# Patient Record
Sex: FEMALE | Race: WHITE | NOT HISPANIC OR LATINO | Employment: OTHER | URBAN - METROPOLITAN AREA
[De-identification: names, ages, dates, MRNs, and addresses within clinical notes are randomized per-mention and may not be internally consistent; named-entity substitution may affect disease eponyms.]

---

## 2017-01-03 ENCOUNTER — LAB (OUTPATIENT)
Dept: LAB | Facility: HOSPITAL | Age: 82
End: 2017-01-03

## 2017-01-03 ENCOUNTER — OFFICE VISIT (OUTPATIENT)
Dept: ONCOLOGY | Facility: CLINIC | Age: 82
End: 2017-01-03

## 2017-01-03 VITALS
OXYGEN SATURATION: 95 % | WEIGHT: 103.4 LBS | SYSTOLIC BLOOD PRESSURE: 122 MMHG | RESPIRATION RATE: 14 BRPM | TEMPERATURE: 97.8 F | BODY MASS INDEX: 20.3 KG/M2 | DIASTOLIC BLOOD PRESSURE: 80 MMHG | HEART RATE: 62 BPM | HEIGHT: 60 IN

## 2017-01-03 DIAGNOSIS — C18.7 MALIGNANT NEOPLASM OF SIGMOID COLON (HCC): Primary | ICD-10-CM

## 2017-01-03 DIAGNOSIS — G50.0 TRIGEMINAL NEURALGIA: ICD-10-CM

## 2017-01-03 DIAGNOSIS — C18.7 MALIGNANT NEOPLASM OF SIGMOID COLON (HCC): ICD-10-CM

## 2017-01-03 LAB
ALBUMIN SERPL-MCNC: 4.3 G/DL (ref 3.5–5.2)
ALBUMIN/GLOB SERPL: 1.9 G/DL (ref 1.1–2.4)
ALP SERPL-CCNC: 78 U/L (ref 38–116)
ALT SERPL W P-5'-P-CCNC: 21 U/L (ref 0–33)
ANION GAP SERPL CALCULATED.3IONS-SCNC: 10.6 MMOL/L
AST SERPL-CCNC: 26 U/L (ref 0–32)
BASOPHILS # BLD AUTO: 0.01 10*3/MM3 (ref 0–0.1)
BASOPHILS NFR BLD AUTO: 0.2 % (ref 0–1.1)
BILIRUB SERPL-MCNC: 0.2 MG/DL (ref 0.1–1.2)
BUN BLD-MCNC: 24 MG/DL (ref 6–20)
BUN/CREAT SERPL: 36.4 (ref 7.3–30)
CALCIUM SPEC-SCNC: 9.4 MG/DL (ref 8.5–10.2)
CEA SERPL-MCNC: 1.85 NG/ML
CHLORIDE SERPL-SCNC: 101 MMOL/L (ref 98–107)
CO2 SERPL-SCNC: 28.4 MMOL/L (ref 22–29)
CREAT BLD-MCNC: 0.66 MG/DL (ref 0.6–1.1)
DEPRECATED RDW RBC AUTO: 43.5 FL (ref 37–49)
EOSINOPHIL # BLD AUTO: 0.04 10*3/MM3 (ref 0–0.36)
EOSINOPHIL NFR BLD AUTO: 0.8 % (ref 1–5)
ERYTHROCYTE [DISTWIDTH] IN BLOOD BY AUTOMATED COUNT: 12.4 % (ref 11.7–14.5)
GFR SERPL CREATININE-BSD FRML MDRD: 85 ML/MIN/1.73
GLOBULIN UR ELPH-MCNC: 2.3 GM/DL (ref 1.8–3.5)
GLUCOSE BLD-MCNC: 83 MG/DL (ref 74–124)
HCT VFR BLD AUTO: 37.4 % (ref 34–45)
HGB BLD-MCNC: 12 G/DL (ref 11.5–14.9)
IMM GRANULOCYTES # BLD: 0.02 10*3/MM3 (ref 0–0.03)
IMM GRANULOCYTES NFR BLD: 0.4 % (ref 0–0.5)
LYMPHOCYTES # BLD AUTO: 1.04 10*3/MM3 (ref 1–3.5)
LYMPHOCYTES NFR BLD AUTO: 20.7 % (ref 20–49)
MCH RBC QN AUTO: 30.8 PG (ref 27–33)
MCHC RBC AUTO-ENTMCNC: 32.1 G/DL (ref 32–35)
MCV RBC AUTO: 96.1 FL (ref 83–97)
MONOCYTES # BLD AUTO: 0.49 10*3/MM3 (ref 0.25–0.8)
MONOCYTES NFR BLD AUTO: 9.7 % (ref 4–12)
NEUTROPHILS # BLD AUTO: 3.43 10*3/MM3 (ref 1.5–7)
NEUTROPHILS NFR BLD AUTO: 68.2 % (ref 39–75)
NRBC BLD MANUAL-RTO: 0 /100 WBC (ref 0–0)
PLATELET # BLD AUTO: 114 10*3/MM3 (ref 150–375)
PMV BLD AUTO: 8.9 FL (ref 8.9–12.1)
POTASSIUM BLD-SCNC: 4.8 MMOL/L (ref 3.5–4.7)
PROT SERPL-MCNC: 6.6 G/DL (ref 6.3–8)
RBC # BLD AUTO: 3.89 10*6/MM3 (ref 3.9–5)
SODIUM BLD-SCNC: 140 MMOL/L (ref 134–145)
WBC NRBC COR # BLD: 5.03 10*3/MM3 (ref 4–10)

## 2017-01-03 PROCEDURE — 80053 COMPREHEN METABOLIC PANEL: CPT

## 2017-01-03 PROCEDURE — 36415 COLL VENOUS BLD VENIPUNCTURE: CPT

## 2017-01-03 PROCEDURE — 85025 COMPLETE CBC W/AUTO DIFF WBC: CPT

## 2017-01-03 PROCEDURE — 82378 CARCINOEMBRYONIC ANTIGEN: CPT | Performed by: INTERNAL MEDICINE

## 2017-01-03 PROCEDURE — 99213 OFFICE O/P EST LOW 20 MIN: CPT | Performed by: INTERNAL MEDICINE

## 2017-01-03 NOTE — PROGRESS NOTES
Subjective    REASONS FOR FOLLOWUP:    1. Stage III colon cancer, initiating Xeloda 2500 mg daily for 2 weeks; 1 week off beginning on 10/09/2012.    2. Xeloda 2.5 g, 2 weeks on, 1 week off; initiated 10/08/2012.    3. Xeloda 2 g daily, 1 week on/1 week off; initiated 11/19/2012.    4. Iron deficiency, treated with Venofer.    5. 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    6. Keratitis of left eye, etiology unclear.    7. Stable right 5 mm lung nodule left lung in February 2014. CEA normal.          History of Present Illness  patient is an 83-year-old lady with a node-positive colon cancer treated with adjuvant chemotherapy 4.5  years ago she is here for routine evaluation   and is feeling well.  Her weight and appetite are stable her bowel movements are regular and she is up-to-date with mammography and colonoscopies.  She has no GI complaints at this time.    Mammogram is normal  She has no residual effects from her adjuvant treatment at this point.  Her CEA is pending today    Active Ambulatory Problems     Diagnosis Date Noted   • Trigeminal neuralgia 07/19/2016   • Malignant neoplasm of sigmoid colon 07/19/2016     Resolved Ambulatory Problems     Diagnosis Date Noted   • No Resolved Ambulatory Problems     Past Medical History   Diagnosis Date   • Anemia    • Arthritis    • Atrial fibrillation    • Colon cancer 2012   • Drug therapy 2012   • H/O Herpes simplex    • Hypertension    • Intervertebral disk disease    • Polio      Past Surgical History   Procedure Laterality Date   • Tonsillectomy     • Arthroscopy shoulder w/ open rotator cuff repair     • Joint replacement  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    right colon resection 2012 for colon cancer     oncological history  Stage III colon cancer. . Based on extrapolation from NSABP CO-7 validation studies, her risk of recurrence with  5FU/leucovorin based chemotherapy was about 22% which dropped to roughly 17% with oxaliplatin, 22% with 5FU/leucovorin and 17% with FOLFOX type chemotherapy. In addition, her risk of relapse is a little higher because there are less than 12 nodes retrieved with only three nodes in the specimen. We discussed the pros and cons and she i s going to consider Xeloda versus no treatment realizing that her risk of recurrence s probably 30-33% with no treatment.   After chemo education the patient is considering Xeloda and again we discussed the side effect profile and I told her that if she has toxicity we could stop at any time.   Xeloda 2500 mg for 2 weeks on/1 week off initiated as of 10-09-12.   Xeloda 2.5 gm 2 weeks of 3 started on 10/8/12.   The patient was seen 11/19/12 with decision to decrease the Xeloda to 2 grams daily, one week on/o ne week off, and use Feraheme for her iron deficiency anemia and ferritin of 11 because I do not want to complicate her clinical picture with the oral iron when she is already having diarrhea and constipation.   The patient was seen on 4-01-13 and completed six months of Xeloda that was discontinued. Redness and vision problems in the left eye are being evaluated by Ophthalmology.   CT scans 09/16/2013 were reviewed and the radiologist reports a 5 mm nodule in the left lower lobe which is not present on the prior exam in 07/13/2012 and repeat scans in four months was recommended. CEA is stable and normal at 2.1.   CT of the chest dated February 2014 shows a stable 5 mm nodule in the left lung, CEA is stable at 2.1.   CT scans of 09/14/2014 reviewed and showed a stable 5 mm nodule in the left lower lobe and no other changes. CEA is 2.2 Followup continued.   CAT scans dated 07/17/2015 show a stable pulmonary nodule in the left lower lobe. There is no abnormality in the abdomen CEA is 2.4.   CAT scans dated 716 shows stable pulmonary nodule no abnormalities in the abdomen at 4 year  follow-up     Review of Systems   A comprehensive 14 point review of systems was performed and was negative except as mentioned.    Medications:  The current medication list was reviewed in the EMR    ALLERGIES:    Allergies   Allergen Reactions   • Amoxicillin    • Cardizem [Diltiazem Hcl]        Objective      There were no vitals filed for this visit.  Current Status 7/19/2016   ECOG score 0       Physical Exam    GENERAL:  Well-developed, well-nourished in no acute distress.   SKIN:  Warm, dry without rashes, purpura or petechiae.  HEAD:  Normocephalic.  EYES:  Pupils equal, round and reactive to light.  EOMs intact.  Conjunctivae normal.  EARS:  Hearing intact.  NOSE:  Septum midline.  No excoriations or nasal discharge.  MOUTH:  Tongue is well-papillated; no stomatitis or ulcers.  Lips normal.  THROAT:  Oropharynx without lesions or exudates.  NECK:  Supple with good range of motion; no thyromegaly or masses, no JVD.  LYMPHATICS:  No cervical, supraclavicular, axillary or inguinal adenopathy.  CHEST:  Lungs clear to percussion and auscultation. Good airflow.  CARDIAC:  Regular rate and rhythm without murmurs, rubs or gallops. Normal S1,S2.  ABDOMEN:  Soft, nontender with no organomegaly or masses.  EXTREMITIES:  No clubbing, cyanosis or edema.  NEUROLOGICAL:  Cranial Nerves II-XII grossly intact.  No focal neurological deficits.  PSYCHIATRIC:  Normal affect and mood.          RECENT LABS:  Hematology WBC   Date Value Ref Range Status   07/19/2016 4.17 4.00 - 10.00 10*3/mm3 Final     RBC   Date Value Ref Range Status   07/19/2016 3.80 (L) 3.90 - 5.00 10*6/mm3 Final     HEMOGLOBIN   Date Value Ref Range Status   07/19/2016 12.0 11.5 - 14.9 g/dL Final     HEMATOCRIT   Date Value Ref Range Status   07/19/2016 35.7 34.0 - 45.0 % Final     PLATELETS   Date Value Ref Range Status   07/19/2016 123 (L) 150 - 375 10*3/mm3 Final          CEA in        Assessment/Plan   1.stage III colon cancer post adjuvant  Xeloda  2.stable calcified lung nodule      plan  Return in 6 months follow-up CBC and CEA+scans  Colonoscopy per Dr. Jack                1/3/2017      CC:

## 2017-01-18 RX ORDER — CARBAMAZEPINE 100 MG/1
100 TABLET, EXTENDED RELEASE ORAL 2 TIMES DAILY
Qty: 180 TABLET | Refills: 4 | OUTPATIENT
Start: 2017-01-18 | End: 2018-04-12 | Stop reason: SDUPTHER

## 2017-02-17 RX ORDER — FUROSEMIDE 20 MG/1
20 TABLET ORAL DAILY
Qty: 90 TABLET | Refills: 4 | OUTPATIENT
Start: 2017-02-17

## 2017-03-08 RX ORDER — DIGOXIN 125 MCG
0.12 TABLET ORAL DAILY
Qty: 100 TABLET | Refills: 4 | Status: CANCELLED | OUTPATIENT
Start: 2017-03-08

## 2017-03-10 RX ORDER — DOXAZOSIN MESYLATE 4 MG/1
4 TABLET ORAL NIGHTLY
Qty: 90 TABLET | Refills: 4 | OUTPATIENT
Start: 2017-03-10

## 2017-05-01 RX ORDER — ATENOLOL 100 MG/1
100 TABLET ORAL 2 TIMES DAILY
Qty: 180 TABLET | Refills: 3 | OUTPATIENT
Start: 2017-05-01 | End: 2017-10-10 | Stop reason: SDUPTHER

## 2017-06-02 ENCOUNTER — TELEPHONE (OUTPATIENT)
Dept: ONCOLOGY | Facility: CLINIC | Age: 82
End: 2017-06-02

## 2017-06-02 NOTE — TELEPHONE ENCOUNTER
Patient calling because the oral contrast for CT scan causes her to have diarrhea. She wants to know if she has to take that. Per Dr Escobar, patient can skip the oral contrast. I notified patient and placed a note on the appt line.

## 2017-06-06 ENCOUNTER — HOSPITAL ENCOUNTER (OUTPATIENT)
Dept: PET IMAGING | Facility: HOSPITAL | Age: 82
Discharge: HOME | End: 2017-06-06
Attending: INTERNAL MEDICINE | Admitting: INTERNAL MEDICINE

## 2017-06-06 DIAGNOSIS — C18.7 MALIGNANT NEOPLASM OF SIGMOID COLON (HCC): ICD-10-CM

## 2017-06-06 LAB — CREAT BLDA-MCNC: 0.8 MG/DL (ref 0.6–1.3)

## 2017-06-06 PROCEDURE — 71260 CT THORAX DX C+: CPT

## 2017-06-06 PROCEDURE — 74177 CT ABD & PELVIS W/CONTRAST: CPT

## 2017-06-06 PROCEDURE — 0 IOPAMIDOL 61 % SOLUTION: Performed by: INTERNAL MEDICINE

## 2017-06-06 PROCEDURE — 82565 ASSAY OF CREATININE: CPT

## 2017-06-06 RX ADMIN — IOPAMIDOL 85 ML: 612 INJECTION, SOLUTION INTRAVENOUS at 11:49

## 2017-06-09 ENCOUNTER — LAB (OUTPATIENT)
Dept: LAB | Facility: HOSPITAL | Age: 82
End: 2017-06-09

## 2017-06-09 ENCOUNTER — OFFICE VISIT (OUTPATIENT)
Dept: ONCOLOGY | Facility: CLINIC | Age: 82
End: 2017-06-09

## 2017-06-09 VITALS
SYSTOLIC BLOOD PRESSURE: 142 MMHG | RESPIRATION RATE: 16 BRPM | DIASTOLIC BLOOD PRESSURE: 86 MMHG | TEMPERATURE: 97.7 F | HEIGHT: 61 IN | BODY MASS INDEX: 19.18 KG/M2 | WEIGHT: 101.6 LBS | HEART RATE: 60 BPM | OXYGEN SATURATION: 98 %

## 2017-06-09 DIAGNOSIS — C18.7 MALIGNANT NEOPLASM OF SIGMOID COLON (HCC): Primary | ICD-10-CM

## 2017-06-09 DIAGNOSIS — G50.0 TRIGEMINAL NEURALGIA: ICD-10-CM

## 2017-06-09 LAB
ALBUMIN SERPL-MCNC: 4.3 G/DL (ref 3.5–5.2)
ALBUMIN/GLOB SERPL: 1.6 G/DL (ref 1.1–2.4)
ALP SERPL-CCNC: 84 U/L (ref 38–116)
ALT SERPL W P-5'-P-CCNC: 31 U/L (ref 0–33)
ANION GAP SERPL CALCULATED.3IONS-SCNC: 10.9 MMOL/L
AST SERPL-CCNC: 32 U/L (ref 0–32)
BASOPHILS # BLD AUTO: 0.01 10*3/MM3 (ref 0–0.1)
BASOPHILS NFR BLD AUTO: 0.2 % (ref 0–1.1)
BILIRUB SERPL-MCNC: <0.2 MG/DL (ref 0.1–1.2)
BUN BLD-MCNC: 21 MG/DL (ref 6–20)
BUN/CREAT SERPL: 30 (ref 7.3–30)
CALCIUM SPEC-SCNC: 9.2 MG/DL (ref 8.5–10.2)
CHLORIDE SERPL-SCNC: 101 MMOL/L (ref 98–107)
CO2 SERPL-SCNC: 28.1 MMOL/L (ref 22–29)
CREAT BLD-MCNC: 0.7 MG/DL (ref 0.6–1.1)
DEPRECATED RDW RBC AUTO: 43.3 FL (ref 37–49)
EOSINOPHIL # BLD AUTO: 0.05 10*3/MM3 (ref 0–0.36)
EOSINOPHIL NFR BLD AUTO: 1.1 % (ref 1–5)
ERYTHROCYTE [DISTWIDTH] IN BLOOD BY AUTOMATED COUNT: 12.2 % (ref 11.7–14.5)
GFR SERPL CREATININE-BSD FRML MDRD: 80 ML/MIN/1.73
GLOBULIN UR ELPH-MCNC: 2.7 GM/DL (ref 1.8–3.5)
GLUCOSE BLD-MCNC: 87 MG/DL (ref 74–124)
HCT VFR BLD AUTO: 39.1 % (ref 34–45)
HGB BLD-MCNC: 12.8 G/DL (ref 11.5–14.9)
HOLD SPECIMEN: NORMAL
IMM GRANULOCYTES # BLD: 0.01 10*3/MM3 (ref 0–0.03)
IMM GRANULOCYTES NFR BLD: 0.2 % (ref 0–0.5)
LYMPHOCYTES # BLD AUTO: 1.4 10*3/MM3 (ref 1–3.5)
LYMPHOCYTES NFR BLD AUTO: 30.6 % (ref 20–49)
MCH RBC QN AUTO: 31.4 PG (ref 27–33)
MCHC RBC AUTO-ENTMCNC: 32.7 G/DL (ref 32–35)
MCV RBC AUTO: 95.8 FL (ref 83–97)
MONOCYTES # BLD AUTO: 0.51 10*3/MM3 (ref 0.25–0.8)
MONOCYTES NFR BLD AUTO: 11.2 % (ref 4–12)
NEUTROPHILS # BLD AUTO: 2.59 10*3/MM3 (ref 1.5–7)
NEUTROPHILS NFR BLD AUTO: 56.7 % (ref 39–75)
NRBC BLD MANUAL-RTO: 0 /100 WBC (ref 0–0)
PLATELET # BLD AUTO: 138 10*3/MM3 (ref 150–375)
PMV BLD AUTO: 9.5 FL (ref 8.9–12.1)
POTASSIUM BLD-SCNC: 4.8 MMOL/L (ref 3.5–4.7)
PROT SERPL-MCNC: 7 G/DL (ref 6.3–8)
RBC # BLD AUTO: 4.08 10*6/MM3 (ref 3.9–5)
SODIUM BLD-SCNC: 140 MMOL/L (ref 134–145)
WBC NRBC COR # BLD: 4.57 10*3/MM3 (ref 4–10)

## 2017-06-09 PROCEDURE — 36415 COLL VENOUS BLD VENIPUNCTURE: CPT | Performed by: INTERNAL MEDICINE

## 2017-06-09 PROCEDURE — 82378 CARCINOEMBRYONIC ANTIGEN: CPT | Performed by: INTERNAL MEDICINE

## 2017-06-09 PROCEDURE — 85025 COMPLETE CBC W/AUTO DIFF WBC: CPT | Performed by: INTERNAL MEDICINE

## 2017-06-09 PROCEDURE — 99214 OFFICE O/P EST MOD 30 MIN: CPT | Performed by: INTERNAL MEDICINE

## 2017-06-09 PROCEDURE — 80053 COMPREHEN METABOLIC PANEL: CPT | Performed by: INTERNAL MEDICINE

## 2017-06-09 NOTE — PROGRESS NOTES
Subjective    REASONS FOR FOLLOWUP:    1. Stage III colon cancer, initiating Xeloda 2500 mg daily for 2 weeks; 1 week off beginning on 10/09/2012.    2. Xeloda 2.5 g, 2 weeks on, 1 week off; initiated 10/08/2012.    3. Xeloda 2 g daily, 1 week on/1 week off; initiated 11/19/2012.    4. Iron deficiency, treated with Venofer.    5. 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    6. Keratitis of left eye, etiology unclear.    7. Stable right 5 mm lung nodule left lung in February 2014. CEA normal.          History of Present Illness  patient is an 83-year-old lady with a node-positive colon cancer treated with adjuvant chemotherapy 5  years ago- she is here for routine evaluation   and is feeling well.  Her weight and appetite are stable her bowel movements are regular and she is up-to-date with mammography .  She has no GI complaints at this time.  Dr. Jack and discussed 1 last colonoscopy this year and she is not sure she wants to have this and I told her that she had best discuss this with Dr. Jack.  Her last colonoscopy in 2007 showed 2 polyps and then when she had a repeat colonoscopy 2012 she had a malignancy.  In addition her father had a malignancy of the colon.  If she does agree to the colonoscopy this would be her last one    .  Her CEA is pending today and thankfully her CAT scans were stable and negative    Active Ambulatory Problems     Diagnosis Date Noted   • Trigeminal neuralgia 07/19/2016   • Malignant neoplasm of sigmoid colon 07/19/2016     Resolved Ambulatory Problems     Diagnosis Date Noted   • No Resolved Ambulatory Problems     Past Medical History:   Diagnosis Date   • Anemia    • Arthritis    • Atrial fibrillation    • Colon cancer 2012   • Drug therapy 2012   • H/O Herpes simplex    • Hypertension    • Intervertebral disk disease    • Polio    • Trigeminal neuralgia      Past Surgical History:   Procedure Laterality Date   • ARTHROSCOPY SHOULDER W/ OPEN ROTATOR  CUFF REPAIR     • BREAST BIOPSY Left     at least 25 years ago.   • COLON RESECTION  08/2015   • JOINT REPLACEMENT  1947    Elbow transplant and shoulder fusion   • TONSILLECTOMY      right colon resection 2012 for colon cancer     oncological history  Stage III colon cancer. . Based on extrapolation from NSABP CO-7 validation studies, her risk of recurrence with 5FU/leucovorin based chemotherapy was about 22% which dropped to roughly 17% with oxaliplatin, 22% with 5FU/leucovorin and 17% with FOLFOX type chemotherapy. In addition, her risk of relapse is a little higher because there are less than 12 nodes retrieved with only three nodes in the specimen. We discussed the pros and cons and she i s going to consider Xeloda versus no treatment realizing that her risk of recurrence s probably 30-33% with no treatment.   After chemo education the patient is considering Xeloda and again we discussed the side effect profile and I told her that if she has toxicity we could stop at any time.   Xeloda 2500 mg for 2 weeks on/1 week off initiated as of 10-09-12.   Xeloda 2.5 gm 2 weeks of 3 started on 10/8/12.   The patient was seen 11/19/12 with decision to decrease the Xeloda to 2 grams daily, one week on/o ne week off, and use Feraheme for her iron deficiency anemia and ferritin of 11 because I do not want to complicate her clinical picture with the oral iron when she is already having diarrhea and constipation.   The patient was seen on 4-01-13 and completed six months of Xeloda that was discontinued. Redness and vision problems in the left eye are being evaluated by Ophthalmology.   CT scans 09/16/2013 were reviewed and the radiologist reports a 5 mm nodule in the left lower lobe which is not present on the prior exam in 07/13/2012 and repeat scans in four months was recommended. CEA is stable and normal at 2.1.   CT of the chest dated February 2014 shows a stable 5 mm nodule in the left lung, CEA is stable at 2.1.   CT  "scans of 09/14/2014 reviewed and showed a stable 5 mm nodule in the left lower lobe and no other changes. CEA is 2.2 Followup continued.   CAT scans dated 07/17/2015 show a stable pulmonary nodule in the left lower lobe. There is no abnormality in the abdomen CEA is 2.4.   CAT scans dated 7/16 shows stable pulmonary nodule no abnormalities in the abdomen at 4 year follow-up   CAT scans dated 6/17 showed no evidence of recurrent cancer    Review of Systems   A comprehensive 14 point review of systems was performed and was negative except as mentioned.    Medications:  The current medication list was reviewed in the EMR    ALLERGIES:    Allergies   Allergen Reactions   • Amoxicillin    • Cardizem [Diltiazem Hcl]        Objective      Vitals:    06/09/17 1427   BP: 142/86   Pulse: 60   Resp: 16   Temp: 97.7 °F (36.5 °C)   TempSrc: Oral   SpO2: 98%   Weight: 101 lb 9.6 oz (46.1 kg)   Height: 61.42\" (156 cm)  Comment: new ht   PainSc: 0-No pain     Current Status 6/9/2017   ECOG score 0       Physical Exam    GENERAL:  Well-developed, well-nourished in no acute distress.   SKIN:  Warm, dry without rashes, purpura or petechiae.  HEAD:  Normocephalic.  EYES:  Pupils equal, round and reactive to light.  EOMs intact.  Conjunctivae normal.  EARS:  Hearing intact.  NOSE:  Septum midline.  No excoriations or nasal discharge.  MOUTH:  Tongue is well-papillated; no stomatitis or ulcers.  Lips normal.  THROAT:  Oropharynx without lesions or exudates.  NECK:  Supple with good range of motion; no thyromegaly or masses, no JVD.  LYMPHATICS:  No cervical, supraclavicular, axillary or inguinal adenopathy.  CHEST:  Lungs clear to percussion and auscultation. Good airflow.  CARDIAC:  Regular rate and rhythm without murmurs, rubs or gallops. Normal S1,S2.  ABDOMEN:  Soft, nontender with no organomegaly or masses.  EXTREMITIES:  No clubbing, cyanosis or edema.  NEUROLOGICAL:  Cranial Nerves II-XII grossly intact.  No focal neurological " deficits.  PSYCHIATRIC:  Normal affect and mood.          RECENT LABS:  Hematology WBC   Date Value Ref Range Status   06/09/2017 4.57 4.00 - 10.00 10*3/mm3 Final     RBC   Date Value Ref Range Status   06/09/2017 4.08 3.90 - 5.00 10*6/mm3 Final     Hemoglobin   Date Value Ref Range Status   06/09/2017 12.8 11.5 - 14.9 g/dL Final     Hematocrit   Date Value Ref Range Status   06/09/2017 39.1 34.0 - 45.0 % Final     Platelets   Date Value Ref Range Status   06/09/2017 138 (L) 150 - 375 10*3/mm3 Final          CEA Pending    CT CAP  FINDINGS:  CHEST: There is no interval change in the 5 mm left lower lobe pulmonary  nodule (stable since previous CTs from 2013) There are no new pulmonary  opacities and there are no pleural or pericardial effusions.      ABDOMEN/PELVIS: The tiny low-attenuation focus within the right hepatic  lobe seen previously is slightly less conspicuous, but overall  unchanged, image 42. There is no new liver lesion. There is no new  abnormality involving the gallbladder, pancreas, spleen, adrenals, or  kidneys. A few renal cysts appear stable. No acute bowel abnormality is  seen. There is a tiny amount of free fluid within the dependent aspect  of the pelvis on the right which is stable and has been present on  multiple prior examinations. There is no lymphadenopathy. Extensive  abdominal aortic atherosclerotic changes are noted. Uterus and adnexa  appear unremarkable.      IMPRESSION:  Stable examination. The tiny low-attenuation focus in the  right hepatic lobe is less conspicuous, but otherwise unchanged. There  is no new abnormality.      This report was finalized on 6/7/2017       Assessment/Plan   1.stage III colon cancer post adjuvant Xeloda  2.stable calcified lung nodule      plan  Colonoscopy per Dr. Jack  No further follow-up needed in our office                6/9/2017      CC:

## 2017-06-13 LAB — CEA SERPL-MCNC: 2.73 NG/ML

## 2017-06-28 RX ORDER — DIGOXIN 125 MCG
125 TABLET ORAL DAILY
Qty: 100 TABLET | Refills: 4 | Status: CANCELLED | OUTPATIENT
Start: 2017-06-28

## 2017-08-02 RX ORDER — CLOTRIMAZOLE AND BETAMETHASONE DIPROPIONATE 10; .64 MG/G; MG/G
1 CREAM TOPICAL 2 TIMES DAILY
Qty: 15 G | Refills: 0 | OUTPATIENT
Start: 2017-08-02 | End: 2021-09-27

## 2017-09-11 RX ORDER — TRAZODONE HYDROCHLORIDE 50 MG/1
50 TABLET ORAL NIGHTLY
Qty: 90 TABLET | Refills: 4 | Status: CANCELLED | OUTPATIENT
Start: 2017-09-11

## 2017-10-03 ENCOUNTER — TRANSCRIBE ORDERS (OUTPATIENT)
Dept: ADMINISTRATIVE | Facility: HOSPITAL | Age: 82
End: 2017-10-03

## 2017-10-03 DIAGNOSIS — Z12.31 VISIT FOR SCREENING MAMMOGRAM: Primary | ICD-10-CM

## 2017-10-20 ENCOUNTER — HOSPITAL ENCOUNTER (OUTPATIENT)
Dept: MAMMOGRAPHY | Facility: HOSPITAL | Age: 82
Discharge: HOME | End: 2017-10-20
Attending: INTERNAL MEDICINE | Admitting: INTERNAL MEDICINE

## 2017-10-20 DIAGNOSIS — Z12.31 VISIT FOR SCREENING MAMMOGRAM: ICD-10-CM

## 2017-10-20 PROCEDURE — G0202 SCR MAMMO BI INCL CAD: HCPCS

## 2017-10-30 RX ORDER — IRBESARTAN 300 MG/1
300 TABLET ORAL DAILY
Qty: 90 TABLET | Refills: 4 | Status: CANCELLED | OUTPATIENT
Start: 2017-10-30

## 2018-04-12 RX ORDER — CARBAMAZEPINE 100 MG/1
100 TABLET, EXTENDED RELEASE ORAL
Qty: 180 TABLET | Refills: 4 | OUTPATIENT
Start: 2018-04-12 | End: 2018-04-12 | Stop reason: SDUPTHER

## 2018-05-16 ENCOUNTER — TRANSCRIBE ORDERS (OUTPATIENT)
Dept: ADMINISTRATIVE | Facility: HOSPITAL | Age: 83
End: 2018-05-16

## 2018-05-16 DIAGNOSIS — Z78.0 POSTMENOPAUSAL: Primary | ICD-10-CM

## 2018-05-25 ENCOUNTER — HOSPITAL ENCOUNTER (OUTPATIENT)
Dept: BONE DENSITY | Facility: HOSPITAL | Age: 83
Discharge: HOME | End: 2018-05-25
Attending: INTERNAL MEDICINE

## 2018-05-30 ENCOUNTER — HOSPITAL ENCOUNTER (OUTPATIENT)
Dept: BONE DENSITY | Facility: HOSPITAL | Age: 83
End: 2018-05-30
Attending: INTERNAL MEDICINE

## 2018-06-06 ENCOUNTER — HOSPITAL ENCOUNTER (OUTPATIENT)
Dept: BONE DENSITY | Facility: HOSPITAL | Age: 83
Discharge: HOME | End: 2018-06-06
Attending: INTERNAL MEDICINE | Admitting: INTERNAL MEDICINE

## 2018-06-06 DIAGNOSIS — Z78.0 POSTMENOPAUSAL: ICD-10-CM

## 2018-06-06 PROCEDURE — 77080 DXA BONE DENSITY AXIAL: CPT

## 2018-09-04 ENCOUNTER — TRANSCRIBE ORDERS (OUTPATIENT)
Dept: ADMINISTRATIVE | Facility: HOSPITAL | Age: 83
End: 2018-09-04

## 2018-09-04 DIAGNOSIS — Z12.31 VISIT FOR SCREENING MAMMOGRAM: Primary | ICD-10-CM

## 2018-10-29 ENCOUNTER — HOSPITAL ENCOUNTER (OUTPATIENT)
Dept: MAMMOGRAPHY | Facility: HOSPITAL | Age: 83
Discharge: HOME | End: 2018-10-29
Attending: INTERNAL MEDICINE | Admitting: INTERNAL MEDICINE

## 2018-10-29 DIAGNOSIS — Z12.31 VISIT FOR SCREENING MAMMOGRAM: ICD-10-CM

## 2018-10-29 PROCEDURE — 77067 SCR MAMMO BI INCL CAD: CPT

## 2019-06-03 ENCOUNTER — HOSPITAL ENCOUNTER (OUTPATIENT)
Dept: GENERAL RADIOLOGY | Facility: HOSPITAL | Age: 84
Discharge: HOME | End: 2019-06-03
Admitting: INTERNAL MEDICINE

## 2019-06-03 DIAGNOSIS — R09.89 ABNORMAL FINDING OF LUNG: ICD-10-CM

## 2019-06-03 PROCEDURE — 71046 X-RAY EXAM CHEST 2 VIEWS: CPT

## 2021-03-04 ENCOUNTER — IMMUNIZATION (OUTPATIENT)
Dept: VACCINE CLINIC | Facility: HOSPITAL | Age: 86
End: 2021-03-04

## 2021-03-04 PROCEDURE — 91300 HC SARSCOV02 VAC 30MCG/0.3ML IM: CPT | Performed by: INTERNAL MEDICINE

## 2021-03-04 PROCEDURE — 0001A: CPT | Performed by: INTERNAL MEDICINE

## 2021-03-25 ENCOUNTER — IMMUNIZATION (OUTPATIENT)
Dept: VACCINE CLINIC | Facility: HOSPITAL | Age: 86
End: 2021-03-25

## 2021-03-25 PROCEDURE — 91300 HC SARSCOV02 VAC 30MCG/0.3ML IM: CPT | Performed by: INTERNAL MEDICINE

## 2021-03-25 PROCEDURE — 0002A: CPT | Performed by: INTERNAL MEDICINE

## 2021-07-15 ENCOUNTER — TRANSCRIBE ORDERS (OUTPATIENT)
Dept: ADMINISTRATIVE | Facility: HOSPITAL | Age: 86
End: 2021-07-15

## 2021-07-15 ENCOUNTER — HOSPITAL ENCOUNTER (OUTPATIENT)
Dept: GENERAL RADIOLOGY | Facility: HOSPITAL | Age: 86
Discharge: HOME | End: 2021-07-15
Admitting: INTERNAL MEDICINE

## 2021-07-15 DIAGNOSIS — R06.09 DYSPNEA ON EXERTION: ICD-10-CM

## 2021-07-15 DIAGNOSIS — I48.91 ATRIAL FIBRILLATION, UNSPECIFIED TYPE (HCC): Primary | ICD-10-CM

## 2021-07-15 PROCEDURE — 71046 X-RAY EXAM CHEST 2 VIEWS: CPT

## 2021-08-31 ENCOUNTER — HOSPITAL ENCOUNTER (OUTPATIENT)
Dept: CARDIOLOGY | Facility: HOSPITAL | Age: 86
Discharge: HOME | End: 2021-08-31

## 2021-08-31 VITALS
WEIGHT: 101 LBS | HEIGHT: 61 IN | DIASTOLIC BLOOD PRESSURE: 85 MMHG | BODY MASS INDEX: 19.07 KG/M2 | SYSTOLIC BLOOD PRESSURE: 178 MMHG

## 2021-08-31 DIAGNOSIS — I48.91 ATRIAL FIBRILLATION, UNSPECIFIED TYPE (HCC): ICD-10-CM

## 2021-08-31 LAB
AORTIC DIMENSIONLESS INDEX: 0.7 (DI)
BH CV ECHO MEAS - AI DEC SLOPE: 155 CM/SEC^2
BH CV ECHO MEAS - AI MAX PG: 51.8 MMHG
BH CV ECHO MEAS - AI MAX VEL: 360 CM/SEC
BH CV ECHO MEAS - AI P1/2T: 680.3 MSEC
BH CV ECHO MEAS - AO MAX PG (FULL): 2 MMHG
BH CV ECHO MEAS - AO MAX PG: 4.8 MMHG
BH CV ECHO MEAS - AO MEAN PG (FULL): 1 MMHG
BH CV ECHO MEAS - AO MEAN PG: 2 MMHG
BH CV ECHO MEAS - AO ROOT AREA (BSA CORRECTED): 2
BH CV ECHO MEAS - AO ROOT AREA: 6.2 CM^2
BH CV ECHO MEAS - AO ROOT DIAM: 2.8 CM
BH CV ECHO MEAS - AO V2 MAX: 110 CM/SEC
BH CV ECHO MEAS - AO V2 MEAN: 73.2 CM/SEC
BH CV ECHO MEAS - AO V2 VTI: 24.9 CM
BH CV ECHO MEAS - ASC AORTA: 3.1 CM
BH CV ECHO MEAS - AVA(I,A): 2 CM^2
BH CV ECHO MEAS - AVA(I,D): 2 CM^2
BH CV ECHO MEAS - AVA(V,A): 2.2 CM^2
BH CV ECHO MEAS - AVA(V,D): 2.2 CM^2
BH CV ECHO MEAS - BSA(HAYCOCK): 1.4 M^2
BH CV ECHO MEAS - BSA: 1.4 M^2
BH CV ECHO MEAS - BZI_BMI: 19.1 KILOGRAMS/M^2
BH CV ECHO MEAS - BZI_METRIC_HEIGHT: 154.9 CM
BH CV ECHO MEAS - BZI_METRIC_WEIGHT: 45.8 KG
BH CV ECHO MEAS - EDV(CUBED): 59.3 ML
BH CV ECHO MEAS - EDV(MOD-SP2): 45 ML
BH CV ECHO MEAS - EDV(MOD-SP4): 43 ML
BH CV ECHO MEAS - EDV(TEICH): 65.9 ML
BH CV ECHO MEAS - EF(CUBED): 73.7 %
BH CV ECHO MEAS - EF(MOD-BP): 59.8 %
BH CV ECHO MEAS - EF(MOD-SP2): 60 %
BH CV ECHO MEAS - EF(MOD-SP4): 55.8 %
BH CV ECHO MEAS - EF(TEICH): 66.1 %
BH CV ECHO MEAS - ESV(CUBED): 15.6 ML
BH CV ECHO MEAS - ESV(MOD-SP2): 18 ML
BH CV ECHO MEAS - ESV(MOD-SP4): 19 ML
BH CV ECHO MEAS - ESV(TEICH): 22.3 ML
BH CV ECHO MEAS - FS: 35.9 %
BH CV ECHO MEAS - IVS/LVPW: 1
BH CV ECHO MEAS - IVSD: 1 CM
BH CV ECHO MEAS - LAT PEAK E' VEL: 11.7 CM/SEC
BH CV ECHO MEAS - LV DIASTOLIC VOL/BSA (35-75): 30.4 ML/M^2
BH CV ECHO MEAS - LV MASS(C)D: 122.1 GRAMS
BH CV ECHO MEAS - LV MASS(C)DI: 86.4 GRAMS/M^2
BH CV ECHO MEAS - LV MAX PG: 2.8 MMHG
BH CV ECHO MEAS - LV MEAN PG: 1 MMHG
BH CV ECHO MEAS - LV SYSTOLIC VOL/BSA (12-30): 13.4 ML/M^2
BH CV ECHO MEAS - LV V1 MAX: 83.6 CM/SEC
BH CV ECHO MEAS - LV V1 MEAN: 53.8 CM/SEC
BH CV ECHO MEAS - LV V1 VTI: 17.9 CM
BH CV ECHO MEAS - LVIDD: 3.9 CM
BH CV ECHO MEAS - LVIDS: 2.5 CM
BH CV ECHO MEAS - LVLD AP2: 5.2 CM
BH CV ECHO MEAS - LVLD AP4: 5.9 CM
BH CV ECHO MEAS - LVLS AP2: 4.7 CM
BH CV ECHO MEAS - LVLS AP4: 4.7 CM
BH CV ECHO MEAS - LVOT AREA (M): 2.8 CM^2
BH CV ECHO MEAS - LVOT AREA: 2.8 CM^2
BH CV ECHO MEAS - LVOT DIAM: 1.9 CM
BH CV ECHO MEAS - LVPWD: 1 CM
BH CV ECHO MEAS - MED PEAK E' VEL: 7.9 CM/SEC
BH CV ECHO MEAS - MR MAX PG: 123.7 MMHG
BH CV ECHO MEAS - MR MAX VEL: 556 CM/SEC
BH CV ECHO MEAS - MV DEC SLOPE: 513.5 CM/SEC^2
BH CV ECHO MEAS - MV DEC TIME: 165 SEC
BH CV ECHO MEAS - MV E MAX VEL: 99.8 CM/SEC
BH CV ECHO MEAS - MV MAX PG: 3.8 MMHG
BH CV ECHO MEAS - MV MEAN PG: 1 MMHG
BH CV ECHO MEAS - MV P1/2T MAX VEL: 105 CM/SEC
BH CV ECHO MEAS - MV P1/2T: 59.9 MSEC
BH CV ECHO MEAS - MV V2 MAX: 98 CM/SEC
BH CV ECHO MEAS - MV V2 MEAN: 49.4 CM/SEC
BH CV ECHO MEAS - MV V2 VTI: 24.6 CM
BH CV ECHO MEAS - MVA P1/2T LCG: 2.1 CM^2
BH CV ECHO MEAS - MVA(P1/2T): 3.7 CM^2
BH CV ECHO MEAS - MVA(VTI): 2.1 CM^2
BH CV ECHO MEAS - PA ACC TIME: 0.08 SEC
BH CV ECHO MEAS - PA MAX PG (FULL): 1.1 MMHG
BH CV ECHO MEAS - PA MAX PG: 1.8 MMHG
BH CV ECHO MEAS - PA PR(ACCEL): 43.5 MMHG
BH CV ECHO MEAS - PA V2 MAX: 66.7 CM/SEC
BH CV ECHO MEAS - RAP SYSTOLE: 8 MMHG
BH CV ECHO MEAS - RV MAX PG: 0.63 MMHG
BH CV ECHO MEAS - RV MEAN PG: 0 MMHG
BH CV ECHO MEAS - RV V1 MAX: 39.8 CM/SEC
BH CV ECHO MEAS - RV V1 MEAN: 29.8 CM/SEC
BH CV ECHO MEAS - RV V1 VTI: 9.6 CM
BH CV ECHO MEAS - RVSP: 51.6 MMHG
BH CV ECHO MEAS - SI(AO): 108.5 ML/M^2
BH CV ECHO MEAS - SI(CUBED): 30.9 ML/M^2
BH CV ECHO MEAS - SI(LVOT): 35.9 ML/M^2
BH CV ECHO MEAS - SI(MOD-SP2): 19.1 ML/M^2
BH CV ECHO MEAS - SI(MOD-SP4): 17 ML/M^2
BH CV ECHO MEAS - SI(TEICH): 30.8 ML/M^2
BH CV ECHO MEAS - SV(AO): 153.3 ML
BH CV ECHO MEAS - SV(CUBED): 43.7 ML
BH CV ECHO MEAS - SV(LVOT): 50.8 ML
BH CV ECHO MEAS - SV(MOD-SP2): 27 ML
BH CV ECHO MEAS - SV(MOD-SP4): 24 ML
BH CV ECHO MEAS - SV(TEICH): 43.6 ML
BH CV ECHO MEAS - TAPSE (>1.6): 1.6 CM
BH CV ECHO MEAS - TR MAX VEL: 330 CM/SEC
BH CV ECHO MEASUREMENTS AVERAGE E/E' RATIO: 10.18
BH CV XLRA - RV BASE: 3.6 CM
BH CV XLRA - RV LENGTH: 6.4 CM
BH CV XLRA - RV MID: 2.6 CM
BH CV XLRA - TDI S': 9.3 CM/SEC
LEFT ATRIUM VOLUME INDEX: 39.9 ML/M2
LV EF 2D ECHO EST: 60 %

## 2021-08-31 PROCEDURE — 93226 XTRNL ECG REC<48 HR SCAN A/R: CPT

## 2021-08-31 PROCEDURE — 93306 TTE W/DOPPLER COMPLETE: CPT

## 2021-08-31 PROCEDURE — 93225 XTRNL ECG REC<48 HRS REC: CPT

## 2021-08-31 PROCEDURE — 93306 TTE W/DOPPLER COMPLETE: CPT | Performed by: INTERNAL MEDICINE

## 2021-09-02 LAB
MAXIMAL PREDICTED HEART RATE: 131 BPM
STRESS TARGET HR: 111 BPM

## 2021-09-02 PROCEDURE — 93227 XTRNL ECG REC<48 HR R&I: CPT | Performed by: INTERNAL MEDICINE

## 2021-09-07 ENCOUNTER — TRANSCRIBE ORDERS (OUTPATIENT)
Dept: ADMINISTRATIVE | Facility: HOSPITAL | Age: 86
End: 2021-09-07

## 2021-09-07 DIAGNOSIS — R06.09 DYSPNEA ON EXERTION: Primary | ICD-10-CM

## 2021-09-27 ENCOUNTER — HOSPITAL ENCOUNTER (OUTPATIENT)
Dept: NUCLEAR MEDICINE | Facility: HOSPITAL | Age: 86
Discharge: HOME | End: 2021-09-27

## 2021-09-27 DIAGNOSIS — R06.09 DYSPNEA ON EXERTION: ICD-10-CM

## 2021-09-27 LAB
BH CV REST NUCLEAR ISOTOPE DOSE: 9.3 MCI
BH CV STRESS COMMENTS STAGE 1: NORMAL
BH CV STRESS DOSE REGADENOSON STAGE 1: 0.4
BH CV STRESS DURATION MIN STAGE 1: 0
BH CV STRESS DURATION SEC STAGE 1: 10
BH CV STRESS NUCLEAR ISOTOPE DOSE: 30 MCI
BH CV STRESS PROTOCOL 1: NORMAL
BH CV STRESS RECOVERY BP: NORMAL MMHG
BH CV STRESS RECOVERY HR: 82 BPM
BH CV STRESS STAGE 1: 1
LV EF NUC BP: 83 %
MAXIMAL PREDICTED HEART RATE: 131 BPM
PERCENT MAX PREDICTED HR: 83.21 %
STRESS BASELINE BP: NORMAL MMHG
STRESS BASELINE HR: 65 BPM
STRESS PERCENT HR: 98 %
STRESS POST PEAK BP: NORMAL MMHG
STRESS POST PEAK HR: 109 BPM
STRESS TARGET HR: 111 BPM

## 2021-09-27 PROCEDURE — 0 TECHNETIUM SESTAMIBI: Performed by: INTERNAL MEDICINE

## 2021-09-27 PROCEDURE — 93018 CV STRESS TEST I&R ONLY: CPT | Performed by: INTERNAL MEDICINE

## 2021-09-27 PROCEDURE — A9500 TC99M SESTAMIBI: HCPCS | Performed by: INTERNAL MEDICINE

## 2021-09-27 PROCEDURE — 78452 HT MUSCLE IMAGE SPECT MULT: CPT | Performed by: INTERNAL MEDICINE

## 2021-09-27 PROCEDURE — 78452 HT MUSCLE IMAGE SPECT MULT: CPT

## 2021-09-27 PROCEDURE — 93016 CV STRESS TEST SUPVJ ONLY: CPT | Performed by: INTERNAL MEDICINE

## 2021-09-27 PROCEDURE — 93017 CV STRESS TEST TRACING ONLY: CPT

## 2021-09-27 PROCEDURE — 25010000002 REGADENOSON 0.4 MG/5ML SOLUTION: Performed by: INTERNAL MEDICINE

## 2021-09-27 RX ORDER — FLUTICASONE PROPIONATE 50 MCG
2 SPRAY, SUSPENSION (ML) NASAL DAILY
COMMUNITY
End: 2021-10-25 | Stop reason: ALTCHOICE

## 2021-09-27 RX ORDER — LOSARTAN POTASSIUM 50 MG/1
100 TABLET ORAL DAILY
COMMUNITY
End: 2021-10-25 | Stop reason: ALTCHOICE

## 2021-09-27 RX ADMIN — TECHNETIUM TC 99M SESTAMIBI 1 DOSE: 1 INJECTION INTRAVENOUS at 08:15

## 2021-09-27 RX ADMIN — REGADENOSON 0.4 MG: 0.08 INJECTION, SOLUTION INTRAVENOUS at 12:32

## 2021-09-27 RX ADMIN — TECHNETIUM TC 99M SESTAMIBI 1 DOSE: 1 INJECTION INTRAVENOUS at 12:32

## 2021-10-25 ENCOUNTER — OFFICE VISIT (OUTPATIENT)
Dept: CARDIOLOGY | Facility: CLINIC | Age: 86
End: 2021-10-25

## 2021-10-25 VITALS
DIASTOLIC BLOOD PRESSURE: 86 MMHG | BODY MASS INDEX: 18.5 KG/M2 | SYSTOLIC BLOOD PRESSURE: 130 MMHG | WEIGHT: 98 LBS | HEART RATE: 62 BPM | HEIGHT: 61 IN

## 2021-10-25 DIAGNOSIS — I48.20 CHRONIC ATRIAL FIBRILLATION (HCC): Primary | ICD-10-CM

## 2021-10-25 DIAGNOSIS — R06.09 DOE (DYSPNEA ON EXERTION): ICD-10-CM

## 2021-10-25 PROCEDURE — 99204 OFFICE O/P NEW MOD 45 MIN: CPT | Performed by: INTERNAL MEDICINE

## 2021-10-25 PROCEDURE — 93000 ELECTROCARDIOGRAM COMPLETE: CPT | Performed by: INTERNAL MEDICINE

## 2021-10-25 RX ORDER — LOSARTAN POTASSIUM 100 MG/1
100 TABLET ORAL DAILY
COMMUNITY
Start: 2021-08-02

## 2021-10-25 NOTE — PROGRESS NOTES
Inge Borjas  8/6/1932  Date of Office Visit: 10/25/21  Encounter Provider: Mukul Bloom MD  Place of Service: [facility] CARDIOLOGY      CHIEF COMPLAINT:  COLVIN  Chronic atrial fibrillation      HISTORY OF PRESENT ILLNESS:  Inge Dominguez in consultation today.  She is a 89-year-old female with a medical history of now chronic atrial fibrillation, colon cancer, frailty, essential hypertension, who presents to me secondary to dyspnea on exertion and abnormal stress test.  She underwent evaluation for atrial fibrillation and had a transthoracic echocardiogram with no significant valvular heart disease and a preserved ejection fraction.  She wore a monitor which I have reviewed.  Ventricular rate is very well controlled and occasionally bradycardic.  Max heart rate was only 107 bpm.  She had a stress test with no perfusion defects with transient ischemic dilatation documented Along with abnormal electrocardiogram.  Prior CT scans showed no evidence of coronary artery calcification at at least in her mid 80s.  Blood pressure and heart rate are well controlled.  She does complain of dyspnea that is moderate in intensity and seems to be present when she walks upstairs.  Denies any chest pain.    Review of Systems   Constitutional: Negative for fever and malaise/fatigue.   HENT: Negative for nosebleeds and sore throat.    Eyes: Negative for blurred vision and double vision.   Cardiovascular: Negative for chest pain, claudication, palpitations and syncope.   Respiratory: Negative for cough, shortness of breath and snoring.    Endocrine: Negative for cold intolerance, heat intolerance and polydipsia.   Skin: Negative for itching, poor wound healing and rash.   Musculoskeletal: Negative for joint pain, joint swelling, muscle weakness and myalgias.   Gastrointestinal: Negative for abdominal pain, melena, nausea and vomiting.   Neurological: Negative for light-headedness, loss of  balance, seizures, vertigo and weakness.   Psychiatric/Behavioral: Negative for altered mental status and depression.       Past Medical History:   Diagnosis Date   • Anemia    • Arthritis    • Atrial fibrillation (HCC)     Intermittently   • Colon cancer (HCC) 2012    Stage III   • Drug therapy 2012    pill for 6 months   • H/O Herpes simplex     virus of the lip   • Hypertension    • Intervertebral disk disease    • Polio    • Trigeminal neuralgia        The following portions of the patient's history were reviewed and updated as appropriate: Social history , Family history and Surgical history     Current Outpatient Medications on File Prior to Visit   Medication Sig Dispense Refill   • acetaminophen (TYLENOL) 650 MG 8 hr tablet Take 650 mg by mouth every 8 (eight) hours as needed for mild pain (1-3).     • amLODIPine (NORVASC) 5 MG tablet Take 1 tablet by mouth Daily. 90 tablet 4   • aspirin 81 MG EC tablet Take 81 mg by mouth daily.     • atenolol (TENORMIN) 100 MG tablet Take 1 tablet (100 mg total) by mouth 2 (Two) Times a Day 240 tablet 1   • betamethasone dipropionate (DIPROLENE) 0.05 % ointment apply sparingly to the legs 2 times a day 15 g 2   • calcium carbonate-vitamin d 600-400 MG-UNIT per tablet Take 1 tablet by mouth daily.     • carBAMazepine XR (TEGretol  XR) 100 MG 12 hr tablet Take 1 tablet by mouth 2 (Two) Times a Day. 180 tablet 4   • digoxin (LANOXIN) 125 MCG tablet Take 1 tablet by mouth Daily. 100 tablet 3   • doxazosin (CARDURA) 4 MG tablet Take 1 tablet by mouth every night at bedtime. 90 tablet 4   • fluticasone (FLONASE) 50 MCG/ACT nasal spray 2 sprays into the nostril(s) as directed by provider Daily.     • furosemide (LASIX) 20 MG tablet Take 1 tablet by mouth daily. 90 tablet 4   • losartan (COZAAR) 50 MG tablet Take 100 mg by mouth Daily.     • Multiple Vitamins-Minerals (CENTRUM ADULTS PO) Take  by mouth.     • Scar Treatment Products (CELACYN) gel Apply sparingly to scar area twice  "daily. (Right cheek) (Patient taking differently: Apply  topically 1 (One) Time Per Week.) 28 g 60   • traZODone (DESYREL) 50 MG tablet Take 1 tablet by mouth every night at bedtime. 90 tablet 4     No current facility-administered medications on file prior to visit.       Allergies   Allergen Reactions   • Cardizem [Diltiazem Hcl] Unknown - Low Severity   • Amoxicillin Rash       Vitals:    10/25/21 1220   BP: 130/86   Pulse: 62   Weight: 44.5 kg (98 lb)   Height: 154.9 cm (61\")     Body mass index is 18.52 kg/m².   Constitutional:       Appearance: Well-developed.   Eyes:      General: No scleral icterus.     Conjunctiva/sclera: Conjunctivae normal.   HENT:      Head: Normocephalic and atraumatic.   Neck:      Thyroid: No thyromegaly.      Vascular: Normal carotid pulses. No carotid bruit, hepatojugular reflux or JVD.      Trachea: No tracheal deviation.   Pulmonary:      Effort: No respiratory distress.      Breath sounds: Normal breath sounds. No decreased breath sounds. No wheezing. No rhonchi. No rales.   Chest:      Chest wall: Not tender to palpatation.   Cardiovascular:      Normal rate. Regularly irregular rhythm.      No gallop.   Pulses:     Carotid: 2+ bilaterally.     Radial: 2+ bilaterally.     Femoral: 2+ bilaterally.     Dorsalis pedis: 2+ bilaterally.     Posterior tibial: 2+ bilaterally.  Edema:     Peripheral edema absent.   Abdominal:      General: Bowel sounds are normal. There is no distension.      Palpations: Abdomen is soft.      Tenderness: There is no abdominal tenderness.   Musculoskeletal:         General: No deformity.      Cervical back: Normal range of motion and neck supple. Skin:     Findings: No erythema or rash.   Neurological:      Mental Status: Alert and oriented to person, place, and time.      Sensory: No sensory deficit.   Psychiatric:         Behavior: Behavior normal.            Lab Results   Component Value Date    WBC 4.57 06/09/2017    HGB 12.8 06/09/2017    HCT 39.1 " 06/09/2017    MCV 95.8 06/09/2017     (L) 06/09/2017       Lab Results   Component Value Date    GLUCOSE 87 06/09/2017    BUN 21 (H) 06/09/2017    CREATININE 0.70 06/09/2017    EGFRIFNONA 80 06/09/2017    BCR 30.0 06/09/2017    K 4.8 (H) 06/09/2017    CO2 28.1 06/09/2017    CALCIUM 9.2 06/09/2017    ALBUMIN 4.30 06/09/2017    AST 32 06/09/2017    ALT 31 06/09/2017       Lab Results   Component Value Date    GLUCOSE 87 06/09/2017    CALCIUM 9.2 06/09/2017     06/09/2017    K 4.8 (H) 06/09/2017    CO2 28.1 06/09/2017     06/09/2017    BUN 21 (H) 06/09/2017    CREATININE 0.70 06/09/2017    EGFRIFNONA 80 06/09/2017    BCR 30.0 06/09/2017    ANIONGAP 10.9 06/09/2017       No results found for: CHOL, CHLPL, TRIG, HDL, LDL, LDLDIRECT      ECG 12 Lead    Date/Time: 10/25/2021 12:45 PM  Performed by: Mukul Bloom MD  Authorized by: Mukul Bloom MD   Comparison: not compared with previous ECG   Previous ECG: no previous ECG available  Rhythm: atrial fibrillation  Rate: normal  Other findings: non-specific ST-T wave changes    Clinical impression: non-specific ECG                8/31/2021  · Left atrial volume is moderately increased.  · The right atrial cavity is moderately dilated.  · Mild to moderate mitral valve regurgitation is present.  · Estimated left ventricular EF = 60% Left ventricular systolic function is normal.  · Left ventricular diastolic function was indeterminate.         9/27/2021  · ECG evidence of myocardial ischemia. Diffuse 1.5 mm downsloping ST depression is noted at peak stress with 1 mm ST elevation in aVR.  · Myocardial perfusion imaging with no perfusion defect however transient ischemic dilatation score is elevated at 1.42. Considering significant EKG changes cannot exclude ischemia on this stress study.    8/31/21  · An abnormal monitor study.  · Atrial fibrillation is present throughout  · Average heart rate 62 bpm, range  bpm  · Maximal R-R interval  2.9 seconds at 8:13 AM          Results for orders placed during the hospital encounter of 08/31/21    Adult Transthoracic Echo Complete W/ Cont if Necessary Per Protocol    Interpretation Summary  · Left atrial volume is moderately increased.  · The right atrial cavity is moderately dilated.  · Mild to moderate mitral valve regurgitation is present.  · Estimated left ventricular EF = 60% Left ventricular systolic function is normal.  · Left ventricular diastolic function was indeterminate.      DISCUSSION/SUMMARY  Very pleasant 89-year-old female with a medical history of chronic atrial fibrillation, essential hypertension, frailty, colon cancer, mild to moderate mitral valve regurgitation who presents to me secondary to her atrial fibrillation and COLVIN.  She had a stress test which showed some ST depression and transient ischemic dilatation but had normal perfusion.  She has previously had chest imaging with no coronary calcification and has no angina.    1.  COLVIN  -I do not think she needs a coronary angiography.  She is not previously had any significant CAD and her perfusion is normal.  She did have 3 times daily documented on her stress test but in the absence of prior coronary calcification or angina and the presence of frailty I would not recommend invasive evaluation.  -Chest x-ray has been reviewed.  No evidence of mass or pneumonia.    2.  Chronic atrial fibrillation  -Finally been her off of anticoagulant therapy secondary to her frailty and advanced age.  -Continue atenolol at current dose.  I recommend that she decrease her digoxin every other day and plan on moving off of that in the future if her rate maintains good control.

## 2021-10-29 ENCOUNTER — IMMUNIZATION (OUTPATIENT)
Dept: VACCINE CLINIC | Facility: HOSPITAL | Age: 86
End: 2021-10-29

## 2021-10-29 PROCEDURE — 91300 HC SARSCOV02 VAC 30MCG/0.3ML IM: CPT | Performed by: INTERNAL MEDICINE

## 2021-10-29 PROCEDURE — 0004A ADM SARSCOV2 30MCG/0.3ML BOOSTER: CPT | Performed by: INTERNAL MEDICINE

## 2022-06-17 ENCOUNTER — IMMUNIZATION (OUTPATIENT)
Dept: VACCINE CLINIC | Facility: HOSPITAL | Age: 87
End: 2022-06-17

## 2022-06-17 DIAGNOSIS — Z23 NEED FOR VACCINATION: Primary | ICD-10-CM

## 2022-06-17 PROCEDURE — 0054A HC ADM SARSCV2 30MCG TRS-SUCR BOOSTER: CPT | Performed by: INTERNAL MEDICINE

## 2022-06-17 PROCEDURE — 91305 HC SARSCOV2 VAC 30 MCG TRS-SUCR PFIZER: CPT | Performed by: INTERNAL MEDICINE

## 2022-07-30 ENCOUNTER — APPOINTMENT (OUTPATIENT)
Dept: GENERAL RADIOLOGY | Facility: HOSPITAL | Age: 87
End: 2022-07-30

## 2022-07-30 PROCEDURE — 73070 X-RAY EXAM OF ELBOW: CPT | Performed by: FAMILY MEDICINE

## 2022-07-30 PROCEDURE — 73090 X-RAY EXAM OF FOREARM: CPT | Performed by: FAMILY MEDICINE

## 2022-07-31 ENCOUNTER — TELEPHONE (OUTPATIENT)
Dept: FAMILY MEDICINE CLINIC | Facility: CLINIC | Age: 87
End: 2022-07-31

## 2022-07-31 NOTE — TELEPHONE ENCOUNTER
----- Message from Yessi Kolb MD sent at 7/30/2022  2:29 PM EDT -----  XR did not show fracture

## 2022-08-01 ENCOUNTER — HOSPITAL ENCOUNTER (OUTPATIENT)
Dept: CARDIOLOGY | Facility: HOSPITAL | Age: 87
Discharge: HOME | End: 2022-08-01
Admitting: INTERNAL MEDICINE

## 2022-08-01 ENCOUNTER — TRANSCRIBE ORDERS (OUTPATIENT)
Dept: ADMINISTRATIVE | Facility: HOSPITAL | Age: 87
End: 2022-08-01

## 2022-08-01 DIAGNOSIS — M79.601 PAIN AND SWELLING OF RIGHT UPPER EXTREMITY: ICD-10-CM

## 2022-08-01 DIAGNOSIS — M79.89 PAIN AND SWELLING OF RIGHT UPPER EXTREMITY: ICD-10-CM

## 2022-08-01 DIAGNOSIS — M79.89 PAIN AND SWELLING OF RIGHT UPPER EXTREMITY: Primary | ICD-10-CM

## 2022-08-01 DIAGNOSIS — M79.601 PAIN AND SWELLING OF RIGHT UPPER EXTREMITY: Primary | ICD-10-CM

## 2022-08-01 LAB
BH CV UPPER VENOUS LEFT INTERNAL JUGULAR AUGMENT: NORMAL
BH CV UPPER VENOUS LEFT INTERNAL JUGULAR COMPRESS: NORMAL
BH CV UPPER VENOUS LEFT INTERNAL JUGULAR PHASIC: NORMAL
BH CV UPPER VENOUS LEFT INTERNAL JUGULAR SPONT: NORMAL
BH CV UPPER VENOUS LEFT SUBCLAVIAN AUGMENT: NORMAL
BH CV UPPER VENOUS LEFT SUBCLAVIAN COMPRESS: NORMAL
BH CV UPPER VENOUS LEFT SUBCLAVIAN PHASIC: NORMAL
BH CV UPPER VENOUS LEFT SUBCLAVIAN SPONT: NORMAL
BH CV UPPER VENOUS RIGHT AXILLARY AUGMENT: NORMAL
BH CV UPPER VENOUS RIGHT AXILLARY COMPRESS: NORMAL
BH CV UPPER VENOUS RIGHT AXILLARY PHASIC: NORMAL
BH CV UPPER VENOUS RIGHT AXILLARY SPONT: NORMAL
BH CV UPPER VENOUS RIGHT BASILIC FOREARM COMPRESS: NORMAL
BH CV UPPER VENOUS RIGHT BASILIC UPPER COMPRESS: NORMAL
BH CV UPPER VENOUS RIGHT BRACHIAL COMPRESS: NORMAL
BH CV UPPER VENOUS RIGHT CEPHALIC FOREARM COMPRESS: NORMAL
BH CV UPPER VENOUS RIGHT CEPHALIC UPPER COMPRESS: NORMAL
BH CV UPPER VENOUS RIGHT INTERNAL JUGULAR AUGMENT: NORMAL
BH CV UPPER VENOUS RIGHT INTERNAL JUGULAR COMPRESS: NORMAL
BH CV UPPER VENOUS RIGHT INTERNAL JUGULAR PHASIC: NORMAL
BH CV UPPER VENOUS RIGHT INTERNAL JUGULAR SPONT: NORMAL
BH CV UPPER VENOUS RIGHT RADIAL COMPRESS: NORMAL
BH CV UPPER VENOUS RIGHT SUBCLAVIAN AUGMENT: NORMAL
BH CV UPPER VENOUS RIGHT SUBCLAVIAN COMPRESS: NORMAL
BH CV UPPER VENOUS RIGHT SUBCLAVIAN PHASIC: NORMAL
BH CV UPPER VENOUS RIGHT SUBCLAVIAN SPONT: NORMAL
BH CV UPPER VENOUS RIGHT ULNAR COMPRESS: NORMAL
MAXIMAL PREDICTED HEART RATE: 131 BPM
STRESS TARGET HR: 111 BPM

## 2022-08-01 PROCEDURE — 93971 EXTREMITY STUDY: CPT

## 2023-06-06 ENCOUNTER — HOSPITAL ENCOUNTER (OUTPATIENT)
Dept: GENERAL RADIOLOGY | Facility: HOSPITAL | Age: 88
Discharge: HOME | End: 2023-06-06
Payer: MEDICARE

## 2023-06-06 DIAGNOSIS — M25.511 RIGHT SHOULDER PAIN, UNSPECIFIED CHRONICITY: ICD-10-CM

## 2023-06-06 PROCEDURE — 73030 X-RAY EXAM OF SHOULDER: CPT

## 2023-06-06 PROCEDURE — 73070 X-RAY EXAM OF ELBOW: CPT

## 2023-06-06 PROCEDURE — 73060 X-RAY EXAM OF HUMERUS: CPT

## 2023-08-14 ENCOUNTER — OFFICE VISIT (OUTPATIENT)
Dept: ORTHOPEDIC SURGERY | Facility: CLINIC | Age: 88
End: 2023-08-14
Payer: MEDICARE

## 2023-08-14 VITALS — TEMPERATURE: 98 F | WEIGHT: 100 LBS | BODY MASS INDEX: 19.63 KG/M2 | HEIGHT: 60 IN

## 2023-08-14 DIAGNOSIS — M79.601 RIGHT ARM PAIN: ICD-10-CM

## 2023-08-14 DIAGNOSIS — M25.511 RIGHT SHOULDER PAIN, UNSPECIFIED CHRONICITY: Primary | ICD-10-CM

## 2023-08-14 NOTE — PROGRESS NOTES
CC:  Follow up right proximal humerus fracture    Ms. Borjas follows up for her right shoulder.  She says it is much better.  She says that she is back to baseline from a functional standpoint at this point.  Denies any significant pain.    No tenderness around the right shoulder.  Motion is limited but that is her baseline.  I do not appreciate any gross motion at the fracture site.    AP and orthogonal views right shoulder are ordered and reviewed today to evaluate her complaint.  These are compared to previous x-rays.  I can see an area of lucency in the proximal humerus at the fracture site.  I think this probably represents some resorption right along the fracture.  I do not see any concerning findings.    Assessment: Healing right proximal humerus fracture in patient with history of previous shoulder fusion    Plan: She seems to be doing well.  She reports feeling like she is back to baseline.  I am going to release her to advance activity as tolerated and follow-up with me as needed.    Bryan Gutierrez MD

## 2023-09-25 ENCOUNTER — TRANSCRIBE ORDERS (OUTPATIENT)
Dept: LAB | Facility: HOSPITAL | Age: 88
End: 2023-09-25

## 2023-09-25 ENCOUNTER — LAB (OUTPATIENT)
Dept: LAB | Facility: HOSPITAL | Age: 88
End: 2023-09-25
Payer: MEDICARE

## 2023-09-25 DIAGNOSIS — R53.83 TIREDNESS: ICD-10-CM

## 2023-09-25 DIAGNOSIS — R79.89 ELEVATED TSH: ICD-10-CM

## 2023-09-25 DIAGNOSIS — R79.89 ELEVATED TSH: Primary | ICD-10-CM

## 2023-09-25 LAB
T4 FREE SERPL-MCNC: 0.91 NG/DL (ref 0.93–1.7)
TSH SERPL DL<=0.05 MIU/L-ACNC: 7.12 UIU/ML (ref 0.27–4.2)

## 2023-09-25 PROCEDURE — 84439 ASSAY OF FREE THYROXINE: CPT

## 2023-09-25 PROCEDURE — 36415 COLL VENOUS BLD VENIPUNCTURE: CPT

## 2023-09-25 PROCEDURE — 84443 ASSAY THYROID STIM HORMONE: CPT

## 2024-01-02 ENCOUNTER — HOSPITAL ENCOUNTER (OUTPATIENT)
Dept: GENERAL RADIOLOGY | Facility: HOSPITAL | Age: 89
Discharge: HOME | End: 2024-01-02
Payer: MEDICARE

## 2024-01-02 ENCOUNTER — TRANSCRIBE ORDERS (OUTPATIENT)
Dept: LAB | Facility: HOSPITAL | Age: 89
End: 2024-01-02
Payer: MEDICARE

## 2024-01-02 ENCOUNTER — LAB (OUTPATIENT)
Dept: LAB | Facility: HOSPITAL | Age: 89
End: 2024-01-02
Payer: MEDICARE

## 2024-01-02 DIAGNOSIS — E03.9 HYPOTHYROIDISM, UNSPECIFIED TYPE: ICD-10-CM

## 2024-01-02 DIAGNOSIS — E06.0 ACUTE THYROIDITIS: ICD-10-CM

## 2024-01-02 DIAGNOSIS — R06.9 ABNORMAL RESPIRATORY RATE: Primary | ICD-10-CM

## 2024-01-02 DIAGNOSIS — R06.9 ABNORMAL RESPIRATORY RATE: ICD-10-CM

## 2024-01-02 DIAGNOSIS — R06.09 DYSPNEA ON EXERTION: ICD-10-CM

## 2024-01-02 LAB
ALBUMIN SERPL-MCNC: 4.3 G/DL (ref 3.5–5.2)
ALBUMIN/GLOB SERPL: 1.6 G/DL
ALP SERPL-CCNC: 104 U/L (ref 39–117)
ALT SERPL W P-5'-P-CCNC: 28 U/L (ref 1–33)
ANION GAP SERPL CALCULATED.3IONS-SCNC: 11.6 MMOL/L (ref 5–15)
AST SERPL-CCNC: 26 U/L (ref 1–32)
BASOPHILS # BLD AUTO: 0.01 10*3/MM3 (ref 0–0.2)
BASOPHILS NFR BLD AUTO: 0.2 % (ref 0–1.5)
BILIRUB SERPL-MCNC: 0.5 MG/DL (ref 0–1.2)
BUN SERPL-MCNC: 26 MG/DL (ref 8–23)
BUN/CREAT SERPL: 30.6 (ref 7–25)
CALCIUM SPEC-SCNC: 9.9 MG/DL (ref 8.2–9.6)
CHLORIDE SERPL-SCNC: 101 MMOL/L (ref 98–107)
CO2 SERPL-SCNC: 25.4 MMOL/L (ref 22–29)
CREAT SERPL-MCNC: 0.85 MG/DL (ref 0.57–1)
DEPRECATED RDW RBC AUTO: 46.1 FL (ref 37–54)
EGFRCR SERPLBLD CKD-EPI 2021: 64.8 ML/MIN/1.73
EOSINOPHIL # BLD AUTO: 0.07 10*3/MM3 (ref 0–0.4)
EOSINOPHIL NFR BLD AUTO: 1.3 % (ref 0.3–6.2)
ERYTHROCYTE [DISTWIDTH] IN BLOOD BY AUTOMATED COUNT: 13.2 % (ref 12.3–15.4)
GLOBULIN UR ELPH-MCNC: 2.7 GM/DL
GLUCOSE SERPL-MCNC: 99 MG/DL (ref 65–99)
HCT VFR BLD AUTO: 35.7 % (ref 34–46.6)
HGB BLD-MCNC: 12.2 G/DL (ref 12–15.9)
LYMPHOCYTES # BLD AUTO: 1 10*3/MM3 (ref 0.7–3.1)
LYMPHOCYTES NFR BLD AUTO: 18.8 % (ref 19.6–45.3)
MCH RBC QN AUTO: 32.7 PG (ref 26.6–33)
MCHC RBC AUTO-ENTMCNC: 34.2 G/DL (ref 31.5–35.7)
MCV RBC AUTO: 95.7 FL (ref 79–97)
MONOCYTES # BLD AUTO: 0.51 10*3/MM3 (ref 0.1–0.9)
MONOCYTES NFR BLD AUTO: 9.6 % (ref 5–12)
NEUTROPHILS NFR BLD AUTO: 3.7 10*3/MM3 (ref 1.7–7)
NEUTROPHILS NFR BLD AUTO: 69.7 % (ref 42.7–76)
NT-PROBNP SERPL-MCNC: 2057 PG/ML (ref 0–1800)
PLATELET # BLD AUTO: 160 10*3/MM3 (ref 140–450)
PMV BLD AUTO: 9.3 FL (ref 6–12)
POTASSIUM SERPL-SCNC: 4.1 MMOL/L (ref 3.5–5.2)
PROT SERPL-MCNC: 7 G/DL (ref 6–8.5)
RBC # BLD AUTO: 3.73 10*6/MM3 (ref 3.77–5.28)
SODIUM SERPL-SCNC: 138 MMOL/L (ref 136–145)
T4 FREE SERPL-MCNC: 1.04 NG/DL (ref 0.93–1.7)
TSH SERPL DL<=0.05 MIU/L-ACNC: 8.71 UIU/ML (ref 0.27–4.2)
WBC NRBC COR # BLD AUTO: 5.31 10*3/MM3 (ref 3.4–10.8)

## 2024-01-02 PROCEDURE — 85025 COMPLETE CBC W/AUTO DIFF WBC: CPT

## 2024-01-02 PROCEDURE — 36415 COLL VENOUS BLD VENIPUNCTURE: CPT

## 2024-01-02 PROCEDURE — 84439 ASSAY OF FREE THYROXINE: CPT

## 2024-01-02 PROCEDURE — 71046 X-RAY EXAM CHEST 2 VIEWS: CPT

## 2024-01-02 PROCEDURE — 83880 ASSAY OF NATRIURETIC PEPTIDE: CPT

## 2024-01-02 PROCEDURE — 84443 ASSAY THYROID STIM HORMONE: CPT

## 2024-01-02 PROCEDURE — 80053 COMPREHEN METABOLIC PANEL: CPT

## 2024-07-15 ENCOUNTER — TRANSCRIBE ORDERS (OUTPATIENT)
Dept: ADMINISTRATIVE | Facility: HOSPITAL | Age: 89
End: 2024-07-15
Payer: MEDICARE

## 2024-07-15 ENCOUNTER — LAB (OUTPATIENT)
Dept: LAB | Facility: HOSPITAL | Age: 89
End: 2024-07-15
Payer: MEDICARE

## 2024-07-15 DIAGNOSIS — I10 ESSENTIAL HYPERTENSION, MALIGNANT: Primary | ICD-10-CM

## 2024-07-15 DIAGNOSIS — I10 ESSENTIAL HYPERTENSION, MALIGNANT: ICD-10-CM

## 2024-07-15 LAB
ANION GAP SERPL CALCULATED.3IONS-SCNC: 11 MMOL/L (ref 5–15)
BUN SERPL-MCNC: 35 MG/DL (ref 8–23)
BUN/CREAT SERPL: 28 (ref 7–25)
CALCIUM SPEC-SCNC: 9.8 MG/DL (ref 8.2–9.6)
CHLORIDE SERPL-SCNC: 97 MMOL/L (ref 98–107)
CO2 SERPL-SCNC: 28 MMOL/L (ref 22–29)
CREAT SERPL-MCNC: 1.25 MG/DL (ref 0.57–1)
EGFRCR SERPLBLD CKD-EPI 2021: 40.8 ML/MIN/1.73
GLUCOSE SERPL-MCNC: 103 MG/DL (ref 65–99)
POTASSIUM SERPL-SCNC: 4.8 MMOL/L (ref 3.5–5.2)
SODIUM SERPL-SCNC: 136 MMOL/L (ref 136–145)

## 2024-07-15 PROCEDURE — 80048 BASIC METABOLIC PNL TOTAL CA: CPT

## 2024-07-15 PROCEDURE — 36415 COLL VENOUS BLD VENIPUNCTURE: CPT

## 2024-09-24 ENCOUNTER — TRANSCRIBE ORDERS (OUTPATIENT)
Dept: ADMINISTRATIVE | Facility: HOSPITAL | Age: 89
End: 2024-09-24
Payer: MEDICARE

## 2024-09-24 ENCOUNTER — LAB (OUTPATIENT)
Dept: LAB | Facility: HOSPITAL | Age: 89
End: 2024-09-24
Payer: MEDICARE

## 2024-09-24 DIAGNOSIS — Z85.038 HISTORY OF COLON CANCER, STAGE III: ICD-10-CM

## 2024-09-24 DIAGNOSIS — I10 ESSENTIAL HYPERTENSION, MALIGNANT: ICD-10-CM

## 2024-09-24 DIAGNOSIS — N18.30 STAGE 3 CHRONIC KIDNEY DISEASE, UNSPECIFIED WHETHER STAGE 3A OR 3B CKD: ICD-10-CM

## 2024-09-24 DIAGNOSIS — R63.6 UNDERWEIGHT: Primary | ICD-10-CM

## 2024-09-24 DIAGNOSIS — R63.6 UNDERWEIGHT: ICD-10-CM

## 2024-09-24 DIAGNOSIS — I48.91 ATRIAL FIBRILLATION, UNSPECIFIED TYPE: ICD-10-CM

## 2024-09-24 LAB
25(OH)D3 SERPL-MCNC: 57.2 NG/ML (ref 30–100)
ALBUMIN SERPL-MCNC: 4.6 G/DL (ref 3.5–5.2)
ALBUMIN/GLOB SERPL: 1.9 G/DL
ALP SERPL-CCNC: 98 U/L (ref 39–117)
ALT SERPL W P-5'-P-CCNC: 22 U/L (ref 1–33)
ANION GAP SERPL CALCULATED.3IONS-SCNC: 10 MMOL/L (ref 5–15)
AST SERPL-CCNC: 22 U/L (ref 1–32)
BACTERIA UR QL AUTO: ABNORMAL /HPF
BASOPHILS # BLD AUTO: 0.01 10*3/MM3 (ref 0–0.2)
BASOPHILS NFR BLD AUTO: 0.2 % (ref 0–1.5)
BILIRUB SERPL-MCNC: 0.5 MG/DL (ref 0–1.2)
BILIRUB UR QL STRIP: NEGATIVE
BUN SERPL-MCNC: 28 MG/DL (ref 8–23)
BUN/CREAT SERPL: 28.9 (ref 7–25)
CALCIUM SPEC-SCNC: 9.9 MG/DL (ref 8.2–9.6)
CHLORIDE SERPL-SCNC: 100 MMOL/L (ref 98–107)
CLARITY UR: CLEAR
CO2 SERPL-SCNC: 26 MMOL/L (ref 22–29)
COLOR UR: YELLOW
CREAT SERPL-MCNC: 0.97 MG/DL (ref 0.57–1)
DEPRECATED RDW RBC AUTO: 42.6 FL (ref 37–54)
EGFRCR SERPLBLD CKD-EPI 2021: 54.9 ML/MIN/1.73
EOSINOPHIL # BLD AUTO: 0.04 10*3/MM3 (ref 0–0.4)
EOSINOPHIL NFR BLD AUTO: 0.8 % (ref 0.3–6.2)
ERYTHROCYTE [DISTWIDTH] IN BLOOD BY AUTOMATED COUNT: 12 % (ref 12.3–15.4)
FOLATE SERPL-MCNC: >20 NG/ML (ref 4.78–24.2)
GLOBULIN UR ELPH-MCNC: 2.4 GM/DL
GLUCOSE SERPL-MCNC: 89 MG/DL (ref 65–99)
GLUCOSE UR STRIP-MCNC: NEGATIVE MG/DL
HCT VFR BLD AUTO: 39.2 % (ref 34–46.6)
HGB BLD-MCNC: 13.3 G/DL (ref 12–15.9)
HGB UR QL STRIP.AUTO: NEGATIVE
HYALINE CASTS UR QL AUTO: ABNORMAL /LPF
IMM GRANULOCYTES # BLD AUTO: 0.02 10*3/MM3 (ref 0–0.05)
IMM GRANULOCYTES NFR BLD AUTO: 0.4 % (ref 0–0.5)
KETONES UR QL STRIP: NEGATIVE
LEUKOCYTE ESTERASE UR QL STRIP.AUTO: ABNORMAL
LYMPHOCYTES # BLD AUTO: 0.96 10*3/MM3 (ref 0.7–3.1)
LYMPHOCYTES NFR BLD AUTO: 20.2 % (ref 19.6–45.3)
MCH RBC QN AUTO: 33 PG (ref 26.6–33)
MCHC RBC AUTO-ENTMCNC: 33.9 G/DL (ref 31.5–35.7)
MCV RBC AUTO: 97.3 FL (ref 79–97)
MONOCYTES # BLD AUTO: 0.36 10*3/MM3 (ref 0.1–0.9)
MONOCYTES NFR BLD AUTO: 7.6 % (ref 5–12)
NEUTROPHILS NFR BLD AUTO: 3.36 10*3/MM3 (ref 1.7–7)
NEUTROPHILS NFR BLD AUTO: 70.8 % (ref 42.7–76)
NITRITE UR QL STRIP: NEGATIVE
PH UR STRIP.AUTO: 7.5 [PH] (ref 5–8)
PLATELET # BLD AUTO: 135 10*3/MM3 (ref 140–450)
PMV BLD AUTO: 9.5 FL (ref 6–12)
POTASSIUM SERPL-SCNC: 4.8 MMOL/L (ref 3.5–5.2)
PROT SERPL-MCNC: 7 G/DL (ref 6–8.5)
PROT UR QL STRIP: ABNORMAL
RBC # BLD AUTO: 4.03 10*6/MM3 (ref 3.77–5.28)
RBC # UR STRIP: ABNORMAL /HPF
REF LAB TEST METHOD: ABNORMAL
SODIUM SERPL-SCNC: 136 MMOL/L (ref 136–145)
SP GR UR STRIP: 1.02 (ref 1–1.03)
SQUAMOUS #/AREA URNS HPF: ABNORMAL /HPF
T4 FREE SERPL-MCNC: 0.92 NG/DL (ref 0.92–1.68)
TSH SERPL DL<=0.05 MIU/L-ACNC: 15.5 UIU/ML (ref 0.27–4.2)
UROBILINOGEN UR QL STRIP: ABNORMAL
VIT B12 BLD-MCNC: 1173 PG/ML (ref 211–946)
WBC # UR STRIP: ABNORMAL /HPF
WBC NRBC COR # BLD AUTO: 4.75 10*3/MM3 (ref 3.4–10.8)

## 2024-09-24 PROCEDURE — 87088 URINE BACTERIA CULTURE: CPT

## 2024-09-24 PROCEDURE — 81001 URINALYSIS AUTO W/SCOPE: CPT

## 2024-09-24 PROCEDURE — 36415 COLL VENOUS BLD VENIPUNCTURE: CPT

## 2024-09-24 PROCEDURE — 84439 ASSAY OF FREE THYROXINE: CPT

## 2024-09-24 PROCEDURE — 84443 ASSAY THYROID STIM HORMONE: CPT

## 2024-09-24 PROCEDURE — 87186 SC STD MICRODIL/AGAR DIL: CPT

## 2024-09-24 PROCEDURE — 87086 URINE CULTURE/COLONY COUNT: CPT

## 2024-09-24 PROCEDURE — 82607 VITAMIN B-12: CPT

## 2024-09-24 PROCEDURE — 80053 COMPREHEN METABOLIC PANEL: CPT

## 2024-09-24 PROCEDURE — 82746 ASSAY OF FOLIC ACID SERUM: CPT

## 2024-09-24 PROCEDURE — 85025 COMPLETE CBC W/AUTO DIFF WBC: CPT

## 2024-09-24 PROCEDURE — 82306 VITAMIN D 25 HYDROXY: CPT

## 2024-09-25 LAB — BACTERIA SPEC AEROBE CULT: ABNORMAL

## 2024-10-01 ENCOUNTER — HOSPITAL ENCOUNTER (OUTPATIENT)
Dept: GENERAL RADIOLOGY | Facility: HOSPITAL | Age: 89
Discharge: HOME | End: 2024-10-01
Payer: MEDICARE

## 2024-10-01 ENCOUNTER — TRANSCRIBE ORDERS (OUTPATIENT)
Dept: ADMINISTRATIVE | Facility: HOSPITAL | Age: 89
End: 2024-10-01
Payer: MEDICARE

## 2024-10-01 ENCOUNTER — LAB (OUTPATIENT)
Dept: LAB | Facility: HOSPITAL | Age: 89
End: 2024-10-01
Payer: MEDICARE

## 2024-10-01 DIAGNOSIS — R79.89 HYPOURICEMIA: ICD-10-CM

## 2024-10-01 DIAGNOSIS — E83.52 HYPERCALCEMIA: ICD-10-CM

## 2024-10-01 DIAGNOSIS — R79.89 HYPOURICEMIA: Primary | ICD-10-CM

## 2024-10-01 DIAGNOSIS — R06.09 DYSPNEA ON EXERTION: ICD-10-CM

## 2024-10-01 LAB
CALCIUM SPEC-SCNC: 10.2 MG/DL (ref 8.2–9.6)
PTH-INTACT SERPL-MCNC: 69.7 PG/ML (ref 15–65)
T4 FREE SERPL-MCNC: 0.88 NG/DL (ref 0.92–1.68)
TSH SERPL DL<=0.05 MIU/L-ACNC: 13.6 UIU/ML (ref 0.27–4.2)

## 2024-10-01 PROCEDURE — 84439 ASSAY OF FREE THYROXINE: CPT

## 2024-10-01 PROCEDURE — 84443 ASSAY THYROID STIM HORMONE: CPT

## 2024-10-01 PROCEDURE — 83970 ASSAY OF PARATHORMONE: CPT

## 2024-10-01 PROCEDURE — 82310 ASSAY OF CALCIUM: CPT

## 2024-10-01 PROCEDURE — 36415 COLL VENOUS BLD VENIPUNCTURE: CPT

## 2024-10-01 PROCEDURE — 71046 X-RAY EXAM CHEST 2 VIEWS: CPT

## 2024-11-14 ENCOUNTER — HOSPITAL ENCOUNTER (OUTPATIENT)
Dept: GENERAL RADIOLOGY | Facility: HOSPITAL | Age: 89
Discharge: HOME | End: 2024-11-14
Payer: MEDICARE

## 2024-11-14 ENCOUNTER — TRANSCRIBE ORDERS (OUTPATIENT)
Dept: LAB | Facility: HOSPITAL | Age: 89
End: 2024-11-14
Payer: MEDICARE

## 2024-11-14 ENCOUNTER — LAB (OUTPATIENT)
Dept: LAB | Facility: HOSPITAL | Age: 89
End: 2024-11-14
Payer: MEDICARE

## 2024-11-14 DIAGNOSIS — R06.09 DYSPNEA ON EXERTION: ICD-10-CM

## 2024-11-14 DIAGNOSIS — R06.09 DYSPNEA ON EXERTION: Primary | ICD-10-CM

## 2024-11-14 DIAGNOSIS — E03.9 MYXEDEMA HEART DISEASE: ICD-10-CM

## 2024-11-14 DIAGNOSIS — I51.9 MYXEDEMA HEART DISEASE: ICD-10-CM

## 2024-11-14 LAB
ALBUMIN SERPL-MCNC: 4.3 G/DL (ref 3.5–5.2)
ALBUMIN/GLOB SERPL: 1.9 G/DL
ALP SERPL-CCNC: 102 U/L (ref 39–117)
ALT SERPL W P-5'-P-CCNC: 27 U/L (ref 1–33)
ANION GAP SERPL CALCULATED.3IONS-SCNC: 13 MMOL/L (ref 5–15)
AST SERPL-CCNC: 26 U/L (ref 1–32)
BASOPHILS # BLD AUTO: 0.01 10*3/MM3 (ref 0–0.2)
BASOPHILS NFR BLD AUTO: 0.2 % (ref 0–1.5)
BILIRUB SERPL-MCNC: 0.5 MG/DL (ref 0–1.2)
BUN SERPL-MCNC: 33 MG/DL (ref 8–23)
BUN/CREAT SERPL: 26.6 (ref 7–25)
CALCIUM SPEC-SCNC: 9.5 MG/DL (ref 8.2–9.6)
CHLORIDE SERPL-SCNC: 101 MMOL/L (ref 98–107)
CO2 SERPL-SCNC: 24 MMOL/L (ref 22–29)
CREAT SERPL-MCNC: 1.24 MG/DL (ref 0.57–1)
DEPRECATED RDW RBC AUTO: 43.8 FL (ref 37–54)
EGFRCR SERPLBLD CKD-EPI 2021: 40.9 ML/MIN/1.73
EOSINOPHIL # BLD AUTO: 0.07 10*3/MM3 (ref 0–0.4)
EOSINOPHIL NFR BLD AUTO: 1.5 % (ref 0.3–6.2)
ERYTHROCYTE [DISTWIDTH] IN BLOOD BY AUTOMATED COUNT: 12.8 % (ref 12.3–15.4)
GLOBULIN UR ELPH-MCNC: 2.3 GM/DL
GLUCOSE SERPL-MCNC: 100 MG/DL (ref 65–99)
HCT VFR BLD AUTO: 34.1 % (ref 34–46.6)
HGB BLD-MCNC: 11.7 G/DL (ref 12–15.9)
IMM GRANULOCYTES # BLD AUTO: 0.03 10*3/MM3 (ref 0–0.05)
IMM GRANULOCYTES NFR BLD AUTO: 0.6 % (ref 0–0.5)
LYMPHOCYTES # BLD AUTO: 0.82 10*3/MM3 (ref 0.7–3.1)
LYMPHOCYTES NFR BLD AUTO: 17.7 % (ref 19.6–45.3)
MCH RBC QN AUTO: 32.9 PG (ref 26.6–33)
MCHC RBC AUTO-ENTMCNC: 34.3 G/DL (ref 31.5–35.7)
MCV RBC AUTO: 95.8 FL (ref 79–97)
MONOCYTES # BLD AUTO: 0.44 10*3/MM3 (ref 0.1–0.9)
MONOCYTES NFR BLD AUTO: 9.5 % (ref 5–12)
NEUTROPHILS NFR BLD AUTO: 3.26 10*3/MM3 (ref 1.7–7)
NEUTROPHILS NFR BLD AUTO: 70.5 % (ref 42.7–76)
NRBC BLD AUTO-RTO: 0 /100 WBC (ref 0–0.2)
NT-PROBNP SERPL-MCNC: 2884 PG/ML (ref 0–1800)
PLATELET # BLD AUTO: 143 10*3/MM3 (ref 140–450)
PMV BLD AUTO: 9.8 FL (ref 6–12)
POTASSIUM SERPL-SCNC: 5 MMOL/L (ref 3.5–5.2)
PROT SERPL-MCNC: 6.6 G/DL (ref 6–8.5)
RBC # BLD AUTO: 3.56 10*6/MM3 (ref 3.77–5.28)
SODIUM SERPL-SCNC: 138 MMOL/L (ref 136–145)
T4 FREE SERPL-MCNC: 1.14 NG/DL (ref 0.92–1.68)
TSH SERPL DL<=0.05 MIU/L-ACNC: 12.2 UIU/ML (ref 0.27–4.2)
WBC NRBC COR # BLD AUTO: 4.63 10*3/MM3 (ref 3.4–10.8)

## 2024-11-14 PROCEDURE — 84439 ASSAY OF FREE THYROXINE: CPT

## 2024-11-14 PROCEDURE — 84443 ASSAY THYROID STIM HORMONE: CPT

## 2024-11-14 PROCEDURE — 71046 X-RAY EXAM CHEST 2 VIEWS: CPT

## 2024-11-14 PROCEDURE — 80053 COMPREHEN METABOLIC PANEL: CPT

## 2024-11-14 PROCEDURE — 83880 ASSAY OF NATRIURETIC PEPTIDE: CPT

## 2024-11-14 PROCEDURE — 36415 COLL VENOUS BLD VENIPUNCTURE: CPT

## 2024-11-14 PROCEDURE — 85025 COMPLETE CBC W/AUTO DIFF WBC: CPT

## 2024-11-29 ENCOUNTER — HOSPITAL ENCOUNTER (OUTPATIENT)
Facility: HOSPITAL | Age: 89
Discharge: HOME | End: 2024-11-29
Attending: STUDENT IN AN ORGANIZED HEALTH CARE EDUCATION/TRAINING PROGRAM | Admitting: STUDENT IN AN ORGANIZED HEALTH CARE EDUCATION/TRAINING PROGRAM
Payer: MEDICARE

## 2024-11-29 VITALS
HEIGHT: 60 IN | WEIGHT: 96 LBS | HEART RATE: 51 BPM | RESPIRATION RATE: 18 BRPM | BODY MASS INDEX: 18.85 KG/M2 | TEMPERATURE: 98 F | SYSTOLIC BLOOD PRESSURE: 180 MMHG | OXYGEN SATURATION: 96 % | DIASTOLIC BLOOD PRESSURE: 84 MMHG

## 2024-11-29 DIAGNOSIS — S51.011A SKIN TEAR OF RIGHT ELBOW WITHOUT COMPLICATION, INITIAL ENCOUNTER: Primary | ICD-10-CM

## 2024-11-29 PROCEDURE — G0463 HOSPITAL OUTPT CLINIC VISIT: HCPCS | Performed by: STUDENT IN AN ORGANIZED HEALTH CARE EDUCATION/TRAINING PROGRAM

## 2024-11-29 PROCEDURE — 99213 OFFICE O/P EST LOW 20 MIN: CPT | Performed by: STUDENT IN AN ORGANIZED HEALTH CARE EDUCATION/TRAINING PROGRAM

## 2024-11-29 NOTE — FSED PROVIDER NOTE
Subjective   History of Present Illness  Very pleasant 89-year-old female presents to the emergency department with a skin tear to the right elbow area.  This happened 3 days ago when she had a shower door.  Patient has been using bandages at home to try and mitigate the bleeding.  She states that she takes a baby aspirin daily.  No other anticoagulant medication.  No swelling of the joint.  The patient denies any decrease in range of motion or pain but simply is here because it continues to ooze blood when they change the bandaging.      Review of Systems   Constitutional:  Negative for activity change, appetite change, diaphoresis and fatigue.   All other systems reviewed and are negative.      Past Medical History:   Diagnosis Date    Anemia     Arthritis     Atrial fibrillation     Intermittently    Colon cancer 2012    Stage III    Drug therapy 2012    pill for 6 months    H/O Herpes simplex     virus of the lip    Hypertension     Intervertebral disk disease     Polio     Trigeminal neuralgia        Allergies   Allergen Reactions    Cardizem [Diltiazem Hcl] Unknown - Low Severity    Amoxicillin Rash       Past Surgical History:   Procedure Laterality Date    ARTHROSCOPY SHOULDER W/ OPEN ROTATOR CUFF REPAIR      BREAST BIOPSY Left     at least 25 years ago.    COLON RESECTION  08/2015    JOINT REPLACEMENT  1947    Elbow transplant and shoulder fusion    TONSILLECTOMY         Family History   Problem Relation Age of Onset    Colon cancer Father         Late in life    Heart disease Father     No Known Problems Mother        Social History     Socioeconomic History    Marital status:      Spouse name: Burt   Tobacco Use    Smoking status: Never    Smokeless tobacco: Never   Vaping Use    Vaping status: Never Used   Substance and Sexual Activity    Alcohol use: No    Drug use: No    Sexual activity: Not Currently     Partners: Male           Objective   Physical Exam  Vitals and nursing note reviewed.    Constitutional:       General: She is not in acute distress.     Appearance: Normal appearance. She is not ill-appearing, toxic-appearing or diaphoretic.   HENT:      Head: Normocephalic and atraumatic.      Right Ear: Tympanic membrane and external ear normal.      Left Ear: Tympanic membrane and external ear normal.      Nose: Nose normal. No congestion or rhinorrhea.      Mouth/Throat:      Mouth: Mucous membranes are moist.      Pharynx: No oropharyngeal exudate or posterior oropharyngeal erythema.   Eyes:      Conjunctiva/sclera: Conjunctivae normal.      Pupils: Pupils are equal, round, and reactive to light.   Cardiovascular:      Rate and Rhythm: Normal rate and regular rhythm.      Pulses: Normal pulses.   Pulmonary:      Effort: Pulmonary effort is normal. No respiratory distress.      Breath sounds: Normal breath sounds. No stridor. No wheezing, rhonchi or rales.   Chest:      Chest wall: No tenderness.   Abdominal:      General: There is no distension.      Palpations: Abdomen is soft. There is no mass.      Tenderness: There is no guarding or rebound.   Musculoskeletal:         General: No swelling or deformity. Normal range of motion.      Cervical back: Normal range of motion and neck supple. No rigidity or tenderness.   Skin:     General: Skin is warm.      Capillary Refill: Capillary refill takes less than 2 seconds.      Coloration: Skin is not pale.      Comments: There is a 2 x 2 cm patch of skin tear over the dorsal lateral aspect of the right elbow.  Very minimal petechial type bleeding noted.  No effusion, no ecchymosis, no crepitus, no decrease in range of motion of the joint   Neurological:      General: No focal deficit present.      Mental Status: She is alert and oriented to person, place, and time. Mental status is at baseline.   Psychiatric:         Mood and Affect: Mood normal.         Thought Content: Thought content normal.         Procedures           ED Course                                            Medical Decision Making  Laceration Procedure Note    Time: 17:12  Confirmed correct: Patient, procedure, side, site  Consent: Patient, verbal     Description of repair: Length 2x2 cm  Location: Right elbow  Shape: Circular/patch  Depth: Superficial  Details: CleanSuperficial  Neurovascular / tendon exam: Intact  Anesthesia: None  Irrigation: Copious saline  Debridement: moderate to extensive to thoroughly cleanse and debride the area  Skin closure: # Skin adhesive  Complexity: simle     Post procedure exam: Circulation, motor, sensory intact  Complications: None  Patient tolerated: Well  Performed by: Kannan Sebastian DO  Total time: 10 minutes  =======================      MDM:    Escalation of care including admission/observation considered    - Discussions of management with other providers:  None    - Discussed/reviewed with Radiology regarding test interpretation    - Independent interpretation: None    - Additional patient history obtained from: Partner    - Review of external non-ED record (if available):  Prior Inpt record, Office record, Outpt record, Prior Outpt labs, PCP record, Outside ED record, Other    - Chronic conditions affecting care: See HPI and medical Hx.    - Social Determinants of health significantly affecting care:  None        Medical Decision Making Discussion:    Well-appearing 89-year-old presents to the emergency department with a small patch of skin tear to the right elbow.  This was 3 days old.  The wound is clean and well-appearing and she has been trying to keep a bandage.  I placed Dermabond over the skin in order to try to help it cauterize and provide a barrier.  Patient tolerated this well and the wound actually improved significantly.  She is instructed to keep it clean, nonadhesive gauzes given for home use.  Otherwise stable for discharge with follow-up to PCP.    The patient has been given very strict return precautions to return to the emergency  department should there be any acute change or worsening of their condition.  I have explained my findings and the patient has expressed understanding to me.  I explained that the work-up performed in the ED has been based on the specific complaint and concern, as the nature of emergency medicine is complaint driven and they understand that new symptoms may arise.  I have told them that, should there be any new symptoms, worsening or changing symptoms, a new work-up may be indicated that they are encouraged to return to the emergency department or promptly contact their primary care physician. We have employed a shared decision-making process as the discussion of their disposition.  The patient has been educated as to the nature of the visit, the tests and work-up performed and the findings from today's visit. At this time, there does not appear to be any acute emergent process that necessitates admission to the hospital, however, the patient understands that this can change unexpectedly. At this time, the patient is stable for discharge home and agrees to follow-up with her primary care physician in the next 24 to 48 hours or earlier should they be able to obtain an appointment.    The patient was counseled regarding diagnostic results and treatment plan and patient has indicated understanding of these instructions.          Final diagnoses:   Skin tear of right elbow without complication, initial encounter       ED Disposition  ED Disposition       ED Disposition   Discharge    Condition   Good    Comment   --               Angelica Anthony MD  [facility] [place] [address]  [phone]               Medication List      No changes were made to your prescriptions during this visit.

## 2025-02-03 ENCOUNTER — LAB (OUTPATIENT)
Dept: LAB | Facility: HOSPITAL | Age: OVER 89
End: 2025-02-03
Payer: MEDICARE

## 2025-02-03 ENCOUNTER — TRANSCRIBE ORDERS (OUTPATIENT)
Dept: LAB | Facility: HOSPITAL | Age: OVER 89
End: 2025-02-03
Payer: MEDICARE

## 2025-02-03 DIAGNOSIS — I51.9 MYXEDEMA HEART DISEASE: Primary | ICD-10-CM

## 2025-02-03 DIAGNOSIS — R06.09 DYSPNEA ON EXERTION: ICD-10-CM

## 2025-02-03 DIAGNOSIS — E03.9 MYXEDEMA HEART DISEASE: Primary | ICD-10-CM

## 2025-02-03 DIAGNOSIS — I51.9 MYXEDEMA HEART DISEASE: ICD-10-CM

## 2025-02-03 DIAGNOSIS — E03.9 MYXEDEMA HEART DISEASE: ICD-10-CM

## 2025-02-03 LAB
ALBUMIN SERPL-MCNC: 4.5 G/DL (ref 3.5–5.2)
ALBUMIN/GLOB SERPL: 1.6 G/DL
ALP SERPL-CCNC: 116 U/L (ref 39–117)
ALT SERPL W P-5'-P-CCNC: 22 U/L (ref 1–33)
ANION GAP SERPL CALCULATED.3IONS-SCNC: 10.1 MMOL/L (ref 5–15)
AST SERPL-CCNC: 25 U/L (ref 1–32)
BASOPHILS # BLD AUTO: 0.02 10*3/MM3 (ref 0–0.2)
BASOPHILS NFR BLD AUTO: 0.3 % (ref 0–1.5)
BILIRUB SERPL-MCNC: 0.3 MG/DL (ref 0–1.2)
BUN SERPL-MCNC: 39 MG/DL (ref 8–23)
BUN/CREAT SERPL: 29.3 (ref 7–25)
CALCIUM SPEC-SCNC: 9.6 MG/DL (ref 8.2–9.6)
CHLORIDE SERPL-SCNC: 99 MMOL/L (ref 98–107)
CO2 SERPL-SCNC: 27.9 MMOL/L (ref 22–29)
CREAT SERPL-MCNC: 1.33 MG/DL (ref 0.57–1)
DEPRECATED RDW RBC AUTO: 42.9 FL (ref 37–54)
EGFRCR SERPLBLD CKD-EPI 2021: 37.6 ML/MIN/1.73
EOSINOPHIL # BLD AUTO: 0.1 10*3/MM3 (ref 0–0.4)
EOSINOPHIL NFR BLD AUTO: 1.7 % (ref 0.3–6.2)
ERYTHROCYTE [DISTWIDTH] IN BLOOD BY AUTOMATED COUNT: 11.9 % (ref 12.3–15.4)
GLOBULIN UR ELPH-MCNC: 2.9 GM/DL
GLUCOSE SERPL-MCNC: 91 MG/DL (ref 65–99)
HCT VFR BLD AUTO: 39.1 % (ref 34–46.6)
HGB BLD-MCNC: 12.4 G/DL (ref 12–15.9)
IMM GRANULOCYTES # BLD AUTO: 0.01 10*3/MM3 (ref 0–0.05)
IMM GRANULOCYTES NFR BLD AUTO: 0.2 % (ref 0–0.5)
LYMPHOCYTES # BLD AUTO: 1.48 10*3/MM3 (ref 0.7–3.1)
LYMPHOCYTES NFR BLD AUTO: 25.5 % (ref 19.6–45.3)
MCH RBC QN AUTO: 31 PG (ref 26.6–33)
MCHC RBC AUTO-ENTMCNC: 31.7 G/DL (ref 31.5–35.7)
MCV RBC AUTO: 97.8 FL (ref 79–97)
MONOCYTES # BLD AUTO: 0.61 10*3/MM3 (ref 0.1–0.9)
MONOCYTES NFR BLD AUTO: 10.5 % (ref 5–12)
NEUTROPHILS NFR BLD AUTO: 3.58 10*3/MM3 (ref 1.7–7)
NEUTROPHILS NFR BLD AUTO: 61.8 % (ref 42.7–76)
NRBC BLD AUTO-RTO: 0 /100 WBC (ref 0–0.2)
NT-PROBNP SERPL-MCNC: 2973 PG/ML (ref 0–1800)
PLATELET # BLD AUTO: 143 10*3/MM3 (ref 140–450)
PMV BLD AUTO: 9.5 FL (ref 6–12)
POTASSIUM SERPL-SCNC: 4.8 MMOL/L (ref 3.5–5.2)
PROT SERPL-MCNC: 7.4 G/DL (ref 6–8.5)
RBC # BLD AUTO: 4 10*6/MM3 (ref 3.77–5.28)
SODIUM SERPL-SCNC: 137 MMOL/L (ref 136–145)
T4 FREE SERPL-MCNC: 0.82 NG/DL (ref 0.92–1.68)
TSH SERPL DL<=0.05 MIU/L-ACNC: 16.7 UIU/ML (ref 0.27–4.2)
WBC NRBC COR # BLD AUTO: 5.8 10*3/MM3 (ref 3.4–10.8)

## 2025-02-03 PROCEDURE — 84443 ASSAY THYROID STIM HORMONE: CPT

## 2025-02-03 PROCEDURE — 83880 ASSAY OF NATRIURETIC PEPTIDE: CPT

## 2025-02-03 PROCEDURE — 84439 ASSAY OF FREE THYROXINE: CPT

## 2025-02-03 PROCEDURE — 36415 COLL VENOUS BLD VENIPUNCTURE: CPT

## 2025-02-03 PROCEDURE — 85025 COMPLETE CBC W/AUTO DIFF WBC: CPT

## 2025-02-03 PROCEDURE — 80053 COMPREHEN METABOLIC PANEL: CPT

## 2025-02-13 ENCOUNTER — TELEPHONE (OUTPATIENT)
Dept: CARDIOLOGY | Facility: CLINIC | Age: OVER 89
End: 2025-02-13
Payer: MEDICARE

## 2025-02-13 NOTE — TELEPHONE ENCOUNTER
I called and put Inge on to see Dr. Bloom next Wed 2/19/25 at 12:20p.    Dr. Bloom had received a call from Dr. Anthony to get her on for COLVIN and a possible heart cath.    Halima

## 2025-03-03 ENCOUNTER — OFFICE VISIT (OUTPATIENT)
Age: OVER 89
End: 2025-03-03
Payer: MEDICARE

## 2025-03-03 VITALS
SYSTOLIC BLOOD PRESSURE: 144 MMHG | DIASTOLIC BLOOD PRESSURE: 80 MMHG | HEIGHT: 60 IN | HEART RATE: 69 BPM | WEIGHT: 97 LBS | BODY MASS INDEX: 19.04 KG/M2

## 2025-03-03 DIAGNOSIS — I20.0 UNSTABLE ANGINA: ICD-10-CM

## 2025-03-03 DIAGNOSIS — R94.39 ABNORMAL NUCLEAR STRESS TEST: ICD-10-CM

## 2025-03-03 DIAGNOSIS — R06.09 DOE (DYSPNEA ON EXERTION): Primary | ICD-10-CM

## 2025-03-03 PROCEDURE — 99204 OFFICE O/P NEW MOD 45 MIN: CPT | Performed by: INTERNAL MEDICINE

## 2025-03-03 PROCEDURE — 93000 ELECTROCARDIOGRAM COMPLETE: CPT | Performed by: INTERNAL MEDICINE

## 2025-03-03 RX ORDER — LEVOTHYROXINE SODIUM 25 MCG
25 TABLET ORAL
COMMUNITY
Start: 2025-01-10 | End: 2025-03-03

## 2025-03-03 RX ORDER — LEVOTHYROXINE SODIUM 50 MCG
50 TABLET ORAL
COMMUNITY
Start: 2025-02-07

## 2025-03-03 RX ORDER — BRIMONIDINE TARTRATE 2 MG/ML
1 SOLUTION/ DROPS OPHTHALMIC 2 TIMES DAILY
COMMUNITY
Start: 2025-02-26

## 2025-03-03 NOTE — H&P (VIEW-ONLY)
Inge Borjas  8/6/1932  Date of Office Visit: 03/03/25  Encounter Provider: Mukul Bloom MD  Place of Service: [facility] CARDIOLOGY      CHIEF COMPLAINT:  COLVIN  Chronic atrial fibrillation      HISTORY OF PRESENT ILLNESS:  Inge Dominguez in consultation today.  She is a 89-year-old female with a medical history of now chronic atrial fibrillation, colon cancer, frailty, essential hypertension, who presents to me secondary to dyspnea on exertion.  She underwent evaluation for atrial fibrillation and had a transthoracic echocardiogram with no significant valvular heart disease and a preserved ejection fraction.  She wore a monitor which I reviewed.  Ventricular rate is very well controlled and occasionally bradycardic.  She had a stress test which was performed in 2021 with no perfusion defects but there was report of transient ischemic dilatation.  Since our last visit she has continued to complain of shortness of air that is progressed to just minimal levels of activity.  Patient reports that she has severe dyspnea that requires her to stop after walking around 10 to 15 feet which is worse than it has been.  She denies any orthopnea or PND.  She has no lower extremity edema.  Chest does feel tight  when she is short of air    Review of Systems   Constitutional: Negative for fever and malaise/fatigue.   HENT:  Negative for nosebleeds and sore throat.    Eyes:  Negative for blurred vision and double vision.   Cardiovascular:  Negative for chest pain, claudication, palpitations and syncope.   Respiratory:  Negative for cough, shortness of breath and snoring.    Endocrine: Negative for cold intolerance, heat intolerance and polydipsia.   Skin:  Negative for itching, poor wound healing and rash.   Musculoskeletal:  Negative for joint pain, joint swelling, muscle weakness and myalgias.   Gastrointestinal:  Negative for abdominal pain, melena, nausea and vomiting.    Neurological:  Negative for light-headedness, loss of balance, seizures, vertigo and weakness.   Psychiatric/Behavioral:  Negative for altered mental status and depression.        Past Medical History:   Diagnosis Date    Anemia     Arthritis     Atrial fibrillation     Intermittently    Colon cancer 2012    Stage III    Drug therapy 2012    pill for 6 months    H/O Herpes simplex     virus of the lip    Hypertension     Intervertebral disk disease     Polio     Trigeminal neuralgia        The following portions of the patient's history were reviewed and updated as appropriate: Social history , Family history and Surgical history     Current Outpatient Medications on File Prior to Visit   Medication Sig Dispense Refill    acetaminophen (TYLENOL) 650 MG 8 hr tablet Take 1 tablet by mouth Every 8 (Eight) Hours As Needed for Mild Pain.      aspirin 81 MG EC tablet Take 1 tablet by mouth Daily.      atenolol (TENORMIN) 100 MG tablet Take 1 tablet (100 mg total) by mouth 2 (Two) Times a Day 240 tablet 1    brimonidine (ALPHAGAN) 0.2 % ophthalmic solution Administer 1 drop to both eyes 2 (Two) Times a Day.      calcium carbonate-vitamin d 600-400 MG-UNIT per tablet Take 1 tablet by mouth Daily.      carBAMazepine XR (TEGretol  XR) 100 MG 12 hr tablet Take 1 tablet by mouth 2 (Two) Times a Day. 180 tablet 4    doxazosin (CARDURA) 4 MG tablet Take 1 tablet by mouth every night at bedtime. 90 tablet 4    Synthroid 50 MCG tablet Take 1 tablet by mouth Every Morning.      [DISCONTINUED] Synthroid 25 MCG tablet Take 1 tablet by mouth Every Morning.      fluticasone (FLONASE) 50 MCG/ACT nasal spray 2 sprays into the nostril(s) as directed by provider Daily.      furosemide (LASIX) 20 MG tablet Take 1 tablet by mouth daily. 90 tablet 4    Multiple Vitamins-Minerals (CENTRUM ADULTS PO) Take  by mouth.      promethazine-dextromethorphan (PROMETHAZINE-DM) 6.25-15 MG/5ML syrup Take 5 mL by mouth 4 (Four) Times a Day As Needed for  "Cough. 180 mL 0    spironolactone (ALDACTONE) 25 MG tablet Take 1 tablet by mouth Daily.      traMADol (ULTRAM) 50 MG tablet Take 1 tablet by mouth Every 4 (Four) Hours As Needed for Moderate Pain. 60 tablet 0    traZODone (DESYREL) 50 MG tablet Take 1 tablet by mouth every night at bedtime. 90 tablet 4    [DISCONTINUED] amLODIPine (NORVASC) 5 MG tablet Take 1 tablet by mouth Daily. 90 tablet 4    [DISCONTINUED] losartan (COZAAR) 100 MG tablet Take 1 tablet by mouth Daily.       No current facility-administered medications on file prior to visit.       Allergies   Allergen Reactions    Cardizem [Diltiazem Hcl] Unknown - Low Severity    Amoxicillin Rash       Vitals:    03/03/25 1317   Height: 152.4 cm (60\")     Body mass index is 18.75 kg/m².   Constitutional:       Appearance: Well-developed.   Eyes:      General: No scleral icterus.     Conjunctiva/sclera: Conjunctivae normal.   HENT:      Head: Normocephalic and atraumatic.   Neck:      Thyroid: No thyromegaly.      Vascular: Normal carotid pulses. No carotid bruit, hepatojugular reflux or JVD.      Trachea: No tracheal deviation.   Pulmonary:      Effort: No respiratory distress.      Breath sounds: Normal breath sounds. No decreased breath sounds. No wheezing. No rhonchi. No rales.   Chest:      Chest wall: Not tender to palpatation.   Cardiovascular:      Normal rate. Regularly irregular rhythm.      No gallop.    Pulses:     Carotid: 2+ bilaterally.     Radial: 2+ bilaterally.     Femoral: 2+ bilaterally.     Dorsalis pedis: 2+ bilaterally.     Posterior tibial: 2+ bilaterally.  Edema:     Peripheral edema absent.   Abdominal:      General: Bowel sounds are normal. There is no distension.      Palpations: Abdomen is soft.      Tenderness: There is no abdominal tenderness.   Musculoskeletal:         General: No deformity.      Cervical back: Normal range of motion and neck supple. Skin:     Findings: No erythema or rash.   Neurological:      Mental Status: " "Alert and oriented to person, place, and time.      Sensory: No sensory deficit.   Psychiatric:         Behavior: Behavior normal.            Lab Results   Component Value Date    WBC 5.80 02/03/2025    HGB 12.4 02/03/2025    HCT 39.1 02/03/2025    MCV 97.8 (H) 02/03/2025     02/03/2025       Lab Results   Component Value Date    GLUCOSE 91 02/03/2025    BUN 39 (H) 02/03/2025    CREATININE 1.33 (H) 02/03/2025    EGFRIFNONA 80 06/09/2017    BCR 29.3 (H) 02/03/2025    K 4.8 02/03/2025    CO2 27.9 02/03/2025    CALCIUM 9.6 02/03/2025    ALBUMIN 4.5 02/03/2025    AST 25 02/03/2025    ALT 22 02/03/2025       Lab Results   Component Value Date    GLUCOSE 91 02/03/2025    CALCIUM 9.6 02/03/2025     02/03/2025    K 4.8 02/03/2025    CO2 27.9 02/03/2025    CL 99 02/03/2025    BUN 39 (H) 02/03/2025    CREATININE 1.33 (H) 02/03/2025    EGFRIFNONA 80 06/09/2017    BCR 29.3 (H) 02/03/2025    ANIONGAP 10.1 02/03/2025       No results found for: \"CHOL\", \"CHLPL\", \"TRIG\", \"HDL\", \"LDL\", \"LDLDIRECT\"      ECG 12 Lead    Date/Time: 3/3/2025 2:02 PM  Performed by: Mukul Bloom MD    Authorized by: Mukul Bloom MD  Comparison: compared with previous ECG from 10/1/2024  Similar to previous ECG  Rhythm: atrial fibrillation  Rate: normal  QRS axis: normal              8/31/2021  Left atrial volume is moderately increased.  The right atrial cavity is moderately dilated.  Mild to moderate mitral valve regurgitation is present.  Estimated left ventricular EF = 60% Left ventricular systolic function is normal.  Left ventricular diastolic function was indeterminate.         9/27/2021  ECG evidence of myocardial ischemia. Diffuse 1.5 mm downsloping ST depression is noted at peak stress with 1 mm ST elevation in aVR.  Myocardial perfusion imaging with no perfusion defect however transient ischemic dilatation score is elevated at 1.42. Considering significant EKG changes cannot exclude ischemia on this stress " study.    8/31/21  An abnormal monitor study.  Atrial fibrillation is present throughout  Average heart rate 62 bpm, range  bpm  Maximal R-R interval 2.9 seconds at 8:13 AM          Results for orders placed during the hospital encounter of 08/31/21    Adult Transthoracic Echo Complete W/ Cont if Necessary Per Protocol    Interpretation Summary  · Left atrial volume is moderately increased.  · The right atrial cavity is moderately dilated.  · Mild to moderate mitral valve regurgitation is present.  · Estimated left ventricular EF = 60% Left ventricular systolic function is normal.  · Left ventricular diastolic function was indeterminate.      DISCUSSION/SUMMARY  89-year-old female with a medical history of chronic atrial fibrillation, essential hypertension, frailty, colon cancer, mild to moderate mitral valve regurgitation who presents to me secondary to her atrial fibrillation and COLVIN.  She had a stress test which showed some ST depression and transient ischemic dilatation.  Patient has been more short of breath as of late and has very limited activity secondary to her dyspnea.  She does not appear to be volume overloaded and I think it is unlikely that valvular heart disease is playing a significant role.  No obvious pulmonary etiology.    1.  COLVIN  - Patient has severe dyspnea with minimal exertion that appears to be progressive.  She is also previously underwent a stress test with evidence of transient ischemic dilatation on the stress test.  -She does not appear to be volume overloaded.  Prior chest x-rays have been reviewed with no significant etiology.  -I suggest that we started with a transthoracic echocardiogram to evaluate her mild to moderate mitral valve regurgitation, however her murmur does not sound significant.  -Left and right heart catheterization also recommended in the setting of her progressive symptoms.    2.  Chronic atrial fibrillation  -Finally been her off of anticoagulant therapy  secondary to her frailty and advanced age.  -Continue atenolol at current dose.  I recommend that she decrease her digoxin every other day and plan on moving off of that in the future if her rate maintains good control.

## 2025-03-12 ENCOUNTER — HOSPITAL ENCOUNTER (OUTPATIENT)
Dept: CARDIOLOGY | Facility: HOSPITAL | Age: OVER 89
Discharge: HOME | End: 2025-03-12
Admitting: INTERNAL MEDICINE
Payer: MEDICARE

## 2025-03-12 VITALS
WEIGHT: 97 LBS | SYSTOLIC BLOOD PRESSURE: 138 MMHG | BODY MASS INDEX: 19.04 KG/M2 | HEIGHT: 60 IN | DIASTOLIC BLOOD PRESSURE: 76 MMHG

## 2025-03-12 DIAGNOSIS — R06.09 DOE (DYSPNEA ON EXERTION): ICD-10-CM

## 2025-03-12 LAB
AORTIC ARCH: 2.9 CM
AORTIC DIMENSIONLESS INDEX: 0.64 (DI)
ASCENDING AORTA: 3.6 CM
AV MEAN PRESS GRAD SYS DOP V1V2: 2 MMHG
AV VMAX SYS DOP: 88.2 CM/SEC
BH CV ECHO MEAS - ACS: 1.69 CM
BH CV ECHO MEAS - AO MAX PG: 3.1 MMHG
BH CV ECHO MEAS - AO ROOT DIAM: 2.9 CM
BH CV ECHO MEAS - AO V2 VTI: 21 CM
BH CV ECHO MEAS - AVA(I,D): 1.62 CM2
BH CV ECHO MEAS - EDV(CUBED): 44.9 ML
BH CV ECHO MEAS - EDV(MOD-SP2): 63 ML
BH CV ECHO MEAS - EDV(MOD-SP4): 60 ML
BH CV ECHO MEAS - EF(MOD-SP2): 63.5 %
BH CV ECHO MEAS - EF(MOD-SP4): 60 %
BH CV ECHO MEAS - ESV(CUBED): 12.8 ML
BH CV ECHO MEAS - ESV(MOD-SP2): 23 ML
BH CV ECHO MEAS - ESV(MOD-SP4): 24 ML
BH CV ECHO MEAS - FS: 34.2 %
BH CV ECHO MEAS - IVS/LVPW: 1.15 CM
BH CV ECHO MEAS - IVSD: 1.03 CM
BH CV ECHO MEAS - LAT PEAK E' VEL: 10.3 CM/SEC
BH CV ECHO MEAS - LV DIASTOLIC VOL/BSA (35-75): 43.7 CM2
BH CV ECHO MEAS - LV MASS(C)D: 100.9 GRAMS
BH CV ECHO MEAS - LV MAX PG: 1.4 MMHG
BH CV ECHO MEAS - LV MEAN PG: 1 MMHG
BH CV ECHO MEAS - LV SYSTOLIC VOL/BSA (12-30): 17.5 CM2
BH CV ECHO MEAS - LV V1 MAX: 59.1 CM/SEC
BH CV ECHO MEAS - LV V1 VTI: 13.4 CM
BH CV ECHO MEAS - LVIDD: 3.6 CM
BH CV ECHO MEAS - LVIDS: 2.34 CM
BH CV ECHO MEAS - LVOT AREA: 2.5 CM2
BH CV ECHO MEAS - LVOT DIAM: 1.8 CM
BH CV ECHO MEAS - LVPWD: 0.9 CM
BH CV ECHO MEAS - MED PEAK E' VEL: 8.5 CM/SEC
BH CV ECHO MEAS - MR MAX PG: 144.8 MMHG
BH CV ECHO MEAS - MR MAX VEL: 601.6 CM/SEC
BH CV ECHO MEAS - MV DEC SLOPE: 500.5 CM/SEC2
BH CV ECHO MEAS - MV DEC TIME: 0.16 SEC
BH CV ECHO MEAS - MV E MAX VEL: 124 CM/SEC
BH CV ECHO MEAS - MV MAX PG: 3.7 MMHG
BH CV ECHO MEAS - MV MEAN PG: 1.93 MMHG
BH CV ECHO MEAS - MV P1/2T: 58.8 MSEC
BH CV ECHO MEAS - MV V2 VTI: 17 CM
BH CV ECHO MEAS - MVA(P1/2T): 3.7 CM2
BH CV ECHO MEAS - MVA(VTI): 2 CM2
BH CV ECHO MEAS - PA ACC TIME: 0.15 SEC
BH CV ECHO MEAS - PA V2 MAX: 47.5 CM/SEC
BH CV ECHO MEAS - RAP SYSTOLE: 8 MMHG
BH CV ECHO MEAS - RV MAX PG: 0.68 MMHG
BH CV ECHO MEAS - RV V1 MAX: 41.1 CM/SEC
BH CV ECHO MEAS - RV V1 VTI: 8.7 CM
BH CV ECHO MEAS - RVSP: 56.3 MMHG
BH CV ECHO MEAS - SV(LVOT): 34.1 ML
BH CV ECHO MEAS - SV(MOD-SP2): 40 ML
BH CV ECHO MEAS - SV(MOD-SP4): 36 ML
BH CV ECHO MEAS - SVI(LVOT): 24.8 ML/M2
BH CV ECHO MEAS - SVI(MOD-SP2): 29.1 ML/M2
BH CV ECHO MEAS - SVI(MOD-SP4): 26.2 ML/M2
BH CV ECHO MEAS - TAPSE (>1.6): 1.43 CM
BH CV ECHO MEAS - TR MAX PG: 48.3 MMHG
BH CV ECHO MEAS - TR MAX VEL: 347.5 CM/SEC
BH CV ECHO MEASUREMENTS AVERAGE E/E' RATIO: 13.19
BH CV XLRA - RV BASE: 3.3 CM
BH CV XLRA - RV LENGTH: 6.8 CM
BH CV XLRA - RV MID: 3.1 CM
BH CV XLRA - TDI S': 5.3 CM/SEC
LEFT ATRIUM VOLUME INDEX: 43.6 ML/M2
LV EF BIPLANE MOD: 60.6 %
SINUS: 2.7 CM
STJ: 2.27 CM

## 2025-03-12 PROCEDURE — 93306 TTE W/DOPPLER COMPLETE: CPT

## 2025-03-18 ENCOUNTER — TELEPHONE (OUTPATIENT)
Dept: CARDIOLOGY | Facility: CLINIC | Age: OVER 89
End: 2025-03-18
Payer: MEDICARE

## 2025-03-18 NOTE — TELEPHONE ENCOUNTER
Melia from scheduling asked me to call pt, as pt has not received echo results, and was unwilling to schedule left and right heart cath.    I spoke with pt.  She states that her echo was done several weeks ago, and that she has not heard about the results.  Echo was done on 3/12, and I reinforced this with pt.  She would like the results.    I reviewed office note from 3/3 with pt, and discussed with pt that you recommended right and left heart cath at that time.  Pt stated adamantly that she did not see you on 3/3, and that she had only been to our office for echo.  After looking at her calendar, pt states that she did in fact see you on 3/3.  I discussed again the right and left heart cath, and did some education about what it entails at her request.  Pt states that she is willing to go ahead and schedule this now, and I requested that Melia call her back to schedule.    Could you either contact pt directly, or let me know what I can share with pt about the echo?    Thanks so much,  Negrita Ibarra, RN  Triage RN  03/18/25 12:12 EDT

## 2025-03-19 DIAGNOSIS — R06.09 DOE (DYSPNEA ON EXERTION): Primary | ICD-10-CM

## 2025-03-19 NOTE — TELEPHONE ENCOUNTER
Conversation with her regarding her severe mitral and tricuspid valve regurgitation.  I do think a left and right heart catheterization is still reasonable.  I do not think she is going to be somebody who is good to be excited about considering valve interventions but it may give us information regarding her pulmonary pressures and fluid status to see if we can improve that.

## 2025-03-25 ENCOUNTER — HOSPITAL ENCOUNTER (OUTPATIENT)
Facility: HOSPITAL | Age: OVER 89
Discharge: HOME | End: 2025-03-26
Attending: INTERNAL MEDICINE | Admitting: INTERNAL MEDICINE
Payer: MEDICARE

## 2025-03-25 ENCOUNTER — LAB (OUTPATIENT)
Dept: LAB | Facility: HOSPITAL | Age: OVER 89
End: 2025-03-25
Payer: MEDICARE

## 2025-03-25 DIAGNOSIS — R06.09 DOE (DYSPNEA ON EXERTION): Primary | ICD-10-CM

## 2025-03-25 DIAGNOSIS — I20.0 UNSTABLE ANGINA: ICD-10-CM

## 2025-03-25 DIAGNOSIS — R06.09 DOE (DYSPNEA ON EXERTION): ICD-10-CM

## 2025-03-25 LAB
ANION GAP SERPL CALCULATED.3IONS-SCNC: 11 MMOL/L (ref 5–15)
BUN SERPL-MCNC: 39 MG/DL (ref 8–23)
BUN/CREAT SERPL: 26.9 (ref 7–25)
CALCIUM SPEC-SCNC: 9.3 MG/DL (ref 8.2–9.6)
CHLORIDE SERPL-SCNC: 102 MMOL/L (ref 98–107)
CO2 SERPL-SCNC: 26 MMOL/L (ref 22–29)
CREAT SERPL-MCNC: 1.45 MG/DL (ref 0.57–1)
DEPRECATED RDW RBC AUTO: 42.7 FL (ref 37–54)
EGFRCR SERPLBLD CKD-EPI 2021: 33.9 ML/MIN/1.73
ERYTHROCYTE [DISTWIDTH] IN BLOOD BY AUTOMATED COUNT: 12.5 % (ref 12.3–15.4)
GLUCOSE SERPL-MCNC: 95 MG/DL (ref 65–99)
HCT VFR BLD AUTO: 34.7 % (ref 34–46.6)
HCT VFR BLDA CALC: 34 % (ref 38–51)
HCT VFR BLDA CALC: 36 % (ref 38–51)
HGB BLD-MCNC: 11.9 G/DL (ref 12–15.9)
HGB BLDA-MCNC: 11.6 G/DL (ref 12–17)
HGB BLDA-MCNC: 12.2 G/DL (ref 12–17)
MCH RBC QN AUTO: 32 PG (ref 26.6–33)
MCHC RBC AUTO-ENTMCNC: 34.3 G/DL (ref 31.5–35.7)
MCV RBC AUTO: 93.3 FL (ref 79–97)
PLATELET # BLD AUTO: 129 10*3/MM3 (ref 140–450)
PMV BLD AUTO: 9.7 FL (ref 6–12)
POTASSIUM SERPL-SCNC: 4.7 MMOL/L (ref 3.5–5.2)
RBC # BLD AUTO: 3.72 10*6/MM3 (ref 3.77–5.28)
SAO2 % BLDA: 56 % (ref 95–98)
SAO2 % BLDA: 95 % (ref 95–98)
SODIUM SERPL-SCNC: 139 MMOL/L (ref 136–145)
WBC NRBC COR # BLD AUTO: 4.13 10*3/MM3 (ref 3.4–10.8)

## 2025-03-25 PROCEDURE — C1894 INTRO/SHEATH, NON-LASER: HCPCS | Performed by: INTERNAL MEDICINE

## 2025-03-25 PROCEDURE — 25010000002 FENTANYL CITRATE (PF) 50 MCG/ML SOLUTION: Performed by: INTERNAL MEDICINE

## 2025-03-25 PROCEDURE — C1769 GUIDE WIRE: HCPCS | Performed by: INTERNAL MEDICINE

## 2025-03-25 PROCEDURE — 82810 BLOOD GASES O2 SAT ONLY: CPT

## 2025-03-25 PROCEDURE — 25010000002 MIDAZOLAM PER 1 MG: Performed by: INTERNAL MEDICINE

## 2025-03-25 PROCEDURE — 80048 BASIC METABOLIC PNL TOTAL CA: CPT

## 2025-03-25 PROCEDURE — 85018 HEMOGLOBIN: CPT

## 2025-03-25 PROCEDURE — 25510000001 IOPAMIDOL PER 1 ML: Performed by: INTERNAL MEDICINE

## 2025-03-25 PROCEDURE — 25810000003 SODIUM CHLORIDE 0.9 % SOLUTION: Performed by: INTERNAL MEDICINE

## 2025-03-25 PROCEDURE — 93456 R HRT CORONARY ARTERY ANGIO: CPT | Performed by: INTERNAL MEDICINE

## 2025-03-25 PROCEDURE — 25010000002 LIDOCAINE 2% SOLUTION: Performed by: INTERNAL MEDICINE

## 2025-03-25 PROCEDURE — 36415 COLL VENOUS BLD VENIPUNCTURE: CPT

## 2025-03-25 PROCEDURE — 85014 HEMATOCRIT: CPT

## 2025-03-25 PROCEDURE — G0378 HOSPITAL OBSERVATION PER HR: HCPCS

## 2025-03-25 PROCEDURE — 85027 COMPLETE CBC AUTOMATED: CPT

## 2025-03-25 PROCEDURE — 25010000002 HEPARIN (PORCINE) PER 1000 UNITS: Performed by: INTERNAL MEDICINE

## 2025-03-25 RX ORDER — MIDAZOLAM HYDROCHLORIDE 1 MG/ML
INJECTION, SOLUTION INTRAMUSCULAR; INTRAVENOUS
Status: DISCONTINUED | OUTPATIENT
Start: 2025-03-25 | End: 2025-03-25 | Stop reason: HOSPADM

## 2025-03-25 RX ORDER — SODIUM CHLORIDE 9 MG/ML
75 INJECTION, SOLUTION INTRAVENOUS CONTINUOUS
Status: ACTIVE | OUTPATIENT
Start: 2025-03-25 | End: 2025-03-25

## 2025-03-25 RX ORDER — HYDROCODONE BITARTRATE AND ACETAMINOPHEN 5; 325 MG/1; MG/1
1 TABLET ORAL EVERY 4 HOURS PRN
Refills: 0 | Status: DISCONTINUED | OUTPATIENT
Start: 2025-03-25 | End: 2025-03-26 | Stop reason: HOSPADM

## 2025-03-25 RX ORDER — ATENOLOL 25 MG/1
100 TABLET ORAL EVERY 12 HOURS SCHEDULED
Status: DISCONTINUED | OUTPATIENT
Start: 2025-03-25 | End: 2025-03-26 | Stop reason: HOSPADM

## 2025-03-25 RX ORDER — BRIMONIDINE TARTRATE 2 MG/ML
1 SOLUTION/ DROPS OPHTHALMIC 2 TIMES DAILY
Status: DISCONTINUED | OUTPATIENT
Start: 2025-03-25 | End: 2025-03-26 | Stop reason: HOSPADM

## 2025-03-25 RX ORDER — FLUTICASONE PROPIONATE 50 MCG
2 SPRAY, SUSPENSION (ML) NASAL DAILY
Status: DISCONTINUED | OUTPATIENT
Start: 2025-03-26 | End: 2025-03-26 | Stop reason: HOSPADM

## 2025-03-25 RX ORDER — LIDOCAINE HYDROCHLORIDE 20 MG/ML
INJECTION, SOLUTION INFILTRATION; PERINEURAL
Status: DISCONTINUED | OUTPATIENT
Start: 2025-03-25 | End: 2025-03-25 | Stop reason: HOSPADM

## 2025-03-25 RX ORDER — IOPAMIDOL 755 MG/ML
INJECTION, SOLUTION INTRAVASCULAR
Status: DISCONTINUED | OUTPATIENT
Start: 2025-03-25 | End: 2025-03-25 | Stop reason: HOSPADM

## 2025-03-25 RX ORDER — FUROSEMIDE 40 MG/1
20 TABLET ORAL DAILY
Status: DISCONTINUED | OUTPATIENT
Start: 2025-03-26 | End: 2025-03-26 | Stop reason: HOSPADM

## 2025-03-25 RX ORDER — VERAPAMIL HYDROCHLORIDE 2.5 MG/ML
INJECTION, SOLUTION INTRAVENOUS
Status: DISCONTINUED | OUTPATIENT
Start: 2025-03-25 | End: 2025-03-25 | Stop reason: HOSPADM

## 2025-03-25 RX ORDER — NITROGLYCERIN 0.4 MG/1
0.4 TABLET SUBLINGUAL
Status: DISCONTINUED | OUTPATIENT
Start: 2025-03-25 | End: 2025-03-26 | Stop reason: HOSPADM

## 2025-03-25 RX ORDER — SODIUM CHLORIDE 0.9 % (FLUSH) 0.9 %
10 SYRINGE (ML) INJECTION AS NEEDED
Status: DISCONTINUED | OUTPATIENT
Start: 2025-03-25 | End: 2025-03-25 | Stop reason: HOSPADM

## 2025-03-25 RX ORDER — ASPIRIN 81 MG/1
81 TABLET ORAL DAILY
Status: DISCONTINUED | OUTPATIENT
Start: 2025-03-26 | End: 2025-03-26 | Stop reason: HOSPADM

## 2025-03-25 RX ORDER — SODIUM CHLORIDE 0.9 % (FLUSH) 0.9 %
10 SYRINGE (ML) INJECTION EVERY 12 HOURS SCHEDULED
Status: DISCONTINUED | OUTPATIENT
Start: 2025-03-25 | End: 2025-03-25 | Stop reason: HOSPADM

## 2025-03-25 RX ORDER — CARBAMAZEPINE 100 MG/1
100 TABLET, EXTENDED RELEASE ORAL 2 TIMES DAILY
Status: DISCONTINUED | OUTPATIENT
Start: 2025-03-25 | End: 2025-03-26 | Stop reason: HOSPADM

## 2025-03-25 RX ORDER — TERAZOSIN 5 MG/1
5 CAPSULE ORAL NIGHTLY
Status: DISCONTINUED | OUTPATIENT
Start: 2025-03-25 | End: 2025-03-26 | Stop reason: HOSPADM

## 2025-03-25 RX ORDER — MULTIPLE VITAMINS W/ MINERALS TAB 9MG-400MCG
1 TAB ORAL DAILY
Status: DISCONTINUED | OUTPATIENT
Start: 2025-03-26 | End: 2025-03-26 | Stop reason: HOSPADM

## 2025-03-25 RX ORDER — HEPARIN SODIUM 1000 [USP'U]/ML
INJECTION, SOLUTION INTRAVENOUS; SUBCUTANEOUS
Status: DISCONTINUED | OUTPATIENT
Start: 2025-03-25 | End: 2025-03-25 | Stop reason: HOSPADM

## 2025-03-25 RX ORDER — ONDANSETRON 4 MG/1
4 TABLET, ORALLY DISINTEGRATING ORAL EVERY 6 HOURS PRN
Status: DISCONTINUED | OUTPATIENT
Start: 2025-03-25 | End: 2025-03-26 | Stop reason: HOSPADM

## 2025-03-25 RX ORDER — LEVOTHYROXINE SODIUM 50 UG/1
50 TABLET ORAL
Status: DISCONTINUED | OUTPATIENT
Start: 2025-03-26 | End: 2025-03-26 | Stop reason: HOSPADM

## 2025-03-25 RX ORDER — FENTANYL CITRATE 50 UG/ML
INJECTION, SOLUTION INTRAMUSCULAR; INTRAVENOUS
Status: DISCONTINUED | OUTPATIENT
Start: 2025-03-25 | End: 2025-03-25 | Stop reason: HOSPADM

## 2025-03-25 RX ORDER — SPIRONOLACTONE 25 MG/1
25 TABLET ORAL DAILY
Status: DISCONTINUED | OUTPATIENT
Start: 2025-03-26 | End: 2025-03-26 | Stop reason: HOSPADM

## 2025-03-25 RX ORDER — ACETAMINOPHEN 325 MG/1
650 TABLET ORAL EVERY 4 HOURS PRN
Status: DISCONTINUED | OUTPATIENT
Start: 2025-03-25 | End: 2025-03-26 | Stop reason: HOSPADM

## 2025-03-25 RX ORDER — SODIUM CHLORIDE 9 MG/ML
50 INJECTION, SOLUTION INTRAVENOUS CONTINUOUS
Status: ACTIVE | OUTPATIENT
Start: 2025-03-25 | End: 2025-03-25

## 2025-03-25 RX ORDER — ONDANSETRON 2 MG/ML
4 INJECTION INTRAMUSCULAR; INTRAVENOUS EVERY 6 HOURS PRN
Status: DISCONTINUED | OUTPATIENT
Start: 2025-03-25 | End: 2025-03-26 | Stop reason: HOSPADM

## 2025-03-25 RX ADMIN — TERAZOSIN 5 MG: 5 CAPSULE ORAL at 22:03

## 2025-03-25 RX ADMIN — BRIMONIDINE TARTRATE 1 DROP: 2 SOLUTION/ DROPS OPHTHALMIC at 22:04

## 2025-03-25 RX ADMIN — ATENOLOL 100 MG: 25 TABLET ORAL at 22:03

## 2025-03-25 RX ADMIN — SODIUM CHLORIDE 75 ML/HR: 9 INJECTION, SOLUTION INTRAVENOUS at 10:22

## 2025-03-25 RX ADMIN — ACETAMINOPHEN 650 MG: 325 TABLET, FILM COATED ORAL at 22:03

## 2025-03-25 RX ADMIN — CARBAMAZEPINE 100 MG: 100 TABLET, EXTENDED RELEASE ORAL at 22:03

## 2025-03-25 NOTE — Clinical Note
Hemostasis started on the right radial artery. R-Band was used in achieving hemostasis. Radial compression device applied to vessel. Hemostasis achieved successfully. Closure device additional comment: YENNY redding 14cc

## 2025-03-25 NOTE — Clinical Note
The DP pulses are +1 bilaterally. The PT pulses are +1 bilaterally. The left radial pulse is +1. The left ulnar pulse is +1. The femoral pulses are +1 bilaterally.

## 2025-03-25 NOTE — PLAN OF CARE
Goal Outcome Evaluation:  Plan of Care Reviewed With: patient        Progress: improving  Outcome Evaluation: Pt is AOx4. Afib on tele, HR 70-80s. /92. On room air. Pt has bruising on L radial cath site, drsg is CDI, site is soft. L braachial site is CDI and soft. Pt has no complaints of pain. Pt has no further needs at this time.

## 2025-03-25 NOTE — Clinical Note
Hemostasis started on the left brachial vein. Manual pressure applied to vessel. Manual pressure was held by AR, RTR. Manual pressure was held for 5 min. Hemostasis achieved successfully. Closure device additional comment: 4x4, tegaderm

## 2025-03-25 NOTE — DISCHARGE INSTRUCTIONS
[facility]  [address] [place] [address]    Coronary Angiogram (Radial/Ulnar Approach) After Care    Refer to this sheet in the next few weeks. These instructions provide you with information on caring for yourself after your procedure. Your caregiver may also give you more specific instructions. Your treatment has been planned according to current medical practices, but problems sometimes occur. Call your caregiver if you have any problems or questions after your procedure.    Home Care Instructions:  You may shower the day after the procedure. Remove the bandage (dressing) and gently wash the site with plain soap and water. Gently pat the site dry. You may apply a band aid daily for 2 days if desired.    Do not apply powder or lotion to the site.  Do not submerge the affected site in water for 3 to 5 days or until the site is completely healed.   Do not lift, push or pull anything over 5 pounds for 5 days after your procedure or as directed by your physician.  As a reference, a gallon of milk weighs 8 pounds.   Inspect the site at least twice daily. You may notice some bruising at the site and it may be tender for 1 to 2 weeks.     Increase your fluid intake for the next 2 days.    Keep arm elevated for 24 hours. For the remainder of the day, keep your arm in “Pledge of Allegiance” position when up and about.     You may drive 24 hours after the procedure unless otherwise instructed by your caregiver.  Do not operate machinery or power tools for 24 hours.  A responsible adult should be with you for the first 24 hours after you arrive home. Do not make any important legal decisions or sign legal papers for 24 hours.  Do not drink alcohol for 24 hours.    Metformin or any medications containing Metformin should not be taken for 48 hours after your procedure.      Call Your Doctor if:   You have unusual pain at the radial/ulnar (wrist) site.  You have redness, warmth, swelling, or pain at the  radial/ulnar (wrist) site.  You have drainage (other than a small amount of blood on the dressing).  `You have chills or a fever > 101.  Your arm becomes pale or dark, cool, tingly, or numb.  You develop chest pain, shortness of breath, feel faint or pass out.    You have heavy bleeding from the site, hold pressure on the site for 20 minutes.  If the bleeding stops, apply a fresh bandage and call your cardiologist.  However, if you        continue to have bleeding, call 911 and continue to apply pressure to the site.   You have any symptoms of a stroke.  Remember BE FAST  B-balance. Sudden trouble walking or loss of balance.  E-eyes.  Sudden changes in how you see or a sudden onset of a very bad headache.   F-face. Sudden weakness or loss of feeling of the face or facial droop on one side.   A-arms Sudden weakness or numbness in one arm.  One arm drifts down if they are both held out in front of you. This happens suddenly and usually on one side of the body.   S-speech.  Sudden trouble speaking, slurred speech or trouble understanding what are saying.   T-time  Time to call emergency services.  Write down the symptoms and the time they started.     [facility]  [address] [place] [address]    Right Heart Cath After Care    Refer to this sheet in the next few weeks. These instructions provide you with information on caring for yourself after your procedure. Your caregiver may also give you more specific instructions. Your treatment has been planned according to current medical practices, but problems sometimes occur. Call your caregiver if you have any problems or questions after your procedure.    Home Care Instructions:  You may shower the day after the procedure. Remove the bandage (dressing) and gently wash the site with plain soap and water. Gently pat the site dry. You may apply a band aid daily for 2 days if desired.    Do not apply powder or lotion to the site until the site is  completely healed.  Do not submerge the affected site in water until the site is completely healed.   No heavy lifting today.   Inspect the site at least twice daily. You may notice some bruising at the site..     If you received sedation a responsible adult should be with you for the first 24 hours after you arrive home. Do not make any important legal decisions or sign legal papers for 24 hours.  Do not drink alcohol for 24 hours.  Do not operate machinery or power tools for 24 hours. You may drive 24 hours after the procedure unless otherwise instructed by your caregiver.        Call Your Doctor if:   You have unusual pain at the puncture site.  You have redness, warmth, swelling, or pain at the puncture site.  You have drainage (other than a small amount of blood on the dressing).  `You have chills or a fever > 101.  Your arm becomes pale or dark, cool, tingly, or numb.  You develop chest pain, shortness of breath, feel faint or pass out.    You have heavy bleeding from the site, hold pressure on the site for 20 minutes.  If the bleeding stops, apply a fresh bandage and call your cardiologist.  However, if you        continue to have bleeding, call 911 and continue to apply pressure to the site.   You have any symptoms of a stroke.  Remember BE FAST  B-balance. Sudden trouble walking or loss of balance.  E-eyes.  Sudden changes in how you see or a sudden onset of a very bad headache.   F-face. Sudden weakness or loss of feeling of the face or facial droop on one side.   A-arms Sudden weakness or numbness in one arm.  One arm drifts down if they are both held out in front of you. This happens suddenly and usually on one side of the body.   S-speech.  Sudden trouble speaking, slurred speech or trouble understanding what are saying.   T-time  Time to call emergency services.  Write down the symptoms and the time they started.

## 2025-03-25 NOTE — Clinical Note
Prepped: groin, left brachial and Left Wrist. Prepped with: ChloraPrep. The site was clipped. The patient was draped in a sterile fashion.

## 2025-03-26 ENCOUNTER — TRANSCRIBE ORDERS (OUTPATIENT)
Dept: HOME HEALTH SERVICES | Facility: HOME HEALTHCARE | Age: OVER 89
End: 2025-03-26
Payer: MEDICARE

## 2025-03-26 VITALS
SYSTOLIC BLOOD PRESSURE: 161 MMHG | HEART RATE: 83 BPM | WEIGHT: 91.5 LBS | DIASTOLIC BLOOD PRESSURE: 90 MMHG | RESPIRATION RATE: 18 BRPM | TEMPERATURE: 98.2 F | OXYGEN SATURATION: 94 % | HEIGHT: 60 IN | BODY MASS INDEX: 17.96 KG/M2

## 2025-03-26 DIAGNOSIS — I34.0 MITRAL VALVE INSUFFICIENCY, UNSPECIFIED ETIOLOGY: Primary | ICD-10-CM

## 2025-03-26 PROBLEM — R06.09 DYSPNEA ON EXERTION: Status: ACTIVE | Noted: 2025-03-26

## 2025-03-26 LAB
ANION GAP SERPL CALCULATED.3IONS-SCNC: 8.3 MMOL/L (ref 5–15)
BUN SERPL-MCNC: 33 MG/DL (ref 8–23)
BUN/CREAT SERPL: 23.9 (ref 7–25)
CALCIUM SPEC-SCNC: 9 MG/DL (ref 8.2–9.6)
CHLORIDE SERPL-SCNC: 102 MMOL/L (ref 98–107)
CO2 SERPL-SCNC: 23.7 MMOL/L (ref 22–29)
CREAT SERPL-MCNC: 1.38 MG/DL (ref 0.57–1)
DEPRECATED RDW RBC AUTO: 44.2 FL (ref 37–54)
EGFRCR SERPLBLD CKD-EPI 2021: 36 ML/MIN/1.73
ERYTHROCYTE [DISTWIDTH] IN BLOOD BY AUTOMATED COUNT: 12.8 % (ref 12.3–15.4)
GLUCOSE SERPL-MCNC: 82 MG/DL (ref 65–99)
HCT VFR BLD AUTO: 34.3 % (ref 34–46.6)
HGB BLD-MCNC: 11 G/DL (ref 12–15.9)
MAGNESIUM SERPL-MCNC: 2.3 MG/DL (ref 1.7–2.3)
MCH RBC QN AUTO: 30.4 PG (ref 26.6–33)
MCHC RBC AUTO-ENTMCNC: 32.1 G/DL (ref 31.5–35.7)
MCV RBC AUTO: 94.8 FL (ref 79–97)
PLATELET # BLD AUTO: 102 10*3/MM3 (ref 140–450)
PMV BLD AUTO: 9.8 FL (ref 6–12)
POTASSIUM SERPL-SCNC: 4.4 MMOL/L (ref 3.5–5.2)
RBC # BLD AUTO: 3.62 10*6/MM3 (ref 3.77–5.28)
SODIUM SERPL-SCNC: 134 MMOL/L (ref 136–145)
WBC NRBC COR # BLD AUTO: 5.08 10*3/MM3 (ref 3.4–10.8)

## 2025-03-26 PROCEDURE — 85027 COMPLETE CBC AUTOMATED: CPT | Performed by: NURSE PRACTITIONER

## 2025-03-26 PROCEDURE — G0378 HOSPITAL OBSERVATION PER HR: HCPCS

## 2025-03-26 PROCEDURE — 80048 BASIC METABOLIC PNL TOTAL CA: CPT | Performed by: NURSE PRACTITIONER

## 2025-03-26 PROCEDURE — 99239 HOSP IP/OBS DSCHRG MGMT >30: CPT | Performed by: INTERNAL MEDICINE

## 2025-03-26 PROCEDURE — 83735 ASSAY OF MAGNESIUM: CPT | Performed by: NURSE PRACTITIONER

## 2025-03-26 RX ADMIN — CARBAMAZEPINE 100 MG: 100 TABLET, EXTENDED RELEASE ORAL at 08:42

## 2025-03-26 RX ADMIN — ATENOLOL 100 MG: 25 TABLET ORAL at 08:42

## 2025-03-26 RX ADMIN — LEVOTHYROXINE SODIUM 50 MCG: 50 TABLET ORAL at 05:13

## 2025-03-26 RX ADMIN — FLUTICASONE PROPIONATE 2 SPRAY: 50 SPRAY, METERED NASAL at 08:50

## 2025-03-26 RX ADMIN — BRIMONIDINE TARTRATE 1 DROP: 2 SOLUTION/ DROPS OPHTHALMIC at 08:44

## 2025-03-26 RX ADMIN — MAGNESIUM OXIDE TAB 400 MG (240 MG ELEMENTAL MG) 400 MG: 400 (240 MG) TAB at 03:46

## 2025-03-26 RX ADMIN — FUROSEMIDE 20 MG: 40 TABLET ORAL at 08:41

## 2025-03-26 RX ADMIN — SPIRONOLACTONE 25 MG: 25 TABLET ORAL at 08:43

## 2025-03-26 RX ADMIN — Medication 1 TABLET: at 08:43

## 2025-03-26 RX ADMIN — ACETAMINOPHEN 650 MG: 325 TABLET, FILM COATED ORAL at 03:46

## 2025-03-26 NOTE — DISCHARGE SUMMARY
Date of Discharge:  3/26/2025  Date of Admit: 3/25/2025    Discharge Diagnosis:  1.  Dyspnea on exertion  2.  Severe mitral and tricuspid valve regurgitation  3.  Chronic atrial fibrillation  4.  Moderate pulmonary hypertension  5.  Chronic atrial fibrillation      Hospital Course:   89-year-old female with a medical history of now chronic atrial fibrillation, colon cancer, frailty, essential hypertension, who presents to me secondary to dyspnea on exertion.     She underwent recent transthoracic echocardiogram with severe mitral and tricuspid valve regurgitation.  She had an elevated RVSP on the echo greater than 55 mmHg.  She underwent coronary angiography along with right heart catheterization.  The PA pressure was 53/23/37.  The wedge was mildly elevated, creatinine is slightly up and we did not increase her diuretic therapy.  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  She has done well overnight.  We did discuss MitraClip, however, she does not down the path of additional cardiac procedures    Procedures Performed  Procedure(s):  Right and Left Heart Cath  Coronary angiography     Conclusions:   1. Left main: Normal  2. LAD: Diffuse 20 to 30% proximal stenosis.  Discrete 30 to 40% mid vessel stenosis.  Mid septal  branch with discrete 99% stenosis  3. LCX: Diffuse 20 to 30% mid vessel stenosis  4. RCA: Luminal irregularities proximal segment.  5.  Mildly elevated right atrial and wedge pressure.  Large V wave on wedge pressure.  6.  Moderate pulmonary hypertension    Consults       No orders found for last 30 day(s).              Discharge Physical Exam:  Temp:  [97.2 °F (36.2 °C)-98.2 °F (36.8 °C)] 98.2 °F (36.8 °C)  Heart Rate:  [65-87] 83  Resp:  [12-23] 18  BP: (130-187)/() 161/90    Intake/Output Summary (Last 24 hours) at 3/26/2025 1121  Last data filed at 3/26/2025  "0936  Gross per 24 hour   Intake 600 ml   Output 1000 ml   Net -400 ml     Flowsheet Rows      Flowsheet Row First Filed Value   Admission Height 152.4 cm (60\") Documented at 03/25/2025 1013   Admission Weight 41.5 kg (91 lb 8 oz) Documented at 03/25/2025 1013            General Appearance:    Alert, cooperative, in no acute distress   Head:    Normocephalic, without obvious abnormality, atraumatic       Neck/Lymph   No adenopathy, supple, no thyromegaly, no carotid bruit, no    JVD   Lungs:     Clear to auscultation bilaterally, no wheezes, rales, or     rhonchi    Cardiac:    Normal rate, regular rhythm, no murmur, no rub, no gallop   Chest Wall:    No abnormalities observed   GI:     Normal bowel sounds, soft, nontender, nondistended,            no rebound tenderness   Extremities:   No cyanosis, clubbing, or edema   Circulatory/Peripheral Vascular :   Pulses palpable and equal bilaterally   Integumentary:   No bleeding or rash. Normal temperature.  Ecchymosis risk.  No hematoma.                  Discharge Medications     Discharge Medications        Continue These Medications        Instructions Start Date   acetaminophen 650 MG 8 hr tablet  Commonly known as: TYLENOL   650 mg, Every 8 Hours PRN      aspirin 81 MG EC tablet   81 mg, Daily      atenolol 100 MG tablet  Commonly known as: TENORMIN   Take 1 tablet (100 mg total) by mouth 2 (Two) Times a Day      brimonidine 0.2 % ophthalmic solution  Commonly known as: ALPHAGAN   1 drop, 2 Times Daily      calcium carbonate-vitamin d 600-400 MG-UNIT per tablet   1 tablet, Daily      carBAMazepine  MG 12 hr tablet  Commonly known as: TEGretol  XR   100 mg, Oral, 2 Times Daily      doxazosin 4 MG tablet  Commonly known as: CARDURA   Take 1 tablet by mouth every night at bedtime.      fluticasone 50 MCG/ACT nasal spray  Commonly known as: FLONASE   2 sprays, Daily      furosemide 20 MG tablet  Commonly known as: LASIX   Take 1 tablet by mouth daily.    "   multivitamin with minerals tablet tablet   Take  by mouth.      spironolactone 25 MG tablet  Commonly known as: ALDACTONE   1 tablet, Daily      Synthroid 50 MCG tablet  Generic drug: levothyroxine   50 mcg, Every Early Morning               Discharge Diet: Cardiac, carbohydrate controlled.    Activity at Discharge: No lifting greater than 10 pounds right arm 1 week    Discharge disposition: home    Condition on Discharge: stable    Follow-up Appointments  No future appointments.      Test Results Pending at Discharge       Mukul Bloom MD  03/26/25  11:20 EDT    Greater than 30 min spent in reviewing records, discussion and examination of the patient and discussion with other members of the patient's medical team.     Dictated utilizing Dragon dictation

## 2025-03-26 NOTE — TREATMENT PLAN
Cardiology was paged to reconcile home medications.  Home meds resumed.  Blood pressure and heart rate are stable right now.

## 2025-03-26 NOTE — CASE MANAGEMENT/SOCIAL WORK
Case Management Discharge Note      Final Note: Pt discharged home, 3/26/25.  [facility] will do start of care on 3/29/25.…....Salma S/RN CM         Selected Continued Care - Discharged on 3/26/2025 Admission date: 3/25/2025 - Discharge disposition: Home or Self Care      Destination    No services have been selected for the patient.                Durable Medical Equipment    No services have been selected for the patient.                Dialysis/Infusion    No services have been selected for the patient.                Home Medical Care Coordination complete.      Service Provider Services Address Phone Fax Patient Preferred    [facility] [address] [address] [phone] --              Therapy    No services have been selected for the patient.                Community Resources    No services have been selected for the patient.                Community & DME    No services have been selected for the patient.                    Transportation Services  Private: Car    Final Discharge Disposition Code: 01 - home or self-care

## 2025-03-26 NOTE — PROGRESS NOTES
Start PACC Note    Home Health Referral    Evaluated patient on Home Care and services available. Patient offered choice of available HHC and agreeable to SN/PT services with [facility].    Care Types:   Isolation Precautions:     Social Determinants of Health:  Tobacco Use: Low Risk  (3/25/2025)    Patient History     Smoking Tobacco Use: Never     Smokeless Tobacco Use: Never     Passive Exposure: Not on file     Social History     Substance and Sexual Activity   Alcohol Use No     Social History     Substance and Sexual Activity   Drug Use No       Does the patient have any financial resource strain?   Does the patient have any food insecurities?   Does the patient have any housing instabilities?     If any of the above is noted as yes - consider a MSW evaluation once the patient returns home.    START PATIENT REGISTRATION INFORMATION  Order Information  Order Signing Physician: No att. providers found  Service Ordered RN?: Yes  Service Ordered PT?: Yes  Service Ordered OT?: No  Service Ordered ST?: No  Service Ordered MSW?: No  Service Ordered HHA?: No  Following Physician: Dr. Mukul Bloom  Following Physician Phone: [phone]  Overseeing Physician: Dr. Mukul Bloom  (Required for Residents Only)  Agreeable to Follow? Yes  Date/Time of Call 03/26/25 15:33 EDT, Spoke with: Dr. Bloom    Care Coordination  Same Day SOC?: No  Primary Care Physician: Angelica Anthony MD  Primary Care Physician Phone: [phone]  Primary Care Physician Address: [address] [place], [place] [address]  Visit Instructions: START OF CARE 3/29/25 PER PATIENT REQUEST  Service Discharge Location Type: Home  Service Facility Name:   Service Floor Facility:   Service Room No:     Demographics  Patient Last Name: Indio  Patient First Name: Inge  Language/Communication Barrier: NO  Service Address: [address] [place] [address]   Service City: [place]  Service State: [place]  Service Zip: [address]  Service Home Phone:  [phone]  Other Phone Numbers:   No relevant phone numbers on file.     Emergency Contact:   Extended Emergency Contact Information  Primary Emergency Contact: Burt Borjas  Address: [address] [facility]            [place], [address]  Home Phone: [phone]  Work Phone: [phone]  Mobile Phone: [phone]  Relation: Spouse  Hearing or visual needs: None  Other needs: None  Preferred language: English   needed? No  Secondary Emergency Contact: Precious Paula  Mobile Phone: [phone]  Relation: Friend  Preferred language: English   needed? No    Admission Information  Admit Date: 03/25/2025  Patient Status at Discharge:   Admitting Diagnosis: MITRAL AND TRICUSPID VALVE REGURGITATION  Caregiver Information  Caregiver First Name:   Caregiver Last Name:   Caregiver Relationship to Patient:   Caregiver Phone Number:   Caregiver Notes:     HITE[employer]  [employer]  No      END PATIENT REGISTRATION INFORMATION    Start PACC Summary    Additional Comments: PATIENT REQUESTS SOC 3/29/2025    END PACC Summary    Discharge Date: 03/26/2025    Referral Source:  Sánchez    Signed By: Rehana Penaloza RN, 3/26/2025, 15:33 EDT     Date/Time: 03/26/25 15:33 EDT    End PACC Note

## 2025-03-26 NOTE — CASE MANAGEMENT/SOCIAL WORK
Discharge Planning Assessment  [facility]     Patient Name: Inge Borjas  MRN: [id number]  Today's Date: 3/26/2025    Admit Date: 3/25/2025    Plan: Home w/ [facility] (Start of care on Saturday 3/29)   Discharge Needs Assessment       Row Name 03/26/25 1309       Living Environment    People in Home spouse    Name(s) of People in Home Burt Borjas/spouse    Unique Family Situation Spouse is currently a patient here in hospital on 6N    Current Living Arrangements home    Potentially Unsafe Housing Conditions none    In the past 12 months has the electric, gas, oil, or water company threatened to shut off services in your home? No    Primary Care Provided by self    Provides Primary Care For no one    Family Caregiver if Needed friend(s);spouse    Family Caregiver Names Neighbors/Bright and Nay; Spouse/Burt Borjas    Quality of Family Relationships helpful;involved;supportive    Able to Return to Prior Arrangements yes       Resource/Environmental Concerns    Resource/Environmental Concerns none    Transportation Concerns none       Transportation Needs    In the past 12 months, has lack of transportation kept you from medical appointments or from getting medications? no    In the past 12 months, has lack of transportation kept you from meetings, work, or from getting things needed for daily living? No       Food Insecurity    Within the past 12 months, you worried that your food would run out before you got the money to buy more. Never true       Transition Planning    Patient/Family Anticipates Transition to home    Patient/Family Anticipated Services at Transition home health care    Transportation Anticipated family or friend will provide       Discharge Needs Assessment    Readmission Within the Last 30 Days no previous admission in last 30 days    Equipment Currently Used at Home bp cuff;scales;grab bar;shower chair;power chair,(recliner lift)    Concerns to be Addressed discharge planning    Do you  "want help finding or keeping work or a job? I do not need or want help    Do you want help with school or training? For example, starting or completing job training or getting a high school diploma, GED or equivalent No    Anticipated Changes Related to Illness none    Equipment Needed After Discharge none    Discharge Facility/Level of Care Needs home with home health    Offered/Gave Vendor List yes    Patient's Choice of Community Agency(s) Chose [facility]                   Discharge Plan       Row Name 03/26/25 1326       Plan    Plan Home w/ [facility] (Start of care on Saturday 3/29)    Plan Comments CCP spoke with patient at bedside. Introduced self and role.  Discharge planning discussed.  Information on face sheet verified.  Patient stated she is IADL's, and she nor  drive anymore.  Patient lives with her spouse/Burt Borjas in a single-story home with a basement (handrail present on one side) and has four entrance stair steps.  Pt reports she does not use any assistive ambulatory devices.  Pt has BP cuff, scale, grab bars and shower bench at home.  Pt does her own medications.  Pt laundry is in the basement and she and spouse share the laundry responsibility.  Pt reports they are both in their 90's and rely on friends and neighbors for transportation and shopping.  Pt reports she has a \"good Yarsani family\" and neighbors across the street Bright and Nay are very helpful if ever needed.  Patients PCP confirmed as, Angelica Anthony and home pharmacy is [facility].  Pt friend Precious Paula is her emergency contact if spouse unavailable (he is currently hospitalized and on 6N).  Patient plans to discharge home and is agreeable to having [facility] follow her.  Rehana/[facility] has accepted, and they will follow.  OF Note:  Patient does not want them to do her start of care until Saturday, 3/29.  CCP relayed this to STEVEN Aiken RN.  Patient friend will be transporting her home at discharge……STEFANI " will continue to follow….…Salma RAYMOND /STEFANI.      Row Name 03/26/25 1135       Plan    Plan Home w/ [facility]                   Continued Care and Services - Discharged on 3/26/2025 Admission date: 3/25/2025 - Discharge disposition: Home or Self Care      Home Medical Care Coordination complete.      Service Provider Request Status Services Address Phone Fax Patient Preferred    [facility] [address] [address] [phone] --                  Expected Discharge Date and Time       Expected Discharge Date Expected Discharge Time    Mar 26, 2025            Demographic Summary       Row Name 03/26/25 1308       General Information    Admission Type observation    Arrived From PACU/recovery room  cath lab    Referral Source admission list    Reason for Consult discharge planning    Preferred Language English       Contact Information    Permission Granted to Share Info With                    Functional Status       Row Name 03/26/25 1308       Functional Status    Usual Activity Tolerance moderate    Current Activity Tolerance moderate       Physical Activity    On average, how many days per week do you engage in moderate to strenuous exercise (like a brisk walk)? 0 days    On average, how many minutes do you engage in exercise at this level? 0 min    Number of minutes of exercise per week 0       Assessment of Health Literacy    How often do you have someone help you read hospital materials? Occasionally    How often do you have problems learning about your medical condition because of difficulty understanding written information? Occasionally    How often do you have a problem understanding what is told to you about your medical condition? Occasionally    How confident are you filling out medical forms by yourself? Quite a bit    Health Literacy Good       Functional Status, IADL    Medications independent    Meal  Preparation independent    Housekeeping assistive person    Laundry assistive person    Shopping completely dependent    IADL Comments She nor spouse drive.  Friends provide transportation to and from appointments, shopping and Buddhism.       Mental Status    General Appearance WDL WDL       Mental Status Summary    Recent Changes in Mental Status/Cognitive Functioning no changes       Employment/    Employment Status retired                   Psychosocial    No documentation.                  Abuse/Neglect    No documentation.                  Legal    No documentation.                  Substance Abuse    No documentation.                  Patient Forms    No documentation.                     Salma Shin RN

## 2025-03-26 NOTE — NURSING NOTE
Pt is AO x 4. All vs stable. Pt s/p cath' radial radial site s/d/I with bruising. Pt complaints pf left leg cramps. Ambulated in room, SCD's in place and tylenol given.  Pt Slept for a few hours but woke up with cramps again. Huseyin Shen Cardiology APRN notified. See orders. WCTM

## 2025-03-26 NOTE — DISCHARGE PLACEMENT REQUEST
"Inge Borjas (89 y.o. Female)       Date of Birth   08/06/1932    Social Security Number       Address   [address] [place] [address]    Home Phone   [phone]    MRN   [id number]       [facility]    Marital Status                               Admission Date   3/25/2025    Admission Type   Elective    Admitting Provider   Mukul Bloom MD    Attending Provider   Mukul Bloom MD    Department, Room/Bed   [facility] CARDIOVASC UNIT, 2204/1       Discharge Date       Discharge Disposition   Home or Self Care    Discharge Destination                                 Attending Provider: Mukul Bloom MD    Allergies: Cardizem [Diltiazem Hcl], Amoxicillin    Isolation: None   Infection: None   Code Status: Not on file    Ht: 152.4 cm (60\")   Wt: 41.5 kg (91 lb 8 oz)    Admission Cmt: None   Principal Problem: None                  Active Insurance as of 3/25/2025       Primary Coverage       Payor Plan Insurance Group Employer/Plan Group    MEDICARE MEDICARE A & B        Payor Plan Address Payor Plan Phone Number Payor Plan Fax Number Effective Dates    [address] [phone]  10/1/1999 - None Entered    [facility] [id number]         Subscriber Name Subscriber Birth Date Member ID       INGE BORJAS 8/6/1932 [id number]               Secondary Coverage       Payor Plan Insurance Group Employer/Plan Group    PH[employer]ICIANS [place] [employer] F       Payor Plan Address Payor Plan Phone Number Payor Plan Fax Number Effective Dates    ATTN CLAIMS DEPT [phone]  6/1/2002 - None Entered    [address] [place] [id number]         Subscriber Name Subscriber Birth Date Member ID       INGE BORJAS 8/6/1932 [id number]                     Emergency Contacts        (Rel.) Home Phone Work Phone Mobile Phone    Burt Borjas (Spouse) [phone]    Precious Paula (Friend) -- -- [phone]      "

## 2025-04-14 ENCOUNTER — TRANSCRIBE ORDERS (OUTPATIENT)
Dept: ADMINISTRATIVE | Facility: HOSPITAL | Age: OVER 89
End: 2025-04-14
Payer: MEDICARE

## 2025-04-14 ENCOUNTER — LAB (OUTPATIENT)
Dept: LAB | Facility: HOSPITAL | Age: OVER 89
End: 2025-04-14
Payer: MEDICARE

## 2025-04-14 DIAGNOSIS — I51.9 MYXEDEMA HEART DISEASE: ICD-10-CM

## 2025-04-14 DIAGNOSIS — E03.9 MYXEDEMA HEART DISEASE: ICD-10-CM

## 2025-04-14 DIAGNOSIS — R06.09 DYSPNEA ON EXERTION: ICD-10-CM

## 2025-04-14 DIAGNOSIS — E03.9 MYXEDEMA HEART DISEASE: Primary | ICD-10-CM

## 2025-04-14 DIAGNOSIS — I51.9 MYXEDEMA HEART DISEASE: Primary | ICD-10-CM

## 2025-04-14 LAB
ANION GAP SERPL CALCULATED.3IONS-SCNC: 12 MMOL/L (ref 5–15)
BUN SERPL-MCNC: 40 MG/DL (ref 8–23)
BUN/CREAT SERPL: 27.6 (ref 7–25)
CALCIUM SPEC-SCNC: 9.3 MG/DL (ref 8.2–9.6)
CHLORIDE SERPL-SCNC: 100 MMOL/L (ref 98–107)
CO2 SERPL-SCNC: 25 MMOL/L (ref 22–29)
CREAT SERPL-MCNC: 1.45 MG/DL (ref 0.57–1)
EGFRCR SERPLBLD CKD-EPI 2021: 33.9 ML/MIN/1.73
GLUCOSE SERPL-MCNC: 111 MG/DL (ref 65–99)
POTASSIUM SERPL-SCNC: 5 MMOL/L (ref 3.5–5.2)
SODIUM SERPL-SCNC: 137 MMOL/L (ref 136–145)
T4 FREE SERPL-MCNC: 1.13 NG/DL (ref 0.92–1.68)
TSH SERPL DL<=0.05 MIU/L-ACNC: 8.44 UIU/ML (ref 0.27–4.2)

## 2025-04-14 PROCEDURE — 84439 ASSAY OF FREE THYROXINE: CPT

## 2025-04-14 PROCEDURE — 80048 BASIC METABOLIC PNL TOTAL CA: CPT

## 2025-04-14 PROCEDURE — 36415 COLL VENOUS BLD VENIPUNCTURE: CPT

## 2025-04-14 PROCEDURE — 84443 ASSAY THYROID STIM HORMONE: CPT

## 2025-04-30 ENCOUNTER — TELEPHONE (OUTPATIENT)
Age: OVER 89
End: 2025-04-30
Payer: MEDICARE

## 2025-04-30 NOTE — TELEPHONE ENCOUNTER
Florence called from [facility]. Patient is not doing any better from hospital discharge 03/25/25. She is always SOB and is currently in AFIB. Her O2 stats-95.     She is scheduled to see Nataliia 05/12/25. Should we try and move her apt up?      Florence from [facility] can be reached at [phone].

## 2025-05-06 ENCOUNTER — TELEPHONE (OUTPATIENT)
Dept: CARDIOLOGY | Facility: CLINIC | Age: OVER 89
End: 2025-05-06
Payer: MEDICARE

## 2025-05-06 NOTE — TELEPHONE ENCOUNTER
Florence [phone] w/ [facility] needs verbal order for Home Health Skilled Nursing Visits with the patient for 1 visit a week for the next 3 weeks. She is experiencing SOA.

## 2025-05-12 ENCOUNTER — OFFICE VISIT (OUTPATIENT)
Dept: CARDIOLOGY | Age: OVER 89
End: 2025-05-12
Payer: MEDICARE

## 2025-05-12 VITALS
BODY MASS INDEX: 17.87 KG/M2 | SYSTOLIC BLOOD PRESSURE: 124 MMHG | DIASTOLIC BLOOD PRESSURE: 78 MMHG | HEART RATE: 84 BPM | HEIGHT: 60 IN | WEIGHT: 91 LBS

## 2025-05-12 DIAGNOSIS — I36.1 NONRHEUMATIC TRICUSPID VALVE REGURGITATION: ICD-10-CM

## 2025-05-12 DIAGNOSIS — R06.09 DYSPNEA ON EXERTION: Primary | ICD-10-CM

## 2025-05-12 DIAGNOSIS — I34.0 NONRHEUMATIC MITRAL VALVE REGURGITATION: ICD-10-CM

## 2025-05-12 PROCEDURE — 93000 ELECTROCARDIOGRAM COMPLETE: CPT | Performed by: NURSE PRACTITIONER

## 2025-05-12 PROCEDURE — 99214 OFFICE O/P EST MOD 30 MIN: CPT | Performed by: NURSE PRACTITIONER

## 2025-05-12 PROCEDURE — 1159F MED LIST DOCD IN RCRD: CPT | Performed by: NURSE PRACTITIONER

## 2025-05-12 PROCEDURE — 1160F RVW MEDS BY RX/DR IN RCRD: CPT | Performed by: NURSE PRACTITIONER

## 2025-05-12 NOTE — PROGRESS NOTES
Subjective:     Encounter Date:05/12/2025      Patient ID: Inge Borjas is a 89 y.o. female.    Chief Complaint:follow up chronic afib  History of Present Illness  This is a 82-year-old female who follows with Dr. Bloom and is new to me today.  She has a past medical history of chronic atrial fibrillation, colon cancer, hypertension and valvular heart disease.  Saw Dr. Bloom for the first time in 2021 at which time she was reporting dyspnea on exertion.  She had had an abnormal stress test that showed no perfusion defects with transient ischemic dilatation documented along with abnormal electrocardiogram.  She had had previous CT scans that showed no evidence of coronary artery calcification.  Echocardiogram at the time that showed a preserved EF and no significant valvular disease.    She was last seen again until March 2025.  She reported continued severe dyspnea with minimal exertion.  An echocardiogram as well as a left and right heart cath were recommended.  Echocardiogram showed an elevated RVSP greater than 55 mmHg.  On her right heart cath the PA pressure was 53/23/37.  The wedge was mildly elevated as well.  She was noted to have severe mitral and tricuspid valve regurgitation.  Coronary angiograms showed mild nonobstructive coronary disease.  They discussed MitraClip but the patient was not interested in any invasive procedures.    Is here today for a follow-up visit.  She continues to have pretty significant dyspnea on exertion.  She tells me that walking short distances makes her short of breath and routine activities around the house also makes her short of breath.  She has palpitations off and on.  No complaints of chest pain.  She denies any swelling or weight gain.  At times she has some dizziness if she gets up too fast but no syncopal events or falls.  She also reports that she feels tired all the time.  Blood pressure appears to be stable.    I have reviewed and updated as appropriate  Pt comes in hypertension. Pt took her BP medication at 2030. Around 2130 they rechecked BP and it was high and have been checking every hour. 211 systolic was the highest reading. Pt denies chest pain and SOB.   allergies, current medications, past family history, past medical history, past surgical history and problem list.    Review of Systems   Constitutional: Positive for malaise/fatigue. Negative for fever, weight gain and weight loss.   HENT:  Negative for congestion, hoarse voice and sore throat.    Eyes:  Negative for blurred vision and double vision.   Cardiovascular:  Positive for dyspnea on exertion and palpitations. Negative for chest pain, leg swelling, orthopnea and syncope.   Respiratory:  Positive for shortness of breath. Negative for cough and wheezing.    Gastrointestinal:  Negative for abdominal pain, hematemesis, hematochezia and melena.   Genitourinary:  Negative for dysuria and hematuria.   Neurological:  Positive for dizziness. Negative for headaches, light-headedness and numbness.   Psychiatric/Behavioral:  Negative for depression. The patient is not nervous/anxious.          Current Outpatient Medications:     acetaminophen (TYLENOL) 650 MG 8 hr tablet, Take 1 tablet by mouth Every 8 (Eight) Hours As Needed for Mild Pain., Disp: , Rfl:     aspirin 81 MG EC tablet, Take 1 tablet by mouth Daily., Disp: , Rfl:     atenolol (TENORMIN) 100 MG tablet, Take 1 tablet (100 mg total) by mouth 2 (Two) Times a Day, Disp: 240 tablet, Rfl: 1    brimonidine (ALPHAGAN) 0.2 % ophthalmic solution, Administer 1 drop to both eyes 2 (Two) Times a Day., Disp: , Rfl:     calcium carbonate-vitamin d 600-400 MG-UNIT per tablet, Take 1 tablet by mouth Daily., Disp: , Rfl:     carBAMazepine XR (TEGretol  XR) 100 MG 12 hr tablet, Take 1 tablet by mouth 2 (Two) Times a Day., Disp: 180 tablet, Rfl: 4    doxazosin (CARDURA) 4 MG tablet, Take 1 tablet by mouth every night at bedtime., Disp: 90 tablet, Rfl: 4    fluticasone (FLONASE) 50 MCG/ACT nasal spray, Administer 2 sprays into the nostril(s) as directed by provider Daily., Disp: , Rfl:     furosemide (LASIX) 20 MG tablet, Take 1 tablet by mouth daily., Disp: 90 tablet, Rfl:  4    Multiple Vitamins-Minerals (CENTRUM ADULTS PO), Take  by mouth., Disp: , Rfl:     spironolactone (ALDACTONE) 25 MG tablet, Take 1 tablet by mouth Daily., Disp: , Rfl:     Synthroid 50 MCG tablet, Take 1 tablet by mouth Every Morning., Disp: , Rfl:     Past Medical History:   Diagnosis Date    Anemia     Arthritis     Atrial fibrillation     Intermittently    Colon cancer 2012    Stage III    Disease of thyroid gland     hypothyroid    Drug therapy 2012    pill for 6 months    H/O Herpes simplex     virus of the lip    Hyperlipidemia     Hypertension     Intervertebral disk disease     Polio     Trigeminal neuralgia        Past Surgical History:   Procedure Laterality Date    ARTHROSCOPY SHOULDER W/ OPEN ROTATOR CUFF REPAIR      BREAST BIOPSY Left     at least 25 years ago.    CARDIAC CATHETERIZATION  3/25/2025    Procedure: Right and Left Heart Cath;  Surgeon: Mukul Bloom MD;  Location:  [facility];  Service: Cardiology;;    CARDIAC CATHETERIZATION N/A 3/25/2025    Procedure: Coronary angiography;  Surgeon: Mukul Bloom MD;  Location:  [facility];  Service: Cardiology;  Laterality: N/A;    COLON RESECTION  08/2015    JOINT REPLACEMENT  1947    Elbow transplant and shoulder fusion    TONSILLECTOMY         Family History   Problem Relation Age of Onset    Colon cancer Father         Late in life    Heart disease Father     No Known Problems Mother        Social History     Tobacco Use    Smoking status: Never    Smokeless tobacco: Never   Vaping Use    Vaping status: Never Used   Substance Use Topics    Alcohol use: No    Drug use: No         ECG 12 Lead    Date/Time: 5/13/2025 9:04 AM  Performed by: Nataliia Nuñez APRN    Authorized by: Nataliia Nuñez APRN  Comparison: compared with previous ECG from 3/3/2025  Similar to previous ECG  Rhythm: atrial fibrillation  BPM: 84             Objective:     Visit Vitals  /78 (BP Location: Right arm, Patient  "Position: Sitting, Cuff Size: Adult)   Pulse 84   Ht 152.4 cm (60\")   Wt 41.3 kg (91 lb)   BMI 17.77 kg/m²             Physical Exam  Constitutional:       Appearance: Normal appearance. She is normal weight.   HENT:      Head: Normocephalic.   Neck:      Vascular: No carotid bruit.   Cardiovascular:      Rate and Rhythm: Normal rate. Rhythm irregularly irregular.      Chest Wall: PMI is not displaced.      Pulses: Normal pulses.           Radial pulses are 2+ on the right side and 2+ on the left side.        Posterior tibial pulses are 2+ on the right side and 2+ on the left side.      Heart sounds: No murmur heard.      with a grade of 2/6.      No friction rub. No gallop.   Pulmonary:      Effort: Pulmonary effort is normal.      Breath sounds: Rales present.   Abdominal:      General: Bowel sounds are normal. There is no distension.      Palpations: Abdomen is soft.   Musculoskeletal:      Right lower leg: No edema.      Left lower leg: No edema.   Skin:     General: Skin is warm and dry.      Capillary Refill: Capillary refill takes less than 2 seconds.   Neurological:      Mental Status: She is alert and oriented to person, place, and time.   Psychiatric:         Mood and Affect: Mood normal.         Behavior: Behavior normal.         Thought Content: Thought content normal.          Lab Review:         Cardiac Procedures:   Left and right cardiac catheterization 03/25/2025:  Procedure(s):  Right and Left Heart Cath  Coronary angiography  Conclusions:   1. Left main: Normal  2. LAD: Diffuse 20 to 30% proximal stenosis.  Discrete 30 to 40% mid vessel stenosis.  Mid septal  branch with discrete 99% stenosis  3. LCX: Diffuse 20 to 30% mid vessel stenosis  4. RCA: Luminal irregularities proximal segment.  5.  Mildly elevated right atrial and wedge pressure.  Large V wave on wedge pressure.  6.  Moderate pulmonary hypertension    Echocardiogram 3/12/2025:    Left ventricular systolic function is normal. " Left ventricular ejection fraction appears to be 61 - 65%.    Left ventricular diastolic function was indeterminate.    Moderately reduced right ventricular systolic function noted.    The right ventricular cavity is moderately dilated.    The left atrial cavity is moderately dilated.    The right atrial cavity is moderately  dilated.    There is mild calcification of the aortic valve.    There is bileaflet mitral valve thickening present.    Severe mitral valve regurgitation is present.    Severe tricuspid valve regurgitation is present.    Estimated right ventricular systolic pressure from tricuspid regurgitation is markedly elevated (>55 mmHg).    Assessment:         Diagnoses and all orders for this visit:    1. Dyspnea on exertion (Primary)    2. Nonrheumatic mitral valve regurgitation    3. Nonrheumatic tricuspid valve regurgitation            Plan:       Dyspnea on exertion secondary to VHD: severe mitral and tricuspid regurgitation. She continues to have significant dyspnea on exertion. Does not appear to be grossly volume overloaded though I do hear some rales in the bases of her lungs. We discussed again MitraClip. I'm not sure she fully understands as she stated she wants to do whatever she needs to do to feel better but then stated she doesn't want invasive measures. For now, I am going to have her increase furosemide to 40 mg daily for 3 days. I will call on Thursday to see how she is doing.  Chronic atrial fibrillation: off AC due to age and frailty. On atenolol with well controlled rates. Continue.  HTN: blood pressure stable. No changes.  Mild nonobstructive CAD: on aspirin and beta blocker. EKG stable. No anginal symptoms. Continue medical therapy.    Thank you for allowing me to participate in this patient's care. Please call with any questions or concerns. Ms Borjas will follow up with Dr. Bloom in 6 weeks.          Your medication list            Accurate as of May 12, 2025 11:59 PM. If you have  any questions, ask your nurse or doctor.                CONTINUE taking these medications        Instructions Last Dose Given Next Dose Due   acetaminophen 650 MG 8 hr tablet  Commonly known as: TYLENOL      Take 1 tablet by mouth Every 8 (Eight) Hours As Needed for Mild Pain.       aspirin 81 MG EC tablet      Take 1 tablet by mouth Daily.       atenolol 100 MG tablet  Commonly known as: TENORMIN      Take 1 tablet (100 mg total) by mouth 2 (Two) Times a Day       brimonidine 0.2 % ophthalmic solution  Commonly known as: ALPHAGAN      Administer 1 drop to both eyes 2 (Two) Times a Day.       calcium carbonate-vitamin d 600-400 MG-UNIT per tablet      Take 1 tablet by mouth Daily.       carBAMazepine  MG 12 hr tablet  Commonly known as: TEGretol  XR      Take 1 tablet by mouth 2 (Two) Times a Day.       doxazosin 4 MG tablet  Commonly known as: CARDURA      Take 1 tablet by mouth every night at bedtime.       fluticasone 50 MCG/ACT nasal spray  Commonly known as: FLONASE      Administer 2 sprays into the nostril(s) as directed by provider Daily.       furosemide 20 MG tablet  Commonly known as: LASIX      Take 1 tablet by mouth daily.       multivitamin with minerals tablet tablet      Take  by mouth.       spironolactone 25 MG tablet  Commonly known as: ALDACTONE      Take 1 tablet by mouth Daily.       Synthroid 50 MCG tablet  Generic drug: levothyroxine      Take 1 tablet by mouth Every Morning.                  ZEESHAN Roberts  05/13/25  1:57 PM EDT

## 2025-05-13 PROBLEM — I36.1 NONRHEUMATIC TRICUSPID VALVE REGURGITATION: Status: ACTIVE | Noted: 2025-05-13

## 2025-05-13 PROBLEM — I34.0 NONRHEUMATIC MITRAL VALVE REGURGITATION: Status: ACTIVE | Noted: 2025-05-13

## 2025-05-14 ENCOUNTER — TELEPHONE (OUTPATIENT)
Dept: CARDIOLOGY | Age: OVER 89
End: 2025-05-14
Payer: MEDICARE

## 2025-05-14 NOTE — TELEPHONE ENCOUNTER
Called Ms. Borjas to follow up on her SOA since increasing her furosemide to 40 mg daily over the last two day. She feels the same but forgot to take the extra dose yesterday. She will try again today. I will call tomorrow or Friday to follow back up with her symptoms.

## 2025-05-16 NOTE — TELEPHONE ENCOUNTER
Florence, with [employer] , called this afternoon. Pt only took one increased dose of furosemide 40 mg. She is still very SOA, but sats are 99%. Lung sounds are diminished in all lobes, per Florence's assessment. Florence is going to try to make sure pt gets the extra doses. She wanted to give you an update. Let me know if there's anything we need to do or change.    Florence: [phone]    Thank you,    Jyoti Pascal, RN  Triage [facility]  05/16/25 12:39 EDT

## 2025-06-04 ENCOUNTER — APPOINTMENT (OUTPATIENT)
Dept: GENERAL RADIOLOGY | Facility: HOSPITAL | Age: OVER 89
End: 2025-06-04
Payer: MEDICARE

## 2025-06-04 ENCOUNTER — HOSPITAL ENCOUNTER (EMERGENCY)
Facility: HOSPITAL | Age: OVER 89
Discharge: HOME | End: 2025-06-04
Attending: EMERGENCY MEDICINE | Admitting: EMERGENCY MEDICINE
Payer: MEDICARE

## 2025-06-04 ENCOUNTER — APPOINTMENT (OUTPATIENT)
Dept: CT IMAGING | Facility: HOSPITAL | Age: OVER 89
End: 2025-06-04
Payer: MEDICARE

## 2025-06-04 VITALS
SYSTOLIC BLOOD PRESSURE: 163 MMHG | TEMPERATURE: 97.4 F | HEIGHT: 60 IN | HEART RATE: 73 BPM | OXYGEN SATURATION: 93 % | WEIGHT: 100 LBS | DIASTOLIC BLOOD PRESSURE: 100 MMHG | BODY MASS INDEX: 19.63 KG/M2 | RESPIRATION RATE: 16 BRPM

## 2025-06-04 DIAGNOSIS — N18.9 CHRONIC KIDNEY DISEASE, UNSPECIFIED CKD STAGE: ICD-10-CM

## 2025-06-04 DIAGNOSIS — S50.12XA HEMATOMA OF LEFT FOREARM: Primary | ICD-10-CM

## 2025-06-04 DIAGNOSIS — D64.9 ANEMIA, UNSPECIFIED TYPE: ICD-10-CM

## 2025-06-04 DIAGNOSIS — D69.6 THROMBOCYTOPENIA: ICD-10-CM

## 2025-06-04 DIAGNOSIS — I48.11 LONGSTANDING PERSISTENT ATRIAL FIBRILLATION: ICD-10-CM

## 2025-06-04 LAB
ALBUMIN SERPL-MCNC: 4.1 G/DL (ref 3.5–5.2)
ALBUMIN/GLOB SERPL: 1.7 G/DL
ALP SERPL-CCNC: 104 U/L (ref 39–117)
ALT SERPL W P-5'-P-CCNC: 25 U/L (ref 1–33)
ANION GAP SERPL CALCULATED.3IONS-SCNC: 6.8 MMOL/L (ref 5–15)
AST SERPL-CCNC: 26 U/L (ref 1–32)
BASOPHILS # BLD AUTO: 0 10*3/MM3 (ref 0–0.2)
BASOPHILS NFR BLD AUTO: 0 % (ref 0–1.5)
BILIRUB SERPL-MCNC: 0.5 MG/DL (ref 0–1.2)
BILIRUB UR QL STRIP: NEGATIVE
BUN SERPL-MCNC: 44.8 MG/DL (ref 8–23)
BUN/CREAT SERPL: 26.4 (ref 7–25)
CALCIUM SPEC-SCNC: 9.6 MG/DL (ref 8.2–9.6)
CHLORIDE SERPL-SCNC: 101 MMOL/L (ref 98–107)
CLARITY UR: CLEAR
CO2 SERPL-SCNC: 27.2 MMOL/L (ref 22–29)
COLOR UR: YELLOW
CREAT SERPL-MCNC: 1.7 MG/DL (ref 0.57–1)
DEPRECATED RDW RBC AUTO: 59.8 FL (ref 37–54)
EGFRCR SERPLBLD CKD-EPI 2021: 28 ML/MIN/1.73
EOSINOPHIL # BLD AUTO: 0.1 10*3/MM3 (ref 0–0.4)
EOSINOPHIL NFR BLD AUTO: 1.4 % (ref 0.3–6.2)
ERYTHROCYTE [DISTWIDTH] IN BLOOD BY AUTOMATED COUNT: 16.2 % (ref 12.3–15.4)
GLOBULIN UR ELPH-MCNC: 2.4 GM/DL
GLUCOSE SERPL-MCNC: 110 MG/DL (ref 65–99)
GLUCOSE UR STRIP-MCNC: NEGATIVE MG/DL
HCT VFR BLD AUTO: 34.4 % (ref 34–46.6)
HGB BLD-MCNC: 10.8 G/DL (ref 12–15.9)
HGB UR QL STRIP.AUTO: NEGATIVE
IMM GRANULOCYTES # BLD AUTO: 0.01 10*3/MM3 (ref 0–0.05)
IMM GRANULOCYTES NFR BLD AUTO: 0.1 % (ref 0–0.5)
KETONES UR QL STRIP: NEGATIVE
LEUKOCYTE ESTERASE UR QL STRIP.AUTO: ABNORMAL
LYMPHOCYTES # BLD AUTO: 0.85 10*3/MM3 (ref 0.7–3.1)
LYMPHOCYTES NFR BLD AUTO: 12.2 % (ref 19.6–45.3)
MCH RBC QN AUTO: 31.2 PG (ref 26.6–33)
MCHC RBC AUTO-ENTMCNC: 31.4 G/DL (ref 31.5–35.7)
MCV RBC AUTO: 99.4 FL (ref 79–97)
MONOCYTES # BLD AUTO: 0.54 10*3/MM3 (ref 0.1–0.9)
MONOCYTES NFR BLD AUTO: 7.8 % (ref 5–12)
NEUTROPHILS NFR BLD AUTO: 5.44 10*3/MM3 (ref 1.7–7)
NEUTROPHILS NFR BLD AUTO: 78.5 % (ref 42.7–76)
NITRITE UR QL STRIP: NEGATIVE
PH UR STRIP.AUTO: 6.5 [PH] (ref 5–8)
PLATELET # BLD AUTO: 97 10*3/MM3 (ref 140–450)
PMV BLD AUTO: 10.1 FL (ref 6–12)
POTASSIUM SERPL-SCNC: 5 MMOL/L (ref 3.5–5.2)
PROT SERPL-MCNC: 6.5 G/DL (ref 6–8.5)
PROT UR QL STRIP: NEGATIVE
QT INTERVAL: 385 MS
QTC INTERVAL: 425 MS
RBC # BLD AUTO: 3.46 10*6/MM3 (ref 3.77–5.28)
SODIUM SERPL-SCNC: 135 MMOL/L (ref 136–145)
SP GR UR STRIP: 1.01 (ref 1–1.03)
UROBILINOGEN UR QL STRIP: ABNORMAL
WBC NRBC COR # BLD AUTO: 6.94 10*3/MM3 (ref 3.4–10.8)

## 2025-06-04 PROCEDURE — 96360 HYDRATION IV INFUSION INIT: CPT

## 2025-06-04 PROCEDURE — 85025 COMPLETE CBC W/AUTO DIFF WBC: CPT | Performed by: EMERGENCY MEDICINE

## 2025-06-04 PROCEDURE — 81003 URINALYSIS AUTO W/O SCOPE: CPT | Performed by: EMERGENCY MEDICINE

## 2025-06-04 PROCEDURE — 93010 ELECTROCARDIOGRAM REPORT: CPT | Performed by: INTERNAL MEDICINE

## 2025-06-04 PROCEDURE — 80053 COMPREHEN METABOLIC PANEL: CPT | Performed by: EMERGENCY MEDICINE

## 2025-06-04 PROCEDURE — 70450 CT HEAD/BRAIN W/O DYE: CPT

## 2025-06-04 PROCEDURE — 25810000003 SODIUM CHLORIDE 0.9 % SOLUTION: Performed by: EMERGENCY MEDICINE

## 2025-06-04 PROCEDURE — 71046 X-RAY EXAM CHEST 2 VIEWS: CPT

## 2025-06-04 PROCEDURE — 99284 EMERGENCY DEPT VISIT MOD MDM: CPT

## 2025-06-04 PROCEDURE — 93005 ELECTROCARDIOGRAM TRACING: CPT | Performed by: EMERGENCY MEDICINE

## 2025-06-04 PROCEDURE — 73090 X-RAY EXAM OF FOREARM: CPT

## 2025-06-04 PROCEDURE — 99284 EMERGENCY DEPT VISIT MOD MDM: CPT | Performed by: EMERGENCY MEDICINE

## 2025-06-04 RX ADMIN — SODIUM CHLORIDE 500 ML: 9 INJECTION, SOLUTION INTRAVENOUS at 16:30

## 2025-06-04 NOTE — DISCHARGE INSTRUCTIONS
Increase PO fluid intake  Return ED fever, vomiting, bleeding, worse condition, any other concerns

## 2025-06-04 NOTE — FSED PROVIDER NOTE
Subjective   History of Present Illness  89yo female pmh significant htn/hypothyroid/atrial fibrillation/colon ca/cad presents ED c/o multiple falls yesterday resulting in left forearm ecchymoses.  ROS neg headache/neck pain/back pain/chest pain/abdominal pain.    History provided by:  Patient  Fall      Review of Systems   Constitutional: Negative.    HENT: Negative.     Eyes: Negative.    Respiratory: Negative.     Cardiovascular: Negative.    Gastrointestinal: Negative.    Musculoskeletal:  Positive for arthralgias.   Neurological: Negative.    All other systems reviewed and are negative.      Past Medical History:   Diagnosis Date    Anemia     Arthritis     Atrial fibrillation     Intermittently    Colon cancer 2012    Stage III    Disease of thyroid gland     hypothyroid    Drug therapy 2012    pill for 6 months    H/O Herpes simplex     virus of the lip    Hyperlipidemia     Hypertension     Intervertebral disk disease     Polio     Trigeminal neuralgia        Allergies   Allergen Reactions    Cardizem [Diltiazem Hcl] Unknown - Low Severity    Amoxicillin Rash       Past Surgical History:   Procedure Laterality Date    ARTHROSCOPY SHOULDER W/ OPEN ROTATOR CUFF REPAIR      BREAST BIOPSY Left     at least 25 years ago.    CARDIAC CATHETERIZATION  3/25/2025    Procedure: Right and Left Heart Cath;  Surgeon: Mukul Bloom MD;  Location:  [facility];  Service: Cardiology;;    CARDIAC CATHETERIZATION N/A 3/25/2025    Procedure: Coronary angiography;  Surgeon: Mukul Bloom MD;  Location:  [facility];  Service: Cardiology;  Laterality: N/A;    COLON RESECTION  08/2015    JOINT REPLACEMENT  1947    Elbow transplant and shoulder fusion    TONSILLECTOMY         Family History   Problem Relation Age of Onset    Colon cancer Father         Late in life    Heart disease Father     No Known Problems Mother        Social History     Socioeconomic History    Marital  status:      Spouse name: Burt   Tobacco Use    Smoking status: Never    Smokeless tobacco: Never   Vaping Use    Vaping status: Never Used   Substance and Sexual Activity    Alcohol use: No    Drug use: No    Sexual activity: Defer     Partners: Male           Objective   Physical Exam  Vitals and nursing note reviewed.   Constitutional:       Appearance: Normal appearance.   HENT:      Head: Normocephalic and atraumatic.      Right Ear: Tympanic membrane, ear canal and external ear normal.      Left Ear: Tympanic membrane, ear canal and external ear normal.      Nose: Nose normal.      Mouth/Throat:      Mouth: Mucous membranes are moist.   Eyes:      Comments: Left cornea opacified (chronic)   Cardiovascular:      Rate and Rhythm: Normal rate. Rhythm irregular.      Pulses: Normal pulses.      Heart sounds: Normal heart sounds. No murmur heard.     No friction rub. No gallop.   Pulmonary:      Effort: Pulmonary effort is normal.      Breath sounds: Normal breath sounds. No wheezing, rhonchi or rales.   Abdominal:      General: Abdomen is flat. Bowel sounds are normal. There is no distension.      Palpations: Abdomen is soft.      Tenderness: There is no abdominal tenderness. There is no guarding or rebound.   Musculoskeletal:         General: Swelling, tenderness and signs of injury present. No deformity.      Left upper arm: Normal.      Left elbow: Normal.      Left forearm: Swelling and tenderness present. No edema, deformity, lacerations or bony tenderness.      Left wrist: Normal.      Left hand: Normal.        Arms:       Cervical back: Normal range of motion and neck supple. No rigidity.      Right lower leg: No edema.      Left lower leg: No edema.   Lymphadenopathy:      Cervical: No cervical adenopathy.   Skin:     General: Skin is warm and dry.   Neurological:      General: No focal deficit present.      Mental Status: She is alert and oriented to person, place, and time.      GCS: GCS eye  subscore is 4. GCS verbal subscore is 5. GCS motor subscore is 6.      Sensory: Sensation is intact.      Motor: Motor function is intact.         ECG 12 Lead      Date/Time: 6/4/2025 4:04 PM    Performed by: Trent Huggins MD  Authorized by: Trent Huggnis MD  Interpreted by ED physician  Rhythm: atrial fibrillation  Rate: normal  BPM: 73  QRS axis: normal  Conduction: conduction normal  ST Segments: ST segments normal  T Waves: T waves normal  Other: no other findings  Clinical impression: abnormal ECG and dysrhythmia - atrial               ED Course  ED Course as of 06/04/25 1658 Wed Jun 04, 2025   1657 Stable ckd.  Stable chronic thrombocytopenia. [SD]   1658 Stable dc with pmd/hematology oncology clinic followup. Strict return precautions. [SD]      ED Course User Index  [SD] Trent Huggins MD      Labs Reviewed   COMPREHENSIVE METABOLIC PANEL - Abnormal; Notable for the following components:       Result Value    Glucose 110 (*)     BUN 44.8 (*)     Creatinine 1.70 (*)     Sodium 135 (*)     BUN/Creatinine Ratio 26.4 (*)     eGFR 28.0 (*)     All other components within normal limits    Narrative:     GFR Categories in Chronic Kidney Disease (CKD)              GFR Category          GFR (mL/min/1.73)    Interpretation  G1                    90 or greater        Normal or high (1)  G2                    60-89                Mild decrease (1)  G3a                   45-59                Mild to moderate decrease  G3b                   30-44                Moderate to severe decrease  G4                    15-29                Severe decrease  G5                    14 or less           Kidney failure    (1)In the absence of evidence of kidney disease, neither GFR category G1 or G2 fulfill the criteria for CKD.    eGFR calculation 2021 CKD-EPI creatinine equation, which does not include race as a factor   URINALYSIS WITHOUT MICROSCOPIC (NO CULTURE) - Abnormal; Notable for the following components:    Leuk  Esterase, UA Trace (*)     All other components within normal limits   CBC WITH AUTO DIFFERENTIAL - Abnormal; Notable for the following components:    RBC 3.46 (*)     Hemoglobin 10.8 (*)     MCV 99.4 (*)     MCHC 31.4 (*)     RDW 16.2 (*)     RDW-SD 59.8 (*)     Platelets 97 (*)     Neutrophil % 78.5 (*)     Lymphocyte % 12.2 (*)     All other components within normal limits   CBC AND DIFFERENTIAL    Narrative:     The following orders were created for panel order CBC & Differential.  Procedure                               Abnormality         Status                     ---------                               -----------         ------                     CBC Auto Differential[[id number]]        Abnormal            Final result                 Please view results for these tests on the individual orders.     XR Forearm 2 View Left  XR Forearm 2 View Left, XR Chest 2 View  Result Date: 6/4/2025  Narrative: XR CHEST 2 VW-, XR FOREARM 2 VW LEFT-  HISTORY: Female who is 89 years-old, trauma  TECHNIQUE: Frontal and lateral views of the chest, 2 views of the left forearm  COMPARISON: 11/14/2024  FINDINGS:  Chest x-ray: The heart is enlarged. Aorta is tortuous, calcified. Pulmonary vasculature is unremarkable. No focal pulmonary consolidation, pleural effusion, or pneumothorax. A chronic lower thoracic compression deformity is noted. Chronic rib deformities are seen. No acute osseous process.  Left forearm: No acute fracture, erosion, or dislocation is identified. Degenerative changes are seen at the wrist. Soft tissue swelling is apparent at the mid aspect of the forearm.  Follow-up/further evaluation can be obtained as indications persist.      Impression: As described.  This report was finalized on 6/4/2025 4:28 PM by Dr. Esa Corea M.D on Workstation: [id number]      CT Head Without Contrast  Result Date: 6/4/2025  Narrative: CT HEAD WO CONTRAST-  HISTORY:  multiple fall  COMPARISON: None  FINDINGS: The brain the  ventricles are symmetrical. There is no acute hemorrhage, hydrocephalus or of acute infarction. Moderate to severe small vessel ischemic disease and vascular calcification is noted. The left globe is small and distorted, partially calcified. Further evaluation could be performed with a MRI of the brain as indicated.      Radiation dose reduction techniques were utilized, including automated exposure modulation based on body size.  This report was finalized on 6/4/2025 4:15 PM by Dr. Delvis Montez M.D on Workstation: [id number]                                           Medical Decision Making  Problems Addressed:  Anemia, unspecified type: complicated acute illness or injury  Chronic kidney disease, unspecified CKD stage: complicated acute illness or injury  Hematoma of left forearm: complicated acute illness or injury  Longstanding persistent atrial fibrillation: complicated acute illness or injury  Thrombocytopenia: complicated acute illness or injury    Amount and/or Complexity of Data Reviewed  Labs: ordered.  Radiology: ordered.  ECG/medicine tests: ordered and independent interpretation performed.        Final diagnoses:   Hematoma of left forearm   Anemia, unspecified type   Thrombocytopenia   Chronic kidney disease, unspecified CKD stage   Longstanding persistent atrial fibrillation       ED Disposition  ED Disposition       ED Disposition   Discharge    Condition   Good    Comment   --               Angelica Anthony MD  [address]  [place] [address]  [phone]    In 1 day      Curly Lipscomb MD  [address]  [place] [address]  [phone]    Schedule an appointment as soon as possible for a visit            Medication List      No changes were made to your prescriptions during this visit.

## 2025-06-08 ENCOUNTER — HOSPITAL ENCOUNTER (INPATIENT)
Facility: HOSPITAL | Age: OVER 89
LOS: 4 days | Discharge: NURSING, REHAB OR LONG-TERM CARE | DRG: 291 | End: 2025-06-12
Attending: EMERGENCY MEDICINE | Admitting: INTERNAL MEDICINE
Payer: MEDICARE

## 2025-06-08 ENCOUNTER — APPOINTMENT (OUTPATIENT)
Dept: CT IMAGING | Facility: HOSPITAL | Age: OVER 89
DRG: 291 | End: 2025-06-08
Payer: MEDICARE

## 2025-06-08 ENCOUNTER — APPOINTMENT (OUTPATIENT)
Dept: GENERAL RADIOLOGY | Facility: HOSPITAL | Age: OVER 89
DRG: 291 | End: 2025-06-08
Payer: MEDICARE

## 2025-06-08 DIAGNOSIS — Z86.79 HISTORY OF ATRIAL FIBRILLATION: ICD-10-CM

## 2025-06-08 DIAGNOSIS — J96.01 ACUTE HYPOXIC RESPIRATORY FAILURE: ICD-10-CM

## 2025-06-08 DIAGNOSIS — R06.02 SHORTNESS OF BREATH: Primary | ICD-10-CM

## 2025-06-08 DIAGNOSIS — B34.8 RHINOVIRUS INFECTION: ICD-10-CM

## 2025-06-08 DIAGNOSIS — R29.6 FREQUENT FALLS: ICD-10-CM

## 2025-06-08 DIAGNOSIS — Z86.79 HISTORY OF CHRONIC CHF: ICD-10-CM

## 2025-06-08 LAB
ALBUMIN SERPL-MCNC: 4.3 G/DL (ref 3.5–5.2)
ALBUMIN/GLOB SERPL: 1.5 G/DL
ALP SERPL-CCNC: 124 U/L (ref 39–117)
ALT SERPL W P-5'-P-CCNC: 29 U/L (ref 1–33)
ANION GAP SERPL CALCULATED.3IONS-SCNC: 10.6 MMOL/L (ref 5–15)
AST SERPL-CCNC: 24 U/L (ref 1–32)
B PARAPERT DNA SPEC QL NAA+PROBE: NOT DETECTED
B PERT DNA SPEC QL NAA+PROBE: NOT DETECTED
BASOPHILS # BLD AUTO: 0.01 10*3/MM3 (ref 0–0.2)
BASOPHILS NFR BLD AUTO: 0.2 % (ref 0–1.5)
BILIRUB SERPL-MCNC: 0.4 MG/DL (ref 0–1.2)
BUN SERPL-MCNC: 47 MG/DL (ref 8–23)
BUN/CREAT SERPL: 28.8 (ref 7–25)
C PNEUM DNA NPH QL NAA+NON-PROBE: NOT DETECTED
CALCIUM SPEC-SCNC: 9.6 MG/DL (ref 8.2–9.6)
CARBAMAZEPINE SERPL-MCNC: 5.8 MCG/ML (ref 4–12)
CHLORIDE SERPL-SCNC: 99 MMOL/L (ref 98–107)
CO2 SERPL-SCNC: 26.4 MMOL/L (ref 22–29)
CREAT SERPL-MCNC: 1.63 MG/DL (ref 0.57–1)
DEPRECATED RDW RBC AUTO: 52.1 FL (ref 37–54)
EGFRCR SERPLBLD CKD-EPI 2021: 29.5 ML/MIN/1.73
EOSINOPHIL # BLD AUTO: 0.02 10*3/MM3 (ref 0–0.4)
EOSINOPHIL NFR BLD AUTO: 0.3 % (ref 0.3–6.2)
ERYTHROCYTE [DISTWIDTH] IN BLOOD BY AUTOMATED COUNT: 14.7 % (ref 12.3–15.4)
FLUAV SUBTYP SPEC NAA+PROBE: NOT DETECTED
FLUBV RNA ISLT QL NAA+PROBE: NOT DETECTED
GEN 5 1HR TROPONIN T REFLEX: 20 NG/L
GLOBULIN UR ELPH-MCNC: 2.8 GM/DL
GLUCOSE SERPL-MCNC: 93 MG/DL (ref 65–99)
HADV DNA SPEC NAA+PROBE: NOT DETECTED
HCOV 229E RNA SPEC QL NAA+PROBE: NOT DETECTED
HCOV HKU1 RNA SPEC QL NAA+PROBE: NOT DETECTED
HCOV NL63 RNA SPEC QL NAA+PROBE: NOT DETECTED
HCOV OC43 RNA SPEC QL NAA+PROBE: NOT DETECTED
HCT VFR BLD AUTO: 36.3 % (ref 34–46.6)
HGB BLD-MCNC: 11.9 G/DL (ref 12–15.9)
HMPV RNA NPH QL NAA+NON-PROBE: NOT DETECTED
HOLD SPECIMEN: NORMAL
HOLD SPECIMEN: NORMAL
HPIV1 RNA ISLT QL NAA+PROBE: NOT DETECTED
HPIV2 RNA SPEC QL NAA+PROBE: NOT DETECTED
HPIV3 RNA NPH QL NAA+PROBE: NOT DETECTED
HPIV4 P GENE NPH QL NAA+PROBE: NOT DETECTED
IMM GRANULOCYTES # BLD AUTO: 0.1 10*3/MM3 (ref 0–0.05)
IMM GRANULOCYTES NFR BLD AUTO: 1.7 % (ref 0–0.5)
LYMPHOCYTES # BLD AUTO: 0.69 10*3/MM3 (ref 0.7–3.1)
LYMPHOCYTES NFR BLD AUTO: 11.6 % (ref 19.6–45.3)
M PNEUMO IGG SER IA-ACNC: NOT DETECTED
MCH RBC QN AUTO: 31.7 PG (ref 26.6–33)
MCHC RBC AUTO-ENTMCNC: 32.8 G/DL (ref 31.5–35.7)
MCV RBC AUTO: 96.8 FL (ref 79–97)
MONOCYTES # BLD AUTO: 0.54 10*3/MM3 (ref 0.1–0.9)
MONOCYTES NFR BLD AUTO: 9.1 % (ref 5–12)
NEUTROPHILS NFR BLD AUTO: 4.6 10*3/MM3 (ref 1.7–7)
NEUTROPHILS NFR BLD AUTO: 77.1 % (ref 42.7–76)
NT-PROBNP SERPL-MCNC: 6285 PG/ML (ref 0–1800)
PLATELET # BLD AUTO: 158 10*3/MM3 (ref 140–450)
PMV BLD AUTO: 9.9 FL (ref 6–12)
POTASSIUM SERPL-SCNC: 5.2 MMOL/L (ref 3.5–5.2)
PROT SERPL-MCNC: 7.1 G/DL (ref 6–8.5)
QT INTERVAL: 387 MS
QTC INTERVAL: 483 MS
RBC # BLD AUTO: 3.75 10*6/MM3 (ref 3.77–5.28)
RHINOVIRUS RNA SPEC NAA+PROBE: DETECTED
RSV RNA NPH QL NAA+NON-PROBE: NOT DETECTED
SARS-COV-2 RNA NPH QL NAA+NON-PROBE: NOT DETECTED
SODIUM SERPL-SCNC: 136 MMOL/L (ref 136–145)
TROPONIN T % DELTA: -5
TROPONIN T NUMERIC DELTA: -1 NG/L
TROPONIN T SERPL HS-MCNC: 21 NG/L
WBC NRBC COR # BLD AUTO: 5.96 10*3/MM3 (ref 3.4–10.8)
WHOLE BLOOD HOLD COAG: NORMAL
WHOLE BLOOD HOLD SPECIMEN: NORMAL

## 2025-06-08 PROCEDURE — 36415 COLL VENOUS BLD VENIPUNCTURE: CPT

## 2025-06-08 PROCEDURE — 85025 COMPLETE CBC W/AUTO DIFF WBC: CPT

## 2025-06-08 PROCEDURE — 71250 CT THORAX DX C-: CPT

## 2025-06-08 PROCEDURE — 93005 ELECTROCARDIOGRAM TRACING: CPT

## 2025-06-08 PROCEDURE — 84484 ASSAY OF TROPONIN QUANT: CPT

## 2025-06-08 PROCEDURE — 93010 ELECTROCARDIOGRAM REPORT: CPT | Performed by: STUDENT IN AN ORGANIZED HEALTH CARE EDUCATION/TRAINING PROGRAM

## 2025-06-08 PROCEDURE — 0202U NFCT DS 22 TRGT SARS-COV-2: CPT | Performed by: EMERGENCY MEDICINE

## 2025-06-08 PROCEDURE — 72125 CT NECK SPINE W/O DYE: CPT

## 2025-06-08 PROCEDURE — 84443 ASSAY THYROID STIM HORMONE: CPT | Performed by: INTERNAL MEDICINE

## 2025-06-08 PROCEDURE — 80053 COMPREHEN METABOLIC PANEL: CPT

## 2025-06-08 PROCEDURE — 93005 ELECTROCARDIOGRAM TRACING: CPT | Performed by: EMERGENCY MEDICINE

## 2025-06-08 PROCEDURE — 71045 X-RAY EXAM CHEST 1 VIEW: CPT

## 2025-06-08 PROCEDURE — 83880 ASSAY OF NATRIURETIC PEPTIDE: CPT

## 2025-06-08 PROCEDURE — 99285 EMERGENCY DEPT VISIT HI MDM: CPT

## 2025-06-08 PROCEDURE — 70450 CT HEAD/BRAIN W/O DYE: CPT

## 2025-06-08 PROCEDURE — 80156 ASSAY CARBAMAZEPINE TOTAL: CPT | Performed by: EMERGENCY MEDICINE

## 2025-06-08 RX ORDER — LOSARTAN POTASSIUM 100 MG/1
100 TABLET ORAL DAILY
Status: DISCONTINUED | OUTPATIENT
Start: 2025-06-09 | End: 2025-06-11

## 2025-06-08 RX ORDER — IPRATROPIUM BROMIDE AND ALBUTEROL SULFATE 2.5; .5 MG/3ML; MG/3ML
3 SOLUTION RESPIRATORY (INHALATION) EVERY 6 HOURS PRN
Status: DISCONTINUED | OUTPATIENT
Start: 2025-06-08 | End: 2025-06-09

## 2025-06-08 RX ORDER — ATENOLOL 25 MG/1
100 TABLET ORAL EVERY 12 HOURS SCHEDULED
Status: DISCONTINUED | OUTPATIENT
Start: 2025-06-09 | End: 2025-06-12 | Stop reason: HOSPADM

## 2025-06-08 RX ORDER — DORZOLAMIDE HCL 20 MG/ML
1 SOLUTION/ DROPS OPHTHALMIC 3 TIMES DAILY
COMMUNITY

## 2025-06-08 RX ORDER — CALCIUM CARBONATE 500(1250)
500 TABLET ORAL DAILY
Status: DISCONTINUED | OUTPATIENT
Start: 2025-06-09 | End: 2025-06-12 | Stop reason: HOSPADM

## 2025-06-08 RX ORDER — BRIMONIDINE TARTRATE 2 MG/ML
1 SOLUTION/ DROPS OPHTHALMIC 2 TIMES DAILY
Status: DISCONTINUED | OUTPATIENT
Start: 2025-06-09 | End: 2025-06-12 | Stop reason: HOSPADM

## 2025-06-08 RX ORDER — MULTIPLE VITAMINS W/ MINERALS TAB 9MG-400MCG
1 TAB ORAL DAILY
Status: DISCONTINUED | OUTPATIENT
Start: 2025-06-09 | End: 2025-06-12 | Stop reason: HOSPADM

## 2025-06-08 RX ORDER — LEVOTHYROXINE SODIUM 50 UG/1
50 TABLET ORAL
Status: DISCONTINUED | OUTPATIENT
Start: 2025-06-09 | End: 2025-06-10

## 2025-06-08 RX ORDER — FLUTICASONE PROPIONATE 50 MCG
2 SPRAY, SUSPENSION (ML) NASAL DAILY
Status: DISCONTINUED | OUTPATIENT
Start: 2025-06-09 | End: 2025-06-12 | Stop reason: HOSPADM

## 2025-06-08 RX ORDER — FUROSEMIDE 10 MG/ML
40 INJECTION INTRAMUSCULAR; INTRAVENOUS EVERY 12 HOURS
Status: DISCONTINUED | OUTPATIENT
Start: 2025-06-09 | End: 2025-06-10

## 2025-06-08 RX ORDER — LOSARTAN POTASSIUM 25 MG/1
100 TABLET ORAL DAILY
COMMUNITY
End: 2025-06-12 | Stop reason: HOSPADM

## 2025-06-08 RX ORDER — DORZOLAMIDE HCL 20 MG/ML
1 SOLUTION/ DROPS OPHTHALMIC 3 TIMES DAILY
Status: DISCONTINUED | OUTPATIENT
Start: 2025-06-09 | End: 2025-06-12 | Stop reason: HOSPADM

## 2025-06-08 RX ORDER — CARBAMAZEPINE 100 MG/1
100 TABLET, EXTENDED RELEASE ORAL 2 TIMES DAILY
Status: DISCONTINUED | OUTPATIENT
Start: 2025-06-09 | End: 2025-06-12 | Stop reason: HOSPADM

## 2025-06-08 RX ORDER — SODIUM CHLORIDE 0.9 % (FLUSH) 0.9 %
10 SYRINGE (ML) INJECTION AS NEEDED
Status: DISCONTINUED | OUTPATIENT
Start: 2025-06-08 | End: 2025-06-12 | Stop reason: HOSPADM

## 2025-06-08 RX ORDER — ASPIRIN 81 MG/1
81 TABLET ORAL DAILY
Status: DISCONTINUED | OUTPATIENT
Start: 2025-06-09 | End: 2025-06-12 | Stop reason: HOSPADM

## 2025-06-08 RX ORDER — SPIRONOLACTONE 25 MG/1
25 TABLET ORAL DAILY
Status: DISCONTINUED | OUTPATIENT
Start: 2025-06-09 | End: 2025-06-12

## 2025-06-08 RX ORDER — TERAZOSIN 5 MG/1
5 CAPSULE ORAL NIGHTLY
Status: DISCONTINUED | OUTPATIENT
Start: 2025-06-09 | End: 2025-06-12 | Stop reason: HOSPADM

## 2025-06-09 PROBLEM — N17.9 ACUTE KIDNEY FAILURE: Status: ACTIVE | Noted: 2025-06-09

## 2025-06-09 PROBLEM — I50.33 ACUTE ON CHRONIC DIASTOLIC CHF (CONGESTIVE HEART FAILURE): Status: ACTIVE | Noted: 2025-06-09

## 2025-06-09 PROBLEM — I10 ESSENTIAL HYPERTENSION: Status: ACTIVE | Noted: 2025-06-09

## 2025-06-09 PROBLEM — B34.8 RHINOVIRUS INFECTION: Status: ACTIVE | Noted: 2025-06-09

## 2025-06-09 PROBLEM — N18.30 CHRONIC KIDNEY FAILURE, STAGE 3 (MODERATE): Status: ACTIVE | Noted: 2025-06-09

## 2025-06-09 PROBLEM — I48.91 A-FIB: Status: ACTIVE | Noted: 2025-06-09

## 2025-06-09 PROBLEM — E03.9 HYPOTHYROIDISM (ACQUIRED): Status: ACTIVE | Noted: 2025-06-09

## 2025-06-09 PROBLEM — D64.9 ANEMIA: Status: ACTIVE | Noted: 2025-06-09

## 2025-06-09 PROBLEM — R29.6 FALLS: Status: ACTIVE | Noted: 2025-06-09

## 2025-06-09 PROBLEM — R53.1 WEAKNESS: Status: ACTIVE | Noted: 2025-06-09

## 2025-06-09 PROBLEM — E78.5 HYPERLIPIDEMIA: Status: ACTIVE | Noted: 2025-06-09

## 2025-06-09 LAB
ANION GAP SERPL CALCULATED.3IONS-SCNC: 13 MMOL/L (ref 5–15)
BACTERIA UR QL AUTO: ABNORMAL /HPF
BILIRUB UR QL STRIP: NEGATIVE
BUN SERPL-MCNC: 49 MG/DL (ref 8–23)
BUN/CREAT SERPL: 25.8 (ref 7–25)
CALCIUM SPEC-SCNC: 9 MG/DL (ref 8.2–9.6)
CHLORIDE SERPL-SCNC: 97 MMOL/L (ref 98–107)
CLARITY UR: CLEAR
CO2 SERPL-SCNC: 23 MMOL/L (ref 22–29)
COLOR UR: YELLOW
CREAT SERPL-MCNC: 1.9 MG/DL (ref 0.57–1)
CREAT UR-MCNC: 22.4 MG/DL
EGFRCR SERPLBLD CKD-EPI 2021: 24.5 ML/MIN/1.73
GLUCOSE SERPL-MCNC: 100 MG/DL (ref 65–99)
GLUCOSE UR STRIP-MCNC: NEGATIVE MG/DL
HGB UR QL STRIP.AUTO: NEGATIVE
HYALINE CASTS UR QL AUTO: ABNORMAL /LPF
KETONES UR QL STRIP: NEGATIVE
LEUKOCYTE ESTERASE UR QL STRIP.AUTO: ABNORMAL
MAGNESIUM SERPL-MCNC: 2.4 MG/DL (ref 1.7–2.3)
NITRITE UR QL STRIP: NEGATIVE
OSMOLALITY UR: 377 MOSM/KG
PH UR STRIP.AUTO: 6.5 [PH] (ref 5–8)
POTASSIUM SERPL-SCNC: 4.7 MMOL/L (ref 3.5–5.2)
PROCALCITONIN SERPL-MCNC: 0.11 NG/ML (ref 0–0.25)
PROT UR QL STRIP: NEGATIVE
RBC # UR STRIP: ABNORMAL /HPF
REF LAB TEST METHOD: ABNORMAL
SODIUM SERPL-SCNC: 133 MMOL/L (ref 136–145)
SODIUM UR-SCNC: 108 MMOL/L
SP GR UR STRIP: 1.01 (ref 1–1.03)
SQUAMOUS #/AREA URNS HPF: ABNORMAL /HPF
TSH SERPL DL<=0.05 MIU/L-ACNC: 13.1 UIU/ML (ref 0.27–4.2)
URATE SERPL-MCNC: 5.5 MG/DL (ref 2.4–5.7)
UROBILINOGEN UR QL STRIP: ABNORMAL
WBC # UR STRIP: ABNORMAL /HPF

## 2025-06-09 PROCEDURE — 97530 THERAPEUTIC ACTIVITIES: CPT

## 2025-06-09 PROCEDURE — 94799 UNLISTED PULMONARY SVC/PX: CPT

## 2025-06-09 PROCEDURE — 87086 URINE CULTURE/COLONY COUNT: CPT | Performed by: INTERNAL MEDICINE

## 2025-06-09 PROCEDURE — 97165 OT EVAL LOW COMPLEX 30 MIN: CPT

## 2025-06-09 PROCEDURE — 84550 ASSAY OF BLOOD/URIC ACID: CPT | Performed by: INTERNAL MEDICINE

## 2025-06-09 PROCEDURE — 25010000002 FUROSEMIDE PER 20 MG: Performed by: INTERNAL MEDICINE

## 2025-06-09 PROCEDURE — 97166 OT EVAL MOD COMPLEX 45 MIN: CPT

## 2025-06-09 PROCEDURE — 94761 N-INVAS EAR/PLS OXIMETRY MLT: CPT

## 2025-06-09 PROCEDURE — 94640 AIRWAY INHALATION TREATMENT: CPT

## 2025-06-09 PROCEDURE — 84145 PROCALCITONIN (PCT): CPT | Performed by: INTERNAL MEDICINE

## 2025-06-09 PROCEDURE — 94760 N-INVAS EAR/PLS OXIMETRY 1: CPT

## 2025-06-09 PROCEDURE — 81001 URINALYSIS AUTO W/SCOPE: CPT | Performed by: INTERNAL MEDICINE

## 2025-06-09 PROCEDURE — 25010000002 HEPARIN (PORCINE) PER 1000 UNITS: Performed by: INTERNAL MEDICINE

## 2025-06-09 PROCEDURE — 87088 URINE BACTERIA CULTURE: CPT | Performed by: INTERNAL MEDICINE

## 2025-06-09 PROCEDURE — 82570 ASSAY OF URINE CREATININE: CPT | Performed by: INTERNAL MEDICINE

## 2025-06-09 PROCEDURE — 87186 SC STD MICRODIL/AGAR DIL: CPT | Performed by: INTERNAL MEDICINE

## 2025-06-09 PROCEDURE — 99222 1ST HOSP IP/OBS MODERATE 55: CPT | Performed by: INTERNAL MEDICINE

## 2025-06-09 PROCEDURE — 97162 PT EVAL MOD COMPLEX 30 MIN: CPT

## 2025-06-09 PROCEDURE — 84300 ASSAY OF URINE SODIUM: CPT | Performed by: INTERNAL MEDICINE

## 2025-06-09 PROCEDURE — 83735 ASSAY OF MAGNESIUM: CPT | Performed by: INTERNAL MEDICINE

## 2025-06-09 PROCEDURE — 80048 BASIC METABOLIC PNL TOTAL CA: CPT | Performed by: INTERNAL MEDICINE

## 2025-06-09 PROCEDURE — 83935 ASSAY OF URINE OSMOLALITY: CPT | Performed by: INTERNAL MEDICINE

## 2025-06-09 PROCEDURE — 94664 DEMO&/EVAL PT USE INHALER: CPT

## 2025-06-09 RX ORDER — GUAIFENESIN 600 MG/1
1200 TABLET, EXTENDED RELEASE ORAL EVERY 12 HOURS SCHEDULED
Status: DISCONTINUED | OUTPATIENT
Start: 2025-06-09 | End: 2025-06-12 | Stop reason: HOSPADM

## 2025-06-09 RX ORDER — ACETAMINOPHEN 325 MG/1
650 TABLET ORAL EVERY 6 HOURS PRN
Status: DISCONTINUED | OUTPATIENT
Start: 2025-06-09 | End: 2025-06-12 | Stop reason: HOSPADM

## 2025-06-09 RX ORDER — HEPARIN SODIUM 5000 [USP'U]/ML
5000 INJECTION, SOLUTION INTRAVENOUS; SUBCUTANEOUS EVERY 12 HOURS SCHEDULED
Status: DISCONTINUED | OUTPATIENT
Start: 2025-06-09 | End: 2025-06-12 | Stop reason: HOSPADM

## 2025-06-09 RX ORDER — IPRATROPIUM BROMIDE AND ALBUTEROL SULFATE 2.5; .5 MG/3ML; MG/3ML
3 SOLUTION RESPIRATORY (INHALATION)
Status: DISCONTINUED | OUTPATIENT
Start: 2025-06-09 | End: 2025-06-12 | Stop reason: HOSPADM

## 2025-06-09 RX ORDER — ENOXAPARIN SODIUM 100 MG/ML
30 INJECTION SUBCUTANEOUS EVERY 24 HOURS
Status: DISCONTINUED | OUTPATIENT
Start: 2025-06-09 | End: 2025-06-09

## 2025-06-09 RX ADMIN — SPIRONOLACTONE 25 MG: 25 TABLET ORAL at 09:54

## 2025-06-09 RX ADMIN — FLUTICASONE PROPIONATE 2 SPRAY: 50 SPRAY, METERED NASAL at 09:55

## 2025-06-09 RX ADMIN — IPRATROPIUM BROMIDE AND ALBUTEROL SULFATE 3 ML: .5; 3 SOLUTION RESPIRATORY (INHALATION) at 11:17

## 2025-06-09 RX ADMIN — LEVOTHYROXINE SODIUM 50 MCG: 50 TABLET ORAL at 09:54

## 2025-06-09 RX ADMIN — ATENOLOL 100 MG: 25 TABLET ORAL at 00:43

## 2025-06-09 RX ADMIN — Medication 500 MG: at 09:54

## 2025-06-09 RX ADMIN — CARBAMAZEPINE 100 MG: 100 TABLET, EXTENDED RELEASE ORAL at 09:54

## 2025-06-09 RX ADMIN — BRIMONIDINE TARTRATE 1 DROP: 2 SOLUTION/ DROPS OPHTHALMIC at 09:55

## 2025-06-09 RX ADMIN — Medication 1 TABLET: at 09:54

## 2025-06-09 RX ADMIN — FUROSEMIDE 40 MG: 10 INJECTION, SOLUTION INTRAMUSCULAR; INTRAVENOUS at 13:28

## 2025-06-09 RX ADMIN — ASPIRIN 81 MG: 81 TABLET, COATED ORAL at 09:54

## 2025-06-09 RX ADMIN — BRIMONIDINE TARTRATE 1 DROP: 2 SOLUTION/ DROPS OPHTHALMIC at 21:36

## 2025-06-09 RX ADMIN — TERAZOSIN 5 MG: 5 CAPSULE ORAL at 02:36

## 2025-06-09 RX ADMIN — IPRATROPIUM BROMIDE AND ALBUTEROL SULFATE 3 ML: .5; 3 SOLUTION RESPIRATORY (INHALATION) at 15:06

## 2025-06-09 RX ADMIN — HEPARIN SODIUM 5000 UNITS: 5000 INJECTION INTRAVENOUS; SUBCUTANEOUS at 21:35

## 2025-06-09 RX ADMIN — ACETAMINOPHEN 650 MG: 325 TABLET, FILM COATED ORAL at 21:39

## 2025-06-09 RX ADMIN — LOSARTAN POTASSIUM 100 MG: 100 TABLET, FILM COATED ORAL at 09:54

## 2025-06-09 RX ADMIN — ATENOLOL 100 MG: 25 TABLET ORAL at 21:39

## 2025-06-09 RX ADMIN — GUAIFENESIN 1200 MG: 600 TABLET, EXTENDED RELEASE ORAL at 10:00

## 2025-06-09 RX ADMIN — GUAIFENESIN 1200 MG: 600 TABLET, EXTENDED RELEASE ORAL at 21:35

## 2025-06-09 RX ADMIN — TERAZOSIN 5 MG: 5 CAPSULE ORAL at 21:36

## 2025-06-09 RX ADMIN — CARBAMAZEPINE 100 MG: 100 TABLET, EXTENDED RELEASE ORAL at 02:36

## 2025-06-09 RX ADMIN — BRIMONIDINE TARTRATE 1 DROP: 2 SOLUTION/ DROPS OPHTHALMIC at 02:37

## 2025-06-09 RX ADMIN — DORZOLAMIDE HCL 1 DROP: 20 SOLUTION/ DROPS OPHTHALMIC at 21:37

## 2025-06-09 RX ADMIN — ATENOLOL 100 MG: 25 TABLET ORAL at 09:54

## 2025-06-09 RX ADMIN — DORZOLAMIDE HCL 1 DROP: 20 SOLUTION/ DROPS OPHTHALMIC at 09:55

## 2025-06-09 RX ADMIN — HEPARIN SODIUM 5000 UNITS: 5000 INJECTION INTRAVENOUS; SUBCUTANEOUS at 09:55

## 2025-06-09 RX ADMIN — IPRATROPIUM BROMIDE AND ALBUTEROL SULFATE 3 ML: .5; 3 SOLUTION RESPIRATORY (INHALATION) at 19:43

## 2025-06-09 RX ADMIN — FUROSEMIDE 40 MG: 10 INJECTION, SOLUTION INTRAMUSCULAR; INTRAVENOUS at 00:56

## 2025-06-09 RX ADMIN — DORZOLAMIDE HCL 1 DROP: 20 SOLUTION/ DROPS OPHTHALMIC at 15:44

## 2025-06-09 RX ADMIN — CARBAMAZEPINE 100 MG: 100 TABLET, EXTENDED RELEASE ORAL at 21:36

## 2025-06-09 NOTE — PLAN OF CARE
Goal Outcome Evaluation:  Plan of Care Reviewed With: patient        Progress: improving  Outcome Evaluation: Pt is AOX4. Pt has been on 2L of O2 all night. Stats above 90%. Pt has a mild cough non- productive. Does no use a walker or cane at home, however patient has had more falls at home recently. CTM, safety. mainained.

## 2025-06-09 NOTE — PLAN OF CARE
Goal Outcome Evaluation:  Plan of Care Reviewed With: patient           Outcome Evaluation: Pt seen for PT brijeshal this AM. SHe was admitted yesterday after a fall and SOA. Pt apparently fell at home. Her  tried to help her up, but apparently suffered cardiac arrest and passed away. Family felt pt was SOA after her fall and therefore, pt came into hospital. Pt has hx of A fib, CKD, HTN, anemia, and CHF.   Pt does have hx of recent falls. At baseline, pt was independent w mobility and ADLs without use of AD. Pt is alert and oriented x 4. She currently presents w generalized weakness and decreased activity tolerance. She is on 2L O2 during evaluation. Pt able to transition to EOB w SBA. She stood w CGA/HHA and ambulated approx 40 in room w CGA/HHA. No unsteadiness noted. Pt agreeable to sit up in recliner at end of session. Unsure of DC needs a this time. Home w family vs SNF. Pt will continue to benefit from skilled PT to maximize safety, strength, and independence w mobility.    Anticipated Discharge Disposition (PT): home with assist, home with home health, skilled nursing facility

## 2025-06-09 NOTE — THERAPY EVALUATION
Patient Name: Inge Borjas  : 1932    MRN: [id number]                              Today's Date: 2025       Admit Date: 2025    Visit Dx:     ICD-10-CM ICD-9-CM   1. Shortness of breath  R06.02 786.05   2. Frequent falls  R29.6 V15.88   3. History of atrial fibrillation  Z86.79 V12.59   4. History of chronic CHF  Z86.79 V12.59   5. Acute hypoxic respiratory failure  J96.01 518.81   6. Rhinovirus infection  B34.8 079.3     Patient Active Problem List   Diagnosis    Trigeminal neuralgia    Malignant neoplasm of sigmoid colon    COLVIN (dyspnea on exertion)    Unstable angina    Dyspnea on exertion    Nonrheumatic tricuspid valve regurgitation    Mitral regurgitation    Shortness of breath    Rhinovirus infection    Weakness    Falls    A-fib    Essential hypertension    Acute on chronic diastolic CHF (congestive heart failure)    Hypothyroidism (acquired)    Hyperlipidemia    Acute kidney failure    Chronic kidney failure, stage 3 (moderate)    Anemia     Past Medical History:   Diagnosis Date    Anemia     Arthritis     Atrial fibrillation     Intermittently    Colon cancer     Stage III    Disease of thyroid gland     hypothyroid    Drug therapy 2012    pill for 6 months    H/O Herpes simplex     virus of the lip    Hyperlipidemia     Hypertension     Intervertebral disk disease     Polio     Trigeminal neuralgia      Past Surgical History:   Procedure Laterality Date    ARTHROSCOPY SHOULDER W/ OPEN ROTATOR CUFF REPAIR      BREAST BIOPSY Left     at least 25 years ago.    CARDIAC CATHETERIZATION  3/25/2025    Procedure: Right and Left Heart Cath;  Surgeon: Mukul Bloom MD;  Location:  [facility];  Service: Cardiology;;    CARDIAC CATHETERIZATION N/A 3/25/2025    Procedure: Coronary angiography;  Surgeon: Mukul Bloom MD;  Location:  [facility];  Service: Cardiology;  Laterality: N/A;    COLON RESECTION  2015    JOINT REPLACEMENT       Elbow transplant and shoulder fusion    TONSILLECTOMY        General Information       Row Name 06/09/25 1121          Physical Therapy Time and Intention    Document Type evaluation  -     Mode of Treatment physical therapy  -       Row Name 06/09/25 1121          General Information    Patient Profile Reviewed yes  -EJ     Prior Level of Function independent:;all household mobility;ADL's  does not use AD, per notes pt has hx of recent falls  -EJ     Existing Precautions/Restrictions fall  -EJ     Barriers to Rehab none identified  -       Row Name 06/09/25 1121          Living Environment    Current Living Arrangements home  -EJ     People in Home alone   just passed away 6/8/25  -       Row Name 06/09/25 1121          Stairs Within Home, Primary    Stairs, Within Home, Primary basement  -       Row Name 06/09/25 1121          Cognition    Orientation Status (Cognition) oriented x 3  -[facility] Name 06/09/25 1121          Safety Issues/Impairments Affecting Functional Mobility    Impairments Affecting Function (Mobility) strength;endurance/activity tolerance  -EJ               User Key  (r) = Recorded By, (t) = Taken By, (c) = Cosigned By      Initials Name Provider Type     Radha Suarez, PT Physical Therapist                   Mobility       Row Name 06/09/25 1122          Bed Mobility    Bed Mobility supine-sit  -EJ     Supine-Sit [place] (Bed Mobility) verbal cues;standby assist  -EJ     Assistive Device (Bed Mobility) head of bed elevated  -       Row Name 06/09/25 1122          Sit-Stand Transfer    Sit-Stand [place] (Transfers) verbal cues;contact guard  -EJ     Comment, (Sit-Stand Transfer) HHA  -EJ       Row Name 06/09/25 1122          Gait/Stairs (Locomotion)    [place] Level (Gait) verbal cues;contact guard  -EJ     Assistive Device (Gait) --  HHA  -EJ     Distance in Feet (Gait) 40  + 10ft  -EJ     Deviations/Abnormal Patterns (Gait) maxine decreased;stride  length decreased  -EJ     Comment, (Gait/Stairs) no LOB or overt unsteadiness noted  -EJ               User Key  (r) = Recorded By, (t) = Taken By, (c) = Cosigned By      Initials Name Provider Type    Radha Cook, PT Physical Therapist                   Obj/Interventions       [facility] Name 06/09/25 1123          Range of Motion Comprehensive    General Range of Motion bilateral lower extremity ROM WNL  -EJ     Comment, General Range of Motion limited R UE, hx of polio  -EJ       Row Name 06/09/25 1123          Strength Comprehensive (MMT)    Comment, General Manual Muscle Testing (MMT) Assessment generalized weakness  -EJ       Row Name 06/09/25 1123          Balance    Balance Assessment sitting static balance;standing static balance;standing dynamic balance  -EJ     Static Sitting Balance supervision  -EJ     Position, Sitting Balance sitting edge of bed  -EJ     Static Standing Balance contact guard  -EJ     Dynamic Standing Balance contact guard  -EJ               User Key  (r) = Recorded By, (t) = Taken By, (c) = Cosigned By      Initials Name Provider Type    Radha Cook, PT Physical Therapist                   Goals/Plan       Row Name 06/09/25 1132          Bed Mobility Goal 1 (PT)    Activity/Assistive Device (Bed Mobility Goal 1, PT) bed mobility activities, all  -EJ     [place] Level/Cues Needed (Bed Mobility Goal 1, PT) supervision required  -EJ     Time Frame (Bed Mobility Goal 1, PT) 2 weeks  -EJ       [facility] Name 06/09/25 1132          Transfer Goal 1 (PT)    Activity/Assistive Device (Transfer Goal 1, PT) transfers, all  -EJ     [place] Level/Cues Needed (Transfer Goal 1, PT) standby assist  -EJ     Time Frame (Transfer Goal 1, PT) 2 weeks  -[facility] Name 06/09/25 1132          Gait Training Goal 1 (PT)    Activity/Assistive Device (Gait Training Goal 1, PT) gait (walking locomotion)  -EJ     [place] Level (Gait Training Goal 1, PT) standby assist  -EJ     Distance  (Gait Training Goal 1, PT) 150  -EJ     Time Frame (Gait Training Goal 1, PT) 2 weeks  -EJ       Row Name 06/09/25 1132          Therapy Assessment/Plan (PT)    Planned Therapy Interventions (PT) balance training;bed mobility training;gait training;home exercise program;stretching;strengthening;stair training;ROM (range of motion);patient/family education;transfer training  -EJ               User Key  (r) = Recorded By, (t) = Taken By, (c) = Cosigned By      Initials Name Provider Type    Radha Cook, PT Physical Therapist                   Clinical Impression       Row Name 06/09/25 1125          Pain    Pretreatment Pain Rating 0/10 - no pain  -EJ     Posttreatment Pain Rating 0/10 - no pain  -EJ       Row Name 06/09/25 1125          Plan of Care Review    Plan of Care Reviewed With patient  -EJ     Outcome Evaluation Pt seen for PT eval this AM. SHe was admitted yesterday after a fall and SOA. Pt apparently fell at home. Her  tried to help her up, but apparently suffered cardiac arrest and passed away. Family felt pt was SOA after her fall and therefore, pt came into hospital. Pt has hx of A fib, CKD, HTN, anemia, and CHF.   Pt does have hx of recent falls. At baseline, pt was independent w mobility and ADLs without use of AD. Pt is alert and oriented x 4. She currently presents w generalized weakness and decreased activity tolerance. She is on 2L O2 during evaluation. Pt able to transition to EOB w SBA. She stood w CGA/HHA and ambulated approx 40 in room w CGA/HHA. No unsteadiness noted. Pt agreeable to sit up in recliner at end of session. Unsure of DC needs a this time. Home w family vs SNF. Pt will continue to benefit from skilled PT to maximize safety, strength, and independence w mobility.  -EJ       Row Name 06/09/25 1125          Therapy Assessment/Plan (PT)    Rehab Potential (PT) good  -EJ     Criteria for Skilled Interventions Met (PT) yes  -EJ     Therapy Frequency (PT) 5 times/wk   -EJ       Row Name 06/09/25 1125          Positioning and Restraints    Pre-Treatment Position in bed  -EJ     Post Treatment Position chair  -EJ     In Chair notified nsg;reclined;call light within reach;exit alarm on;encouraged to call for assist;with family/caregiver  -EJ               User Key  (r) = Recorded By, (t) = Taken By, (c) = Cosigned By      Initials Name Provider Type    Radha Cook, PT Physical Therapist                   Outcome Measures       Row Name 06/09/25 1132          How much help from another person do you currently need...    Turning from your back to your side while in flat bed without using bedrails? 4  -EJ     Moving from lying on back to sitting on the side of a flat bed without bedrails? 4  -EJ     Moving to and from a bed to a chair (including a wheelchair)? 3  -EJ     Standing up from a chair using your arms (e.g., wheelchair, bedside chair)? 3  -EJ     Climbing 3-5 steps with a railing? 3  -EJ     To walk in hospital room? 3  -EJ     AM-PAC 6 Clicks Score (PT) 20  -EJ               User Key  (r) = Recorded By, (t) = Taken By, (c) = Cosigned By      Initials Name Provider Type    Radha Cook, PT Physical Therapist                                   PT Recommendation and Plan  Planned Therapy Interventions (PT): balance training, bed mobility training, gait training, home exercise program, stretching, strengthening, stair training, ROM (range of motion), patient/family education, transfer training  Outcome Evaluation: Pt seen for PT eval this AM. SHe was admitted yesterday after a fall and SOA. Pt apparently fell at home. Her  tried to help her up, but apparently suffered cardiac arrest and passed away. Family felt pt was SOA after her fall and therefore, pt came into hospital. Pt has hx of A fib, CKD, HTN, anemia, and CHF.   Pt does have hx of recent falls. At baseline, pt was independent w mobility and ADLs without use of AD. Pt is alert and oriented x 4.  She currently presents w generalized weakness and decreased activity tolerance. She is on 2L O2 during evaluation. Pt able to transition to EOB w SBA. She stood w CGA/HHA and ambulated approx 40 in room w CGA/HHA. No unsteadiness noted. Pt agreeable to sit up in recliner at end of session. Unsure of DC needs a this time. Home w family vs SNF. Pt will continue to benefit from skilled PT to maximize safety, strength, and independence w mobility.     Time Calculation:         PT Charges       Row Name 06/09/25 1133             Time Calculation    Start Time 1053  -EJ      Stop Time 1110  -EJ      Time Calculation (min) 17 min  -EJ      PT Received On 06/09/25  -EJ      PT - Next Appointment 06/10/25  -EJ      PT Goal Re-Cert Due Date 06/23/25  -EJ         Time Calculation- PT    Total Timed Code Minutes- PT 12 minute(s)  -EJ                User Key  (r) = Recorded By, (t) = Taken By, (c) = Cosigned By      Initials Name Provider Type    EJ Radha Suarez, PT Physical Therapist                  Therapy Charges for Today       Code Description Service Date Service Provider Modifiers Qty    36374469204  PT EVAL MOD COMPLEXITY 3 6/9/2025 Radha Suarez, PT GP 1    36662515073 HC PT THERAPEUTIC ACT EA 15 MIN 6/9/2025 Radha Suarez, PT GP 1            PT G-Codes  AM-PAC 6 Clicks Score (PT): 20  PT Discharge Summary  Anticipated Discharge Disposition (PT): home with assist, home with home health, skilled nursing facility    Radha Suarez PT  6/9/2025

## 2025-06-09 NOTE — H&P
HISTORY AND PHYSICAL   [facility]        Date of Admission: 2025  Patient Identification:  Name: Inge Borjas  Age: 89 y.o.  Sex: female  :  1932  MRN: [id number]                     Primary Care Physician: Angelica Anthony MD    Chief Complaint:  89 year old female presented to the emergency room after she had several falls at home; she also had some shortness of breath; denies chest pain; no fever or chills; her  was at home with her and had a cardiac arrest; he was also transported to the ED where he passed away this evening;    History of Present Illness:   As above    Past Medical History:  Past Medical History:   Diagnosis Date    Anemia     Arthritis     Atrial fibrillation     Intermittently    Colon cancer     Stage III    Disease of thyroid gland     hypothyroid    Drug therapy     pill for 6 months    H/O Herpes simplex     virus of the lip    Hyperlipidemia     Hypertension     Intervertebral disk disease     Polio     Trigeminal neuralgia      Past Surgical History:  Past Surgical History:   Procedure Laterality Date    ARTHROSCOPY SHOULDER W/ OPEN ROTATOR CUFF REPAIR      BREAST BIOPSY Left     at least 25 years ago.    CARDIAC CATHETERIZATION  3/25/2025    Procedure: Right and Left Heart Cath;  Surgeon: Mukul Bloom MD;  Location:  [facility];  Service: Cardiology;;    CARDIAC CATHETERIZATION N/A 3/25/2025    Procedure: Coronary angiography;  Surgeon: Mukul Bloom MD;  Location:  [facility];  Service: Cardiology;  Laterality: N/A;    COLON RESECTION  2015    JOINT REPLACEMENT      Elbow transplant and shoulder fusion    TONSILLECTOMY        Home Meds:  Medications Prior to Admission   Medication Sig Dispense Refill Last Dose/Taking    acetaminophen (TYLENOL) 650 MG 8 hr tablet Take 1 tablet by mouth Every 8 (Eight) Hours As Needed for Mild Pain.   2025    aspirin 81 MG EC tablet Take 1 tablet  by mouth Daily.   6/8/2025    atenolol (TENORMIN) 100 MG tablet Take 1 tablet (100 mg total) by mouth 2 (Two) Times a Day 240 tablet 1 6/8/2025    brimonidine (ALPHAGAN) 0.2 % ophthalmic solution Administer 1 drop to both eyes 2 (Two) Times a Day.   6/8/2025    calcium carbonate-vitamin d 600-400 MG-UNIT per tablet Take 1 tablet by mouth Daily.   6/8/2025    carBAMazepine XR (TEGretol  XR) 100 MG 12 hr tablet Take 1 tablet by mouth 2 (Two) Times a Day. 180 tablet 4 6/8/2025    dorzolamide (TRUSOPT) 2 % ophthalmic solution 1 drop 3 (Three) Times a Day.   6/8/2025    doxazosin (CARDURA) 4 MG tablet Take 1 tablet by mouth every night at bedtime. 90 tablet 4 6/7/2025    fluticasone (FLONASE) 50 MCG/ACT nasal spray Administer 2 sprays into the nostril(s) as directed by provider Daily.   6/8/2025    furosemide (LASIX) 20 MG tablet Take 1 tablet by mouth daily. 90 tablet 4 6/8/2025    losartan (COZAAR) 25 MG tablet Take 4 tablets by mouth Daily.   6/8/2025    Multiple Vitamins-Minerals (CENTRUM ADULTS PO) Take  by mouth.   6/8/2025    spironolactone (ALDACTONE) 25 MG tablet Take 1 tablet by mouth Daily.   Past Month    Synthroid 50 MCG tablet Take 1 tablet by mouth Every Morning.   6/8/2025       Allergies:  Allergies   Allergen Reactions    Cardizem [Diltiazem Hcl] Unknown - Low Severity    Amoxicillin Rash     Immunizations:  Immunization History   Administered Date(s) Administered    COVID-19 (PFIZER) 12YRS+ (COMIRNATY) 10/18/2024    COVID-19 (PFIZER) BIVALENT 12+YRS 01/17/2023    COVID-19 (PFIZER) Purple Cap Monovalent 03/04/2021, 03/25/2021, 10/29/2021    Covid-19 (Pfizer) Gray Cap Monovalent 06/17/2022    FLUAD TRI 65YR+ 11/11/2019    Fluad Quad 65+ 09/29/2020    Fluzone High-Dose 65+YRS 10/12/2016, 10/20/2017, 10/29/2018    Fluzone High-Dose 65+yrs 11/16/2021, 10/24/2022, 11/01/2023    Shingrix 08/21/2019, 12/03/2019    TD Preservative Free (Tenivac) 06/03/2019     Social History:   Social History     Social History  Narrative    Not on file     Social History     Socioeconomic History    Marital status:      Spouse name: Burt   Tobacco Use    Smoking status: Never    Smokeless tobacco: Never   Vaping Use    Vaping status: Never Used   Substance and Sexual Activity    Alcohol use: No    Drug use: No    Sexual activity: Defer     Partners: Male       Family History:  Family History   Problem Relation Age of Onset    Colon cancer Father         Late in life    Heart disease Father     No Known Problems Mother         Review of Systems  See history of present illness and past medical history.  Patient denies headache, dizziness, syncope,  trauma, change in vision, change in hearing, change in taste, changes in weight, changes in appetite, focal weakness, numbness, or paresthesia.  Patient denies chest pain, palpitations, dyspnea, orthopnea, PND, cough, sinus pressure, rhinorrhea, epistaxis, hemoptysis, nausea, vomiting,hematemesis, diarrhea, constipation or hematochezia.  Denies cold or heat intolerance, polydipsia, polyuria, polyphagia. Denies hematuria, pyuria, dysuria, hesitancy, frequency or urgency. Denies consumption of raw and under cooked meats foods or change in water source.  Denies fever, chills, sweats, night sweats.      Objective:  T Max 24 hrs: Temp (24hrs), Av °F (36.7 °C), Min:98 °F (36.7 °C), Max:98 °F (36.7 °C)    Vitals Ranges:   Temp:  [98 °F (36.7 °C)] 98 °F (36.7 °C)  Heart Rate:  [78-97] 93  Resp:  [16] 16  BP: (154-168)/() 162/108      Exam:  BP (!) 162/108   Pulse 93   Temp 98 °F (36.7 °C)   Resp 16   SpO2 94%     General Appearance:    Alert, cooperative, no distress, appears stated age   Head:    Normocephalic, without obvious abnormality, atraumatic   Eyes:    PERRL, conjunctivae/corneas clear, EOM's intact, both eyes   Ears:    Normal external ear canals, both ears   Nose:   Nares normal, septum midline, mucosa normal, no drainage    or sinus tenderness   Throat:   Lips, mucosa,  and tongue normal   Neck:   Supple, symmetrical, trachea midline, no adenopathy;     thyroid:  no enlargement/tenderness/nodules; no carotid    bruit or JVD   Back:     Symmetric, no curvature, ROM normal, no CVA tenderness   Lungs:     Clear to auscultation bilaterally, respirations unlabored   Chest Wall:    No tenderness or deformity    Heart:    Regular rate and rhythm, S1 and S2 normal, no murmur, rub   or gallop   Abdomen:     Soft, nontender, bowel sounds active all four quadrants,     no masses, no hepatomegaly, no splenomegaly   Extremities:   Extremities normal, atraumatic, no cyanosis or edema                       .    Data Review:  Labs in chart were reviewed.  WBC   Date Value Ref Range Status   06/08/2025 5.96 3.40 - 10.80 10*3/mm3 Final     Hemoglobin   Date Value Ref Range Status   06/08/2025 11.9 (L) 12.0 - 15.9 g/dL Final     Hematocrit   Date Value Ref Range Status   06/08/2025 36.3 34.0 - 46.6 % Final     Platelets   Date Value Ref Range Status   06/08/2025 158 140 - 450 10*3/mm3 Final     Sodium   Date Value Ref Range Status   06/08/2025 136 136 - 145 mmol/L Final     Potassium   Date Value Ref Range Status   06/08/2025 5.2 3.5 - 5.2 mmol/L Final     Chloride   Date Value Ref Range Status   06/08/2025 99 98 - 107 mmol/L Final     CO2   Date Value Ref Range Status   06/08/2025 26.4 22.0 - 29.0 mmol/L Final     BUN   Date Value Ref Range Status   06/08/2025 47.0 (H) 8.0 - 23.0 mg/dL Final     Creatinine   Date Value Ref Range Status   06/08/2025 1.63 (H) 0.57 - 1.00 mg/dL Final     Glucose   Date Value Ref Range Status   06/08/2025 93 65 - 99 mg/dL Final     Calcium   Date Value Ref Range Status   06/08/2025 9.6 8.2 - 9.6 mg/dL Final     AST (SGOT)   Date Value Ref Range Status   06/08/2025 24 1 - 32 U/L Final     ALT (SGPT)   Date Value Ref Range Status   06/08/2025 29 1 - 33 U/L Final     Alkaline Phosphatase   Date Value Ref Range Status   06/08/2025 124 (H) 39 - 117 U/L Final     No results  "found for: \"APTT\", \"INR\"             Imaging Results (All)       Procedure Component Value Units Date/Time    CT Chest Without Contrast Diagnostic [[id number]] Collected: 06/08/25 2102     Updated: 06/08/25 2111    Narrative:      CT OF THE CHEST WITHOUT CONTRAST     HISTORY: Frequent falls. Shortness of breath.     COMPARISON: None available.     TECHNIQUE: Axial CT imaging was obtained through the thorax. No IV  contrast was administered.     FINDINGS:  Old bilateral rib fractures are noted. The patient also has compression  deformity of T10, which was also present on exam from November 14, 2024.  There is thoracic scoliosis, with convexity to the left. There are  advanced degenerative changes of the right shoulder. There is biapical  scarring. Mediastinal lymph nodes do not appear pathologically enlarged.  The heart is enlarged. There is some interlobular septal thickening, as  well as trace right pleural effusion and small left pleural effusion.  Appearance suggests congestive heart failure. There is scarring noted at  the lung bases. The thoracic aorta is normal in caliber. There is  enlargement of the main pulmonary artery, which can be seen in the  setting of pulmonary arterial hypertension. Thyroid gland, trachea, and  esophagus are within normal limits. Images through the upper abdomen  demonstrate a left renal cyst. Patient does appear to have some body  wall edema.       Impression:         1. No acute traumatic injury identified.  2. Cardiomegaly, interlobular septal thickening, and small effusions,  suggesting congestive heart failure.     Radiation dose reduction techniques were utilized, including automated  exposure control and exposure modulation based on body size.        This report was finalized on 6/8/2025 9:08 PM by Dr. Neela Arvizu M.D on Workstation: [id number]       CT Cervical Spine Without Contrast [[id number]] Collected: 06/08/25 2058     Updated: 06/08/25 2105    Narrative:      CT " OF THE CERVICAL SPINE     HISTORY: Neck pain. Multiple falls.     COMPARISON: None available.     TECHNIQUE: Axial CT imaging was obtained through the cervical spine.  Coronal and sagittal reformatted images were obtained.     FINDINGS:  No acute fracture or subluxation of the cervical spine is seen. There is  anterolisthesis of C3 on C4 and C4 on C5. There is some retrolisthesis  of C5 on C6. Intervertebral disc space narrowing is most significant at  C5-C6 and C6-C7. There is no prevertebral soft tissue swelling.  Moderately Cini significant canal stenosis at any level. Neuroforaminal  narrowing is most significant on the left at C5-C6.       Impression:      No acute fracture or subluxation identified.     Radiation dose reduction techniques were utilized, including automated  exposure control and exposure modulation based on body size.        This report was finalized on 6/8/2025 9:02 PM by Dr. Neela Arvizu M.D on Workstation: [id number]       CT Head Without Contrast [[id number]] Collected: 06/08/25 2054     Updated: 06/08/25 2101    Narrative:      CT OF THE HEAD WITHOUT CONTRAST     HISTORY: Multiple falls     COMPARISON: June 4, 2025     TECHNIQUE: Axial CT imaging was obtained through the brain. No IV  contrast was administered.     FINDINGS:  No acute intracranial hemorrhage is seen. There is atrophy. There is  periventricular and deep white matter microangiopathic change. There is  some encephalomalacia suspected within the left middle cranial fossa. No  calvarial fracture is seen. The paranasal sinuses and mastoid air cells  appear clear. There are asymmetric degenerative changes of the right  TMJ. Left globe appears small and distorted.       Impression:      No acute intracranial findings.     Radiation dose reduction techniques were utilized, including automated  exposure control and exposure modulation based on body size.        This report was finalized on 6/8/2025 8:58 PM by Dr. Keita  YAS Arvizu on Workstation: [id number]       XR Chest 1 View [[id number]] Collected: 06/08/25 1740     Updated: 06/08/25 1746    Narrative:      XR CHEST 1 VW-        INDICATION: Shortness of air     COMPARISON: Chest radiograph June 4, 2025     TECHNIQUE: 1 view chest     FINDINGS:      Calcified pulmonary nodules in the left lower lung, consistent with  prior granulomatous infection. Linear opacities at the left lung base.  No effusions. Enlarged cardiac silhouette. Calcified mediastinal and  left hilar lymph nodes, consistent with prior granulomatous infection.  Old right-sided rib fractures. Left humeral head anchor and suspected  old Hill-Sachs deformity. Right glenohumeral arthropathy, incompletely  imaged. Thoracic dextrocurvature.       Impression:         1. Stable chest.  2. Cardiomegaly.  3. Subsegmental atelectasis or scarring suspected at the left lung base  4. Old right rib cage trauma     This report was finalized on 6/8/2025 5:43 PM by Dr. Curly Cee M.D  on Workstation: [id number]                 Assessment:  Active Hospital Problems    Diagnosis  POA    **Shortness of breath [R06.02]  Yes      Resolved Hospital Problems   No resolved problems to display.   Falls  Weakness  Underweight  Ckd3  Mitral regurgitation  Acute diastolic chf  Hypothyroidism  Hypertension  A fib  Pulmonary hypertension    Plan:  Ask cardiology to see her  Elaina  Pt.ot to see  Trend labs      Harper Jacques MD  6/8/2025  23:23 EDT

## 2025-06-09 NOTE — DISCHARGE PLACEMENT REQUEST
"Inge Borjas (89 y.o. Female)       Date of Birth   08/06/1932    Social Security Number       Address   [address] [place] [address]    Home Phone   [phone]    MRN   [id number]       [facility]    Marital Status                               Admission Date   6/8/2025    Admission Type   Emergency    Admitting Provider   Harper Jacques MD    Attending Provider   Noah Coyle MD    Department, Room/Bed   [address], [address]       Discharge Date       Discharge Disposition       Discharge Destination                                 Attending Provider: Noah Coyle MD    Allergies: Cardizem [Diltiazem Hcl], Amoxicillin    Isolation: Droplet   Infection: Rhinovirus  (06/08/25)   Code Status: CPR    Ht: 152.4 cm (60\")   Wt: 43.1 kg (95 lb 1.6 oz)    Admission Cmt: None   Principal Problem: Shortness of breath [R06.02]                   Active Insurance as of 6/8/2025       Primary Coverage       Payor Plan Insurance Group Employer/Plan Group    MEDICARE MEDICARE A & B        Payor Plan Address Payor Plan Phone Number Payor Plan Fax Number Effective Dates    [address] [phone]  10/1/1999 - None Entered    [facility] [id number]         Subscriber Name Subscriber Birth Date Member ID       INGE BORJAS 8/6/1932 [id number]               Secondary Coverage       Payor Plan Insurance Group Employer/Plan Group    PH[employer]IC[employer]ANS [place] [employer] F       Payor Plan Address Payor Plan Phone Number Payor Plan Fax Number Effective Dates    ATTN CLAIMS DEPT [phone]  6/1/2002 - None Entered    [address] [place] [id number]         Subscriber Name Subscriber Birth Date Member ID       INGE BORJAS 8/6/1932 [id number]                     Emergency Contacts        (Rel.) Home Phone Work Phone Mobile Phone    Precious Paula ([facility]) (Friend) -- -- [phone]                "

## 2025-06-09 NOTE — PROGRESS NOTES
Name: Inge Borjas ADMIT: 2025   : 1932  PCP: Angelica Anthony MD    MRN: [id number] LOS: 1 days   AGE/SEX: 89 y.o. female  ROOM: [facility]     Subjective   Subjective   Patient continues with weakness and lethargy.  No myalgia.  She continues with shortness of breath.  No chest pain.  No palpitation.  No cough.  No ankle edema.  No PND orthopnea.  No fever or chills.    Review of Systems  GI.  Decreased p.o. intake and appetite.  No abdominal pain.  No nausea or vomiting.  .  Denies dysuria or hematuria.  CNS.  No focal neurological symptoms.     Objective   Objective   Vital Signs  Temp:  [97.5 °F (36.4 °C)-98 °F (36.7 °C)] 97.5 °F (36.4 °C)  Heart Rate:  [74-97] 74  Resp:  [16-18] 18  BP: (122-170)/() 123/78  SpO2:  [87 %-100 %] 100 %  on  Flow (L/min) (Oxygen Therapy):  [2] 2;   Device (Oxygen Therapy): nasal cannula    Intake/Output Summary (Last 24 hours) at 2025 0937  Last data filed at 2025 0255  Gross per 24 hour   Intake --   Output 300 ml   Net -300 ml     Body mass index is 18.57 kg/m².      25  2300   Weight: 43.1 kg (95 lb 1.6 oz)     Physical Exam  General.  Elderly female.  Underweight.  Very friable.  Lethargic.  Somnolent but arousable.  Oriented times 3 out of 4.  In no apparent respiratory distress or pain.  Eyes.  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  No pallor or jaundice.  Oral cavity.  Moist mucous membrane.  Neck.  Distended external jugular veins.  No JVD.  No lymphadenopathy or thyromegaly.  Cardio vascular regular rate and rhythm with a grade 2 systolic murmur.  Occasional ectopic beats.  Chest.  Poor bilateral air entry with bilateral rhonchi.  Abdomen.  Soft lax.  No tenderness.  No organomegaly.  No guarding or rebound.  EXTR.  No clubbing/cyanosis/edema.  CNS.  No gross focal neurological deficits      Results Review:      Results from last 7 days   Lab Units 25  1715 25  1507   SODIUM mmol/L 136 135*   POTASSIUM mmol/L 5.2 5.0  "  CHLORIDE mmol/L 99 101   CO2 mmol/L 26.4 27.2   BUN mg/dL 47.0* 44.8*   CREATININE mg/dL 1.63* 1.70*   GLUCOSE mg/dL 93 110*   CALCIUM mg/dL 9.6 9.6   AST (SGOT) U/L 24 26   ALT (SGPT) U/L 29 25     Estimated Creatinine Clearance: 15 mL/min (A) (by C-G formula based on SCr of 1.63 mg/dL (H)).          Results from last 7 days   Lab Units 06/08/25  1817 06/08/25  1715   HSTROP T ng/L 20* 21*     Results from last 7 days   Lab Units 06/08/25  1715   PROBNP pg/mL 6,285.0*                   Invalid input(s): \"LDLCALC\"  Results from last 7 days   Lab Units 06/08/25  1715 06/04/25  1507   WBC 10*3/mm3 5.96 6.94   HEMOGLOBIN g/dL 11.9* 10.8*   HEMATOCRIT % 36.3 34.4   PLATELETS 10*3/mm3 158 97*   MCV fL 96.8 99.4*   MCH pg 31.7 31.2   MCHC g/dL 32.8 31.4*   RDW % 14.7 16.2*   RDW-SD fl 52.1 59.8*   MPV fL 9.9 10.1   NEUTROPHIL % % 77.1* 78.5*   LYMPHOCYTE % % 11.6* 12.2*   MONOCYTES % % 9.1 7.8   EOSINOPHIL % % 0.3 1.4   BASOPHIL % % 0.2 0.0   IMM GRAN % % 1.7* 0.1   NEUTROS ABS 10*3/mm3 4.60 5.44   LYMPHS ABS 10*3/mm3 0.69* 0.85   MONOS ABS 10*3/mm3 0.54 0.54   EOS ABS 10*3/mm3 0.02 0.10   BASOS ABS 10*3/mm3 0.01 0.00   IMMATURE GRANS (ABS) 10*3/mm3 0.10* 0.01                             Results from last 7 days   Lab Units 06/08/25  1840   ADENOVIRUS DETECTION BY PCR  Not Detected   CORONAVIRUS 229E  Not Detected   CORONAVIRUS HKU1  Not Detected   CORONAVIRUS NL63  Not Detected   CORONAVIRUS OC43  Not Detected   HUMAN METAPNEUMOVIRUS  Not Detected   HUMAN RHINOVIRUS/ENTEROVIRUS  Detected*   INFLUENZA B PCR  Not Detected   PARAINFLUENZA 1  Not Detected   PARAINFLUENZA VIRUS 2  Not Detected   PARAINFLUENZA VIRUS 3  Not Detected   PARAINFLUENZA VIRUS 4  Not Detected   BORDETELLA PERTUSSIS PCR  Not Detected   CHLAMYDOPHILA PNEUMONIAE PCR  Not Detected   MYCOPLAMA PNEUMO PCR  Not Detected   INFLUENZA A PCR  Not Detected   RSV, PCR  Not Detected     Results from last 7 days   Lab Units 06/04/25  1514   NITRITE UA  Negative "           Imaging:  Imaging Results (Last 24 Hours)       Procedure Component Value Units Date/Time    CT Chest Without Contrast Diagnostic [[id number]] Collected: 06/08/25 2102     Updated: 06/08/25 2111    Narrative:      CT OF THE CHEST WITHOUT CONTRAST     HISTORY: Frequent falls. Shortness of breath.     COMPARISON: None available.     TECHNIQUE: Axial CT imaging was obtained through the thorax. No IV  contrast was administered.     FINDINGS:  Old bilateral rib fractures are noted. The patient also has compression  deformity of T10, which was also present on exam from November 14, 2024.  There is thoracic scoliosis, with convexity to the left. There are  advanced degenerative changes of the right shoulder. There is biapical  scarring. Mediastinal lymph nodes do not appear pathologically enlarged.  The heart is enlarged. There is some interlobular septal thickening, as  well as trace right pleural effusion and small left pleural effusion.  Appearance suggests congestive heart failure. There is scarring noted at  the lung bases. The thoracic aorta is normal in caliber. There is  enlargement of the main pulmonary artery, which can be seen in the  setting of pulmonary arterial hypertension. Thyroid gland, trachea, and  esophagus are within normal limits. Images through the upper abdomen  demonstrate a left renal cyst. Patient does appear to have some body  wall edema.       Impression:         1. No acute traumatic injury identified.  2. Cardiomegaly, interlobular septal thickening, and small effusions,  suggesting congestive heart failure.     Radiation dose reduction techniques were utilized, including automated  exposure control and exposure modulation based on body size.        This report was finalized on 6/8/2025 9:08 PM by Dr. Neela Arvizu M.D on Workstation: [id number]       CT Cervical Spine Without Contrast [[id number]] Collected: 06/08/25 2058     Updated: 06/08/25 2105    Narrative:      CT OF THE  CERVICAL SPINE     HISTORY: Neck pain. Multiple falls.     COMPARISON: None available.     TECHNIQUE: Axial CT imaging was obtained through the cervical spine.  Coronal and sagittal reformatted images were obtained.     FINDINGS:  No acute fracture or subluxation of the cervical spine is seen. There is  anterolisthesis of C3 on C4 and C4 on C5. There is some retrolisthesis  of C5 on C6. Intervertebral disc space narrowing is most significant at  C5-C6 and C6-C7. There is no prevertebral soft tissue swelling.  Moderately Cini significant canal stenosis at any level. Neuroforaminal  narrowing is most significant on the left at C5-C6.       Impression:      No acute fracture or subluxation identified.     Radiation dose reduction techniques were utilized, including automated  exposure control and exposure modulation based on body size.        This report was finalized on 6/8/2025 9:02 PM by Dr. Neela Arvizu M.D on Workstation: [id number]       CT Head Without Contrast [[id number]] Collected: 06/08/25 2054     Updated: 06/08/25 2101    Narrative:      CT OF THE HEAD WITHOUT CONTRAST     HISTORY: Multiple falls     COMPARISON: June 4, 2025     TECHNIQUE: Axial CT imaging was obtained through the brain. No IV  contrast was administered.     FINDINGS:  No acute intracranial hemorrhage is seen. There is atrophy. There is  periventricular and deep white matter microangiopathic change. There is  some encephalomalacia suspected within the left middle cranial fossa. No  calvarial fracture is seen. The paranasal sinuses and mastoid air cells  appear clear. There are asymmetric degenerative changes of the right  TMJ. Left globe appears small and distorted.       Impression:      No acute intracranial findings.     Radiation dose reduction techniques were utilized, including automated  exposure control and exposure modulation based on body size.        This report was finalized on 6/8/2025 8:58 PM by Dr. Keita  YAS Arvizu on Workstation: [id number]       XR Chest 1 View [[id number]] Collected: 06/08/25 1740     Updated: 06/08/25 1746    Narrative:      XR CHEST 1 VW-        INDICATION: Shortness of air     COMPARISON: Chest radiograph June 4, 2025     TECHNIQUE: 1 view chest     FINDINGS:      Calcified pulmonary nodules in the left lower lung, consistent with  prior granulomatous infection. Linear opacities at the left lung base.  No effusions. Enlarged cardiac silhouette. Calcified mediastinal and  left hilar lymph nodes, consistent with prior granulomatous infection.  Old right-sided rib fractures. Left humeral head anchor and suspected  old Hill-Sachs deformity. Right glenohumeral arthropathy, incompletely  imaged. Thoracic dextrocurvature.       Impression:         1. Stable chest.  2. Cardiomegaly.  3. Subsegmental atelectasis or scarring suspected at the left lung base  4. Old right rib cage trauma     This report was finalized on 6/8/2025 5:43 PM by Dr. Curly Cee M.D  on Workstation: [id number]                  I reviewed the patient's new clinical results / labs / tests / procedures      Assessment/Plan     Active Hospital Problems    Diagnosis  POA    **Shortness of breath [R06.02]  Yes    Rhinovirus infection [B34.8]  Yes    Weakness [R53.1]  Yes    Falls [R29.6]  Not Applicable    A-fib [I48.91]  Yes    Essential hypertension [I10]  Yes    Acute on chronic diastolic CHF (congestive heart failure) [I50.33]  Yes    Hypothyroidism (acquired) [E03.9]  Yes    Hyperlipidemia [E78.5]  Yes    Acute kidney failure [N17.9]  Yes    Chronic kidney failure, stage 3 (moderate) [N18.30]  Yes    Anemia [D64.9]  Yes    Mitral regurgitation [I34.0]  Yes      Resolved Hospital Problems   No resolved problems to display.           Dyspnea secondary to rhinovirus infection and acute on chronic diastolic congestive heart failure in a patient with a pulmonary hypertension.  Look below for management.  Initiate  oxygen.  Rhinovirus infection.  Chest x-ray without infiltrates.  No fever or leukocytosis.  Will check procalcitonin.  Supportive treatment with Mucinex and DuoNebs.  Acute on chronic diastolic congestive heart failure in a patient with a history of hypertension/MR/A-fib.  Patient denies any chest pain.  Volume status appears to be overloaded.  Troponin is elevated but plateaued with negative delta.  proBNP is elevated.  Patient last echo on 3/2025 revealing a normal ejection fraction with biatrial enlargement and right ventricular enlargement and severe mitral regurgitation and pulmonary hypertension.  Blood pressure was initially uncontrolled and now is controlled with continuation and resumption of atenolol/Cardura/losartan/Aldactone.  P.o. Lasix DC'd and currently on IV Lasix.  Awaiting cardiology consult.  Poor candidate for mitral valve repair given her age and medical status.  Weakness and falls.  CT scan of the brain and the neck is negative.  CNS examination is negative.  Musculoskeletal examination is unremarkable.  Potassium is normal.  Will check magnesium/CK/TSH.  Consult PT and OT.  Hypothyroidism.  Continue current replacement check TSH.  History of dyslipidemia.  Not on treatment.  Check lipid profile.  History of colon cancer.  Asymptomatic with benign GI examination.  Given the patient age and general condition were not pursued.  Acute on top of stage IIIa chronic renal failure.  Baseline creatinine between 1.1 and 1.4.  Volume status is elevated.  Will check urine electrolytes/bladder scan/uric acid.  Mild chronic anemia.  Baseline hemoglobin between 11 and 13.  Hemoglobin at baseline.  Will monitor.  History of fibromyalgia/degenerative disc disease.  Tylenol.  VTE prophylaxis.  Subcu heparin.      Discussed my findings and plan of treatment with the patient.  Disposition.  Will mostly need SNF.        Noah Coyle MD  [facility]  06/09/25  09:37 EDT

## 2025-06-09 NOTE — THERAPY EVALUATION
Patient Name: Inge Borjas  : 1932    MRN: [id number]                              Today's Date: 2025       Admit Date: 2025    Visit Dx:     ICD-10-CM ICD-9-CM   1. Shortness of breath  R06.02 786.05   2. Frequent falls  R29.6 V15.88   3. History of atrial fibrillation  Z86.79 V12.59   4. History of chronic CHF  Z86.79 V12.59   5. Acute hypoxic respiratory failure  J96.01 518.81   6. Rhinovirus infection  B34.8 079.3     Patient Active Problem List   Diagnosis    Trigeminal neuralgia    Malignant neoplasm of sigmoid colon    COLVIN (dyspnea on exertion)    Unstable angina    Dyspnea on exertion    Nonrheumatic tricuspid valve regurgitation    Mitral regurgitation    Shortness of breath    Rhinovirus infection    Weakness    Falls    A-fib    Essential hypertension    Acute on chronic diastolic CHF (congestive heart failure)    Hypothyroidism (acquired)    Hyperlipidemia    Acute kidney failure    Chronic kidney failure, stage 3 (moderate)    Anemia     Past Medical History:   Diagnosis Date    Anemia     Arthritis     Atrial fibrillation     Intermittently    Colon cancer     Stage III    Disease of thyroid gland     hypothyroid    Drug therapy 2012    pill for 6 months    H/O Herpes simplex     virus of the lip    Hyperlipidemia     Hypertension     Intervertebral disk disease     Polio     Trigeminal neuralgia      Past Surgical History:   Procedure Laterality Date    ARTHROSCOPY SHOULDER W/ OPEN ROTATOR CUFF REPAIR      BREAST BIOPSY Left     at least 25 years ago.    CARDIAC CATHETERIZATION  3/25/2025    Procedure: Right and Left Heart Cath;  Surgeon: Mukul Bloom MD;  Location:  [facility];  Service: Cardiology;;    CARDIAC CATHETERIZATION N/A 3/25/2025    Procedure: Coronary angiography;  Surgeon: Mukul Bloom MD;  Location:  [facility];  Service: Cardiology;  Laterality: N/A;    COLON RESECTION  2015    JOINT REPLACEMENT       Elbow transplant and shoulder fusion    TONSILLECTOMY        General Information       Row Name 06/09/25 1433          OT Time and Intention    Document Type evaluation  -PP     Mode of Treatment individual therapy;occupational therapy  -PP       Row Name 06/09/25 1433          General Information    Patient Profile Reviewed yes  -PP     Prior Level of Function independent:;ADL's;all household mobility  Patient reports no using AD but has h/o falls  -PP     Existing Precautions/Restrictions fall  -PP     Barriers to Rehab none identified  -PP       Row Name 06/09/25 1433          Living Environment    Current Living Arrangements home  -PP     People in Home alone  -PP       Row Name 06/09/25 1433          Stairs Within Home, Primary    Stairs, Within Home, Primary basement laundry  -PP       Row Name 06/09/25 1433          Cognition    Orientation Status (Cognition) oriented x 3  -PP       Row Name 06/09/25 1433          Safety Issues/Impairments Affecting Functional Mobility    Impairments Affecting Function (Mobility) strength;endurance/activity tolerance  -PP     Comment, Safety Issues/Impairments (Mobility) gait belt and non skid socks worn for safety  -PP               User Key  (r) = Recorded By, (t) = Taken By, (c) = Cosigned By      Initials Name Provider Type    PP Sonam Day OT Occupational Therapist                     Mobility/ADL's       Row Name 06/09/25 1434          Bed Mobility    Bed Mobility supine-sit  -PP     Supine-Sit [place] (Bed Mobility) verbal cues;standby assist  -PP     Assistive Device (Bed Mobility) head of bed elevated  -PP     Comment, (Bed Mobility) Pt UIC at session end  -PP       Row Name 06/09/25 1434          Transfers    Transfers sit-stand transfer;bed-chair transfer  -PP       Row Name 06/09/25 1434          Bed-Chair Transfer    Bed-Chair [place] (Transfers) contact guard  -PP     Comment, (Bed-Chair Transfer) no AD, ambulating to chair  -PP       Row Name  06/09/25 1434          Sit-Stand Transfer    Sit-Stand [place] (Transfers) verbal cues;contact guard  -PP     Assistive Device (Sit-Stand Transfers) other (see comments)  HHA  -PP     Comment, (Sit-Stand Transfer) from EOB 2x  -PP       Row Name 06/09/25 1434          Activities of Daily Living    BADL Assessment/Intervention lower body dressing;grooming;feeding;toileting  -PP       Row Name 06/09/25 1434          Self-Feeding Assessment/Training    [place] Level (Feeding) feeding skills;independent  -PP       Row Name 06/09/25 [facility]          Lower Body Dressing Assessment/Training    [place] Level (Lower Body Dressing) doff;don;socks;maximum assist (25% patient effort)  -PP     Position (Lower Body Dressing) sitting up in bed  -PP     Comment, (Lower Body Dressing) limited use of RUE d/t childhood polio  -PP       Row Name 06/09/25 1434          Grooming Assessment/Training    [place] Level (Grooming) oral care regimen;wash face, hands;set up  -PP     Position (Grooming) edge of bed sitting  -PP       Row Name 06/09/25 1434          Toileting Assessment/Training    Comment, (Toileting) brief present but patient able to perform to BR with Ax1  -PP               User Key  (r) = Recorded By, (t) = Taken By, (c) = Cosigned By      Initials Name Provider Type    PP Sonam Day OT Occupational Therapist                   Obj/Interventions       Row Name 06/09/25 1438          Sensory Assessment (Somatosensory)    Sensory Assessment (Somatosensory) UE sensation intact  -PP       Row Name 06/09/25 1438          Vision Assessment/Intervention    Visual Impairment/Limitations WFL  -PP       Row Name 06/09/25 1438          Range of Motion Comprehensive    General Range of Motion other (see comments)  -PP     Comment, General Range of Motion limited R UE ROM, hx of childhood polio  -PP       Row Name 06/09/25 1438          Strength Comprehensive (MMT)    General Manual Muscle Testing (MMT) Assessment  upper extremity strength deficits identified  -PP     Comment, General Manual Muscle Testing (MMT) Assessment Gen weakness noted  -PP       Row Name 06/09/25 1438          Motor Skills    Motor Skills functional endurance  -PP     Functional Endurance fair  -PP       Row Name 06/09/25 1438          Balance    Balance Assessment sitting static balance;standing static balance;standing dynamic balance  -PP     Static Sitting Balance supervision  -PP     Position, Sitting Balance sitting edge of bed  -PP     Static Standing Balance contact guard  -PP     Dynamic Standing Balance contact guard  -PP               User Key  (r) = Recorded By, (t) = Taken By, (c) = Cosigned By      Initials Name Provider Type    PP Barb, Sonam, OT Occupational Therapist                   Goals/Plan       Row Name 06/09/25 1445          Bed Mobility Goal 1 (OT)    Activity/Assistive Device (Bed Mobility Goal 1, OT) bed mobility activities, all  -PP     [place] Level/Cues Needed (Bed Mobility Goal 1, OT) modified independence  -PP     Time Frame (Bed Mobility Goal 1, OT) short term goal (STG);2 weeks  -PP       Row Name 06/09/25 1445          Transfer Goal 1 (OT)    Activity/Assistive Device (Transfer Goal 1, OT) transfers, all  -PP     [place] Level/Cues Needed (Transfer Goal 1, OT) modified independence  -PP     Time Frame (Transfer Goal 1, OT) short term goal (STG);2 weeks  -PP     Progress/Outcome (Transfer Goal 1, OT) new goal  -PP       Row Name 06/09/25 1445          Dressing Goal 1 (OT)    Activity/Device (Dressing Goal 1, OT) dressing skills, all;upper body dressing;lower body dressing  -PP     [place]/Cues Needed (Dressing Goal 1, OT) modified independence  -PP     Time Frame (Dressing Goal 1, OT) short term goal (STG);2 weeks  -PP     Progress/Outcome (Dressing Goal 1, OT) new goal  -PP       Row Name 06/09/25 1445          Toileting Goal 1 (OT)    Activity/Device (Toileting Goal 1, OT) toileting skills, all   -PP     [place] Level/Cues Needed (Toileting Goal 1, OT) modified independence  -PP     Time Frame (Toileting Goal 1, OT) short term goal (STG);2 weeks  -PP     Progress/Outcome (Toileting Goal 1, OT) new goal  -PP       Row Name 06/09/25 1445          Grooming Goal 1 (OT)    Activity/Device (Grooming Goal 1, OT) grooming skills, all  -PP     [place] (Grooming Goal 1, OT) modified independence  -PP     Time Frame (Grooming Goal 1, OT) short term goal (STG);2 weeks  -PP     Progress/Outcome (Grooming Goal 1, OT) new goal  -PP       Row Name 06/09/25 1445          Therapy Assessment/Plan (OT)    Planned Therapy Interventions (OT) activity tolerance training;BADL retraining;functional balance retraining;occupation/activity based interventions;patient/caregiver education/training;strengthening exercise;transfer/mobility retraining  -PP               User Key  (r) = Recorded By, (t) = Taken By, (c) = Cosigned By      Initials Name Provider Type    PP Sonam Day, LOUISE Occupational Therapist                   Clinical Impression       Row Name 06/09/25 1439          Pain Assessment    Pretreatment Pain Rating 0/10 - no pain  -PP     Posttreatment Pain Rating 0/10 - no pain  -PP       Row Name 06/09/25 1439          Plan of Care Review    Plan of Care Reviewed With patient  -PP     Progress improving  -PP     Outcome Evaluation Patient is a 89 y/o female admitted after a fall at home and SOA. Her  tried to help her up, but apparently suffered cardiac arrest and passed away here at [facility]. PMHx includes A fib, CKD, HTN, anemia, and CHF.  At baseline, pt lived with , and reports being (I) for ADLs and mob w/o AD. However, h/o freq falls. Today, pt presents to OT Eval with ADLs performance below baseline with generalized weakness and decreased endurance/ act tolerance. She is on 2L O2 during evaluation. Pt able to transition to EOB w/ SBA. She stood w CGA/HHA and ambulated in room w/ CGA/HHA. No  unsteadiness noted but limited use of RUE for fxnl tasks d/t h/o polio. Pt agreeable to sit up in recliner at end of session. Health surrogate present and requesting CCP. Per chart plans for pt to dc to senior living. Possibly with in home therapy services. IPOT will continue to monitor to maximize patient safety and (I) w/ ADLs and address GOC.  -PP       Row Name 06/09/25 1439          Therapy Assessment/Plan (OT)    Rehab Potential (OT) good  -PP     Criteria for Skilled Therapeutic Interventions Met (OT) yes;skilled treatment is necessary  -PP     Therapy Frequency (OT) 5 times/wk  -PP       Row Name 06/09/25 1439          Therapy Plan Review/Discharge Plan (OT)    Anticipated Discharge Disposition (OT) assisted living;other (see comments);home with home health  therapy services  -PP       Row Name 06/09/25 1439          Vital Signs    Pre SpO2 (%) 99  -PP     O2 Delivery Pre Treatment room air  -PP     O2 Delivery Intra Treatment room air  -PP     O2 Delivery Post Treatment room air  -PP       Row Name 06/09/25 1439          Positioning and Restraints    Pre-Treatment Position in bed  -PP     Post Treatment Position chair  -PP     In Chair notified nsg;reclined;encouraged to call for assist;call light within reach;exit alarm on;with family/caregiver  -PP               User Key  (r) = Recorded By, (t) = Taken By, (c) = Cosigned By      Initials Name Provider Type    PP Sonam Day, LOUISE Occupational Therapist                   Outcome Measures       Row Name 06/09/25 1445          How much help from another is currently needed...    Putting on and taking off regular lower body clothing? 2  -PP     Bathing (including washing, rinsing, and drying) 2  -PP     Toileting (which includes using toilet bed pan or urinal) 3  -PP     Putting on and taking off regular upper body clothing 3  -PP     Taking care of personal grooming (such as brushing teeth) 3  -PP     Eating meals 4  -PP     AM-PAC 6 Clicks Score (OT) 17  -PP        Row Name 06/09/25 1132 06/09/25 0800       How much help from another person do you currently need...    Turning from your back to your side while in flat bed without using bedrails? 4  -EJ 3  -HS    Moving from lying on back to sitting on the side of a flat bed without bedrails? 4  -EJ 3  -HS    Moving to and from a bed to a chair (including a wheelchair)? 3  -EJ 3  -HS    Standing up from a chair using your arms (e.g., wheelchair, bedside chair)? 3  -EJ 3  -HS    Climbing 3-5 steps with a railing? 3  -EJ 2  -HS    To walk in hospital room? 3  -EJ 3  -HS    AM-PAC 6 Clicks Score (PT) 20  -EJ 17  -HS      Row Name 06/09/25 1445          Functional Assessment    Outcome Measure Options AM-PAC 6 Clicks Daily Activity (OT)  -PP               User Key  (r) = Recorded By, (t) = Taken By, (c) = Cosigned By      Initials Name Provider Type     Radha Suarez, PT Physical Therapist    HS Gudelia Dorantes, RN Registered Nurse    PP Sonam Day OT Occupational Therapist                    Occupational Therapy Education       Title: PT OT SLP Therapies (In Progress)       Topic: Occupational Therapy (In Progress)       Point: ADL training (Done)       Learning Progress Summary            Patient Acceptance, E, VU by PP at 6/9/2025 1446    Comment: Pt Ed on OT role, call light function and dc planning.                                      User Key       Initials Effective Dates Name Provider Type Discipline    PP 06/09/23 -  Sonam Day OT Occupational Therapist OT                  OT Recommendation and Plan  Planned Therapy Interventions (OT): activity tolerance training, BADL retraining, functional balance retraining, occupation/activity based interventions, patient/caregiver education/training, strengthening exercise, transfer/mobility retraining  Therapy Frequency (OT): 5 times/wk  Plan of Care Review  Plan of Care Reviewed With: patient  Progress: improving  Outcome Evaluation: Patient is a 89  y/o female admitted after a fall at home and SOA. Her  tried to help her up, but apparently suffered cardiac arrest and passed away here at [facility]. PMHx includes A fib, CKD, HTN, anemia, and CHF.  At baseline, pt lived with , and reports being (I) for ADLs and mob w/o AD. However, h/o freq falls. Today, pt presents to OT Eval with ADLs performance below baseline with generalized weakness and decreased endurance/ act tolerance. She is on 2L O2 during evaluation. Pt able to transition to EOB w/ SBA. She stood w CGA/HHA and ambulated in room w/ CGA/HHA. No unsteadiness noted but limited use of RUE for fxnl tasks d/t h/o polio. Pt agreeable to sit up in recliner at end of session. Health surrogate present and requesting CCP. Per chart plans for pt to dc to [facility]. Possibly with in home therapy services. IPOT will continue to monitor to maximize patient safety and (I) w/ ADLs and address GOC.     Time Calculation:   Evaluation Complexity (OT)  Review Occupational Profile/Medical/Therapy History Complexity: brief/low complexity  Assessment, Occupational Performance/Identification of Deficit Complexity: 1-3 performance deficits  Clinical Decision Making Complexity (OT): problem focused assessment/low complexity  Overall Complexity of Evaluation (OT): low complexity     Time Calculation- OT       Row Name 06/09/25 1431             Time Calculation- OT    OT Start Time 1054  -PP      OT Stop Time 1110  -PP      OT Time Calculation (min) 16 min  -PP      Total Timed Code Minutes- OT 8 minute(s)  -PP      OT Received On 06/09/25  -PP      OT - Next Appointment 06/10/25  -PP      OT Goal Re-Cert Due Date 06/23/25  -PP         Timed Charges    05459 - OT Therapeutic Activity Minutes 8  -PP         Untimed Charges    OT Eval/Re-eval Minutes 8  -PP         Total Minutes    Timed Charges Total Minutes 8  -PP      Untimed Charges Total Minutes 8  -PP       Total Minutes 16  -PP                User Key  (r) = Recorded By, (t)  = Taken By, (c) = Cosigned By      Initials Name Provider Type    PP Sonam Day, OT Occupational Therapist                  Therapy Charges for Today       Code Description Service Date Service Provider Modifiers Qty    [id number]  OT THERAPEUTIC ACT EA 15 MIN 6/9/2025 Sonam Day, OT GO 1    [id number]  OT EVAL LOW COMPLEXITY 3 6/9/2025 Sonam Day OT GO 1                 Sonam Day OT  6/9/2025

## 2025-06-09 NOTE — ED NOTES
Nursing report ED to floor  Inge Borjas  89 y.o.  female    HPI :  HPI  Stated Reason for Visit: From home with c/o several recent falls, soa.   passed away today  History Obtained From: patient    Chief Complaint  Chief Complaint   Patient presents with    Shortness of Breath    Fall       Admitting doctor:   Harper Jacques MD    Admitting diagnosis:   The primary encounter diagnosis was Shortness of breath. Diagnoses of Frequent falls, History of atrial fibrillation, History of chronic CHF, Acute hypoxic respiratory failure, and Rhinovirus infection were also pertinent to this visit.    Code status:   Current Code Status       Date Active Code Status Order ID Comments User Context       Not on file            Allergies:   Cardizem [diltiazem hcl] and Amoxicillin    Isolation:   Droplet    Intake and Output  No intake or output data in the 24 hours ending 06/08/25 2036    Weight:   There were no vitals filed for this visit.    Most recent vitals:   Vitals:    06/08/25 1704 06/08/25 1706 06/08/25 1830 06/08/25 1945   BP:  (!) 154/104 165/99    BP Location:  Left arm     Patient Position:  Sitting     Pulse: 78  87 96   Resp: 16      Temp: 98 °F (36.7 °C)      SpO2: 94%  91% (!) 87%       Active LDAs/IV Access:   Lines, Drains & Airways       Active LDAs       Name Placement date Placement time Site Days    Peripheral IV 06/08/25 1819 20 G Left Antecubital 06/08/25 1819  Antecubital  less than 1                    Labs (abnormal labs have a star):   Labs Reviewed   RESPIRATORY PANEL PCR W/ COVID-19 (SARS-COV-2), NP SWAB IN UTM/VTP, 2 HR TAT - Abnormal; Notable for the following components:       Result Value    Human Rhinovirus/Enterovirus Detected (*)     All other components within normal limits    Narrative:     In the setting of a positive respiratory panel with a viral infection PLUS a negative procalcitonin without other underlying concern for bacterial infection, consider observing off  antibiotics or discontinuation of antibiotics and continue supportive care. If the respiratory panel is positive for atypical bacterial infection (Bordetella pertussis, Chlamydophila pneumoniae, or Mycoplasma pneumoniae), consider antibiotic de-escalation to target atypical bacterial infection.   COMPREHENSIVE METABOLIC PANEL - Abnormal; Notable for the following components:    BUN 47.0 (*)     Creatinine 1.63 (*)     Alkaline Phosphatase 124 (*)     BUN/Creatinine Ratio 28.8 (*)     eGFR 29.5 (*)     All other components within normal limits    Narrative:     GFR Categories in Chronic Kidney Disease (CKD)              GFR Category          GFR (mL/min/1.73)    Interpretation  G1                    90 or greater        Normal or high (1)  G2                    60-89                Mild decrease (1)  G3a                   45-59                Mild to moderate decrease  G3b                   30-44                Moderate to severe decrease  G4                    15-29                Severe decrease  G5                    14 or less           Kidney failure    (1)In the absence of evidence of kidney disease, neither GFR category G1 or G2 fulfill the criteria for CKD.    eGFR calculation 2021 CKD-EPI creatinine equation, which does not include race as a factor   BNP (IN-HOUSE) - Abnormal; Notable for the following components:    proBNP 6,285.0 (*)     All other components within normal limits    Narrative:     This assay is used as an aid in the diagnosis of individuals suspected of having heart failure. It can be used as an aid in the diagnosis of acute decompensated heart failure (ADHF) in patients presenting with signs and symptoms of ADHF to the emergency department (ED). In addition, NT-proBNP of <300 pg/mL indicates ADHF is not likely.    Age Range Result Interpretation  NT-proBNP Concentration (pg/mL:      <50             Positive            >450                   Gray                 300-450                     Negative             <300    50-75           Positive            >900                  Gray                300-900                  Negative            <300      >75             Positive            >1800                  Gray                300-1800                  Negative            <300   TROPONIN - Abnormal; Notable for the following components:    HS Troponin T 21 (*)     All other components within normal limits    Narrative:     High Sensitive Troponin T Reference Range:  <14.0 ng/L- Negative Female for AMI  <22.0 ng/L- Negative Male for AMI  >=14 - Abnormal Female indicating possible myocardial injury.  >=22 - Abnormal Male indicating possible myocardial injury.   Clinicians would have to utilize clinical acumen, EKG, Troponin, and serial changes to determine if it is an Acute Myocardial Infarction or myocardial injury due to an underlying chronic condition.        CBC WITH AUTO DIFFERENTIAL - Abnormal; Notable for the following components:    RBC 3.75 (*)     Hemoglobin 11.9 (*)     Neutrophil % 77.1 (*)     Lymphocyte % 11.6 (*)     Immature Grans % 1.7 (*)     Lymphocytes, Absolute 0.69 (*)     Immature Grans, Absolute 0.10 (*)     All other components within normal limits   HIGH SENSITIVITIY TROPONIN T 1HR - Abnormal; Notable for the following components:    HS Troponin T 20 (*)     All other components within normal limits    Narrative:     High Sensitive Troponin T Reference Range:  <14.0 ng/L- Negative Female for AMI  <22.0 ng/L- Negative Male for AMI  >=14 - Abnormal Female indicating possible myocardial injury.  >=22 - Abnormal Male indicating possible myocardial injury.   Clinicians would have to utilize clinical acumen, EKG, Troponin, and serial changes to determine if it is an Acute Myocardial Infarction or myocardial injury due to an underlying chronic condition.        CARBAMAZEPINE LEVEL, TOTAL - Normal   RAINBOW DRAW    Narrative:     The following orders were created for panel order [place]  Draw.  Procedure                               Abnormality         Status                     ---------                               -----------         ------                     Green Top (Gel)[[id number]]                                  Final result               Lavender Top[[id number]]                                     Final result               Gold Top - SST[[id number]]                                   Final result               Light Blue Top[[id number]]                                   Final result                 Please view results for these tests on the individual orders.   CBC AND DIFFERENTIAL    Narrative:     The following orders were created for panel order CBC & Differential.  Procedure                               Abnormality         Status                     ---------                               -----------         ------                     CBC Auto Differential[[id number]]        Abnormal            Final result                 Please view results for these tests on the individual orders.   GREEN TOP   LAVENDER TOP   GOLD TOP - SST   LIGHT BLUE TOP       EKG:   ECG 12 Lead ED Triage Standing Order; SOA   Preliminary Result   HEART RATE=92  bpm   RR Lozfstnw=673  ms   NM Interval=  ms   P Horizontal Axis=  deg   P Front Axis=  deg   QRSD Interval=87  ms   QT Rjueogft=466  ms   RIcN=330  ms   QRS Axis=60  deg   T Wave Axis=48  deg   - ABNORMAL ECG -   Atrial fibrillation   Ventricular premature complex   Abnormal R-wave progression, early transition   Minimal ST depression, inferior leads   When compared with ECG of 04-Jun-2025 16:00:42,   No significant change   Date and Time of Study:2025-06-08 17:12:52          Meds given in ED:   Medications   sodium chloride 0.9 % flush 10 mL (has no administration in time range)       Imaging results:  XR Chest 1 View  Result Date: 6/8/2025   1. Stable chest. 2. Cardiomegaly. 3. Subsegmental atelectasis or scarring suspected at the left lung base 4. Old  right rib cage trauma  This report was finalized on 6/8/2025 5:43 PM by Dr. Curly Cee M.D on Workstation: [id number]        Ambulatory status:   - standby    Social issues:   Social History     Socioeconomic History    Marital status:      Spouse name: Burt   Tobacco Use    Smoking status: Never    Smokeless tobacco: Never   Vaping Use    Vaping status: Never Used   Substance and Sexual Activity    Alcohol use: No    Drug use: No    Sexual activity: Defer     Partners: Male       Peripheral Neurovascular  Peripheral Neurovascular (Adult)  Peripheral Neurovascular WDL: WDL    Neuro Cognitive  Neuro Cognitive (Adult)  Cognitive/Neuro/Behavioral WDL: WDL    Learning  Learning Assessment  Learning Readiness and Ability: no barriers identified    Respiratory  Respiratory  Airway WDL: WDL  Respiratory WDL  Respiratory WDL: .WDL except, cough, all  Rhythm/Pattern, Respiratory: shortness of breath    Abdominal Pain       Pain Assessments  Pain (Adult)  (0-10) Pain Rating: Rest: 0    NIH Stroke Scale       Mukul Chavarria RN  06/08/25 20:36 EDT

## 2025-06-09 NOTE — PLAN OF CARE
Goal Outcome Evaluation:  Plan of Care Reviewed With: patient        Progress: improving  Outcome Evaluation: Patient is a 89 y/o female admitted after a fall at home and SOA. Her  tried to help her up, but apparently suffered cardiac arrest and passed away here at [facility]. PMHx includes A fib, CKD, HTN, anemia, and CHF.  At baseline, pt lived with , and reports being (I) for ADLs and mob w/o AD. However, h/o freq falls. Today, pt presents to OT Eval with ADLs performance below baseline with generalized weakness and decreased endurance/ act tolerance. She is on 2L O2 during evaluation. Pt able to transition to EOB w/ SBA. She stood w CGA/HHA and ambulated in room w/ CGA/HHA. No unsteadiness noted but limited use of RUE for fxnl tasks d/t h/o polio. Pt agreeable to sit up in recliner at end of session. Health surrogate present and requesting CCP. Per chart plans for pt to dc to [facility]. Possibly with in home therapy services. IPOT will continue to monitor to maximize patient safety and (I) w/ ADLs and address GOC.    Anticipated Discharge Disposition (OT): assisted living, other (see comments), home with home health (therapy services)

## 2025-06-09 NOTE — CASE MANAGEMENT/SOCIAL WORK
Discharge Planning Assessment  [facility]     Patient Name: Inge Borajs  MRN: [id number]  Today's Date: 6/9/2025    Admit Date: 6/8/2025    Plan: SNF vs home, may need transportation   Discharge Needs Assessment       Row Name 06/09/25 1404       Living Environment    People in Home alone    Current Living Arrangements home    Potentially Unsafe Housing Conditions none    In the past 12 months has the electric, gas, oil, or water company threatened to shut off services in your home? No    Primary Care Provided by self    Provides Primary Care For no one, unable/limited ability to care for self    Family Caregiver if Needed none;other (see comments)  Precious brower, [facility] [phone]    Quality of Family Relationships helpful;involved    Able to Return to Prior Arrangements other (see comments)  SNF       Resource/Environmental Concerns    Resource/Environmental Concerns none    Transportation Concerns none       Transportation Needs    In the past 12 months, has lack of transportation kept you from medical appointments or from getting medications? no    In the past 12 months, has lack of transportation kept you from meetings, work, or from getting things needed for daily living? No       Food Insecurity    Within the past 12 months, you worried that your food would run out before you got the money to buy more. Never true    Within the past 12 months, the food you bought just didn't last and you didn't have money to get more. Never true       Transition Planning    Patient/Family Anticipates Transition to other (see comments)  SNF    Patient/Family Anticipated Services at Transition     Transportation Anticipated family or friend will provide;health plan transportation       Discharge Needs Assessment    Readmission Within the Last 30 Days no previous admission in last 30 days    Current Outpatient/Agency/Support Group skilled nursing facility    Equipment Currently Used at Home bath bench;walker,  standard;grab bar    Concerns to be Addressed discharge planning    Do you want help finding or keeping work or a job? I do not need or want help    Do you want help with school or training? For example, starting or completing job training or getting a high school diploma, GED or equivalent No    Anticipated Changes Related to Illness inability to care for self    Equipment Needed After Discharge none    Outpatient/Agency/Support Group Needs skilled nursing facility    Discharge Facility/Level of Care Needs nursing facility, skilled                   Discharge Plan       Row Name 25 1406       Plan    Plan SNF vs home, may need transportation    Patient/Family in Agreement with Plan yes    Plan Comments Met with pt and Precious, [facility] at bedside. Permission obtained to speak to pt in front of her HCS. Pt does not have any children. Verified face sheet and PCP is Angelica Anthony. Pt denies any difficulty paying for medications, and she obtains her medications from [email or web address]. Pt is currently alone, as her  recently . Her HCS stated that she has had several falls recently and would need some short term rehab, she would be interested in moving her into  or [facility]. Several resources left at the bedside for HCS to assist with future planning. Precious requested a referral to [facility]. Referral placed in Epic, Bee V/informed. Pt will need transportation arranged. Explained that CCP would follow to assess for discharge needs.                  Continued Care and Services - Admitted Since 2025       Destination       Service Provider Request Status Services Address Phone Fax Patient Preferred    [facility] Pending - Request Sent -- [facility] [id number] [phone] --                  Selected Continued Care - Prior Encounters Includes continued care and service providers with selected services from prior encounters from  3/10/2025 to 6/9/2025      Discharged on 3/26/2025 Admission date: 3/25/2025 - Discharge disposition: Home or Self Care      Home Medical Care       Service Provider Services Address Phone Fax Patient Preferred    [facility] [address] [facility] [address] [address] [phone] --                          Expected Discharge Date and Time       Expected Discharge Date Expected Discharge Time    Jun 11, 2025            Demographic Summary    No documentation.                  Functional Status    No documentation.                  Psychosocial    No documentation.                  Abuse/Neglect    No documentation.                  Legal    No documentation.                  Substance Abuse    No documentation.                  Patient Forms    No documentation.                     Lor Coombs, RN

## 2025-06-09 NOTE — CONSULTS
Patient Name: Inge Borjas  Age/Sex: 89 y.o. female  : 1932  MRN: [id number]    Date of Admission: 2025  Date of Encounter Visit: 06/10/25  Encounter Provider: Mukul Bloom MD  Place of Service: [facility]      Referring Provider: Harper Jacques MD  Patient Care Team:  Angelica Anthony MD as PCP - General  Angelica Anthony MD as PCP - Family Medicine  Inge Jack MD as Referring Physician (General Surgery)  Len Escobar MD as Consulting Physician (Hematology and Oncology)    Subjective:     Admitted/Consulted for: Heart Failure, MR     Chief Complaint:  Shortness of Breath and Fall       History of Present Illness:  Inge Borjas is a 89 y.o. female  with a medical history chronic atrial fibrillation, colon cancer, frailty, essential hypertension and COLVIN.       She underwent a transthoracic echocardiogram on 3-12-25 that showed severe mitral and tricuspid valve regurgitation.  She had an elevated RVSP on the echo greater than 55 mmHg.  She underwent coronary angiography along with right heart catheterization on 3-25-25.  The PA pressure was 53/23/37 with a mildly elevated wedge pressure.  Coronary angiography showed a  normal left main, diffuse 20-30% proximal LAD stenosis and discrete 30-40% mid vessel stenosis with mid septal  branch with 99% stenosis, LCX has diffuse 20-30% mid vessel stenosis.  We had a discussion about MitraClip, however, she did not want additional cardiac procedures.  She was seen in the office on 25 and continued to have significant dyspnea on exertion.  She again stated she did not want invasive procedures.  Lasix was increased to 40 mg daily x 3 days.  She is also maintained on a baby aspirin, atenolol 100 mg, doxazosin 4 mg, Lasix 20 mg and losartan 25 mg.    She presented to the ED on 25 with shortness of breath, weakness and multiple falls.  She states she has had several fall  since Wednesday.  This morning she fell and her  tried to pick her up and fell himself, subsequently coding and ultimately dying here in our ED.  Due to her shortness of breath, her neighbors convinced her to stay in the ED for further workup.   Initial blood pressure was 154/104.      Work up shows mild elevated and flat troponin's, BNP 6,285, sodium 136, potassium 5.2, creatine 1.63, TSH 13.1, hemoglobin 11.9.  PCR is positive for Rhinovirus.  CT of the chest suggest congestion.  CT of head, cervical spine an chest showed no injury.      She currently is on 2 liters of oxygen.  Home medications have been resumed.  She was also placed on IV Lasix.    Previous testing:     ECHO 3-12-25    Left ventricular systolic function is normal. Left ventricular ejection fraction appears to be 61 - 65%.    Left ventricular diastolic function was indeterminate.    Moderately reduced right ventricular systolic function noted.    The right ventricular cavity is moderately dilated.    The left atrial cavity is moderately dilated.    The right atrial cavity is moderately  dilated.    There is mild calcification of the aortic valve.    There is bileaflet mitral valve thickening present.    Severe mitral valve regurgitation is present.    Severe tricuspid valve regurgitation is present.    Estimated right ventricular systolic pressure from tricuspid regurgitation is markedly elevated (>55 mmHg).       CARDIAC CATH 3-25-25  1. Left main: Normal  2. LAD: Diffuse 20 to 30% proximal stenosis.  Discrete 30 to 40% mid vessel stenosis.  Mid septal  branch with discrete 99% stenosis  3. LCX: Diffuse 20 to 30% mid vessel stenosis  4. RCA: Luminal irregularities proximal segment.  5.  Mildly elevated right atrial and wedge pressure.  Large V wave on wedge pressure.  6.  Moderate pulmonary hypertension    24 HOUR HOLTER 8-31-21  An abnormal monitor study.  Atrial fibrillation is present throughout  Average heart rate 62 bpm,  range  bpm  Maximal R-R interval 2.9 seconds at 8:13 AM      Past Medical History:  Past Medical History:   Diagnosis Date    Anemia     Arthritis     Atrial fibrillation     Intermittently    Colon cancer 2012    Stage III    Disease of thyroid gland     hypothyroid    Drug therapy 2012    pill for 6 months    H/O Herpes simplex     virus of the lip    Hyperlipidemia     Hypertension     Intervertebral disk disease     Polio     Trigeminal neuralgia        Past Surgical History:   Procedure Laterality Date    ARTHROSCOPY SHOULDER W/ OPEN ROTATOR CUFF REPAIR      BREAST BIOPSY Left     at least 25 years ago.    CARDIAC CATHETERIZATION  3/25/2025    Procedure: Right and Left Heart Cath;  Surgeon: Mukul Bloom MD;  Location:  [facility];  Service: Cardiology;;    CARDIAC CATHETERIZATION N/A 3/25/2025    Procedure: Coronary angiography;  Surgeon: Mukul Bloom MD;  Location:  [facility];  Service: Cardiology;  Laterality: N/A;    COLON RESECTION  08/2015    JOINT REPLACEMENT  1947    Elbow transplant and shoulder fusion    TONSILLECTOMY         Home Medications:   Medications Prior to Admission   Medication Sig Dispense Refill Last Dose/Taking    acetaminophen (TYLENOL) 650 MG 8 hr tablet Take 1 tablet by mouth Every 8 (Eight) Hours As Needed for Mild Pain.   6/7/2025    aspirin 81 MG EC tablet Take 1 tablet by mouth Daily.   6/8/2025    atenolol (TENORMIN) 100 MG tablet Take 1 tablet (100 mg total) by mouth 2 (Two) Times a Day 240 tablet 1 6/8/2025    brimonidine (ALPHAGAN) 0.2 % ophthalmic solution Administer 1 drop to both eyes 2 (Two) Times a Day.   6/8/2025    calcium carbonate-vitamin d 600-400 MG-UNIT per tablet Take 1 tablet by mouth Daily.   6/8/2025    carBAMazepine XR (TEGretol  XR) 100 MG 12 hr tablet Take 1 tablet by mouth 2 (Two) Times a Day. 180 tablet 4 6/8/2025    dorzolamide (TRUSOPT) 2 % ophthalmic solution 1 drop 3 (Three) Times a Day.    6/8/2025    doxazosin (CARDURA) 4 MG tablet Take 1 tablet by mouth every night at bedtime. 90 tablet 4 6/7/2025    fluticasone (FLONASE) 50 MCG/ACT nasal spray Administer 2 sprays into the nostril(s) as directed by provider Daily.   6/8/2025    furosemide (LASIX) 20 MG tablet Take 1 tablet by mouth daily. 90 tablet 4 6/8/2025    losartan (COZAAR) 25 MG tablet Take 4 tablets by mouth Daily.   6/8/2025    Multiple Vitamins-Minerals (CENTRUM ADULTS PO) Take  by mouth.   6/8/2025    spironolactone (ALDACTONE) 25 MG tablet Take 1 tablet by mouth Daily.   Past Month    Synthroid 50 MCG tablet Take 1 tablet by mouth Every Morning.   6/8/2025       Allergies:  Allergies   Allergen Reactions    Cardizem [Diltiazem Hcl] Unknown - Low Severity    Amoxicillin Rash       Past Social History:  Social History     Socioeconomic History    Marital status:      Spouse name: Burt   Tobacco Use    Smoking status: Never     Passive exposure: Never    Smokeless tobacco: Never   Vaping Use    Vaping status: Never Used   Substance and Sexual Activity    Alcohol use: No    Drug use: No    Sexual activity: Defer     Partners: Male        Past Family History:  Family History   Problem Relation Age of Onset    Colon cancer Father         Late in life    Heart disease Father     No Known Problems Mother          Review of Systems:  All systems reviewed. Pertinent positives identified in HPI. All other systems are negative.         Objective:     Objective:  Temp:  [97.3 °F (36.3 °C)-98.1 °F (36.7 °C)] 97.3 °F (36.3 °C)  Heart Rate:  [46-90] 90  Resp:  [16-20] 16  BP: (108-145)/(68-93) 145/93  No intake or output data in the 24 hours ending 06/10/25 0926    Body mass index is 18.57 kg/m².      06/08/25  2300   Weight: 43.1 kg (95 lb 1.6 oz)           Physical Exam:   Temp:  [97.3 °F (36.3 °C)-98.1 °F (36.7 °C)] 97.3 °F (36.3 °C)  Heart Rate:  [46-90] 90  Resp:  [16-20] 16  BP: (108-145)/(68-93) 145/93  No intake or output data in the 24  "hours ending 06/10/25 0926  Flowsheet Rows      Flowsheet Row First Filed Value   Admission Height 152.4 cm (60\") Documented at 06/08/2025 2300   Admission Weight 43.1 kg (95 lb 1.6 oz) Documented at 06/08/2025 2300            General Appearance:    Alert, cooperative, in no acute distress.  Frail.   Head:    Normocephalic, without obvious abnormality, atraumatic       Neck/Lymph   No adenopathy, supple, no thyromegaly, no carotid bruit, no    JVD   Lungs:   Bilateral rhonchi    Cardiac:    Normal rate, regular rhythm, 3 out of 6 systolic murmur, no rub, no gallop   Chest Wall:    No abnormalities observed   GI:     Normal bowel sounds, soft, nontender, nondistended,            no rebound tenderness   Extremities:   No cyanosis, clubbing, or edema   Circulatory/Peripheral Vascular :   Pulses palpable and equal bilaterally   Integumentary:   No bleeding or rash. Normal temperature              Lab Review:     Results from last 7 days   Lab Units 06/10/25  0405 06/09/25  1756 06/08/25  1715   SODIUM mmol/L 134* 133* 136   POTASSIUM mmol/L 4.4 4.7 5.2   CHLORIDE mmol/L 100 97* 99   CO2 mmol/L 24.8 23.0 26.4   BUN mg/dL 47.0* 49.0* 47.0*   CREATININE mg/dL 1.72* 1.90* 1.63*   GLUCOSE mg/dL 71 100* 93   CALCIUM mg/dL 8.5 9.0 9.6       Results from last 7 days   Lab Units 06/10/25  0405 06/08/25  1817 06/08/25  1715   CK TOTAL U/L 53  --   --    HSTROP T ng/L  --  20* 21*     Results from last 7 days   Lab Units 06/10/25  0405   WBC 10*3/mm3 4.14   HEMOGLOBIN g/dL 10.3*   HEMATOCRIT % 31.0*   PLATELETS 10*3/mm3 108*         Results from last 7 days   Lab Units 06/10/25  0405   CHOLESTEROL mg/dL 132     Results from last 7 days   Lab Units 06/09/25  0958   MAGNESIUM mg/dL 2.4*     Results from last 7 days   Lab Units 06/10/25  0405   CHOLESTEROL mg/dL 132   TRIGLYCERIDES mg/dL 57   HDL CHOL mg/dL 56   LDL CHOL mg/dL 64     Results from last 7 days   Lab Units 06/08/25  1715   PROBNP pg/mL 6,285.0*         Results from last " 7 days   Lab Units 06/08/25  1817   TSH uIU/mL 13.100*       Imaging:    Imaging Results (Most Recent)       Procedure Component Value Units Date/Time    CT Chest Without Contrast Diagnostic [[id number]] Collected: 06/08/25 2102     Updated: 06/08/25 2111    Narrative:      CT OF THE CHEST WITHOUT CONTRAST     HISTORY: Frequent falls. Shortness of breath.     COMPARISON: None available.     TECHNIQUE: Axial CT imaging was obtained through the thorax. No IV  contrast was administered.     FINDINGS:  Old bilateral rib fractures are noted. The patient also has compression  deformity of T10, which was also present on exam from November 14, 2024.  There is thoracic scoliosis, with convexity to the left. There are  advanced degenerative changes of the right shoulder. There is biapical  scarring. Mediastinal lymph nodes do not appear pathologically enlarged.  The heart is enlarged. There is some interlobular septal thickening, as  well as trace right pleural effusion and small left pleural effusion.  Appearance suggests congestive heart failure. There is scarring noted at  the lung bases. The thoracic aorta is normal in caliber. There is  enlargement of the main pulmonary artery, which can be seen in the  setting of pulmonary arterial hypertension. Thyroid gland, trachea, and  esophagus are within normal limits. Images through the upper abdomen  demonstrate a left renal cyst. Patient does appear to have some body  wall edema.       Impression:         1. No acute traumatic injury identified.  2. Cardiomegaly, interlobular septal thickening, and small effusions,  suggesting congestive heart failure.     Radiation dose reduction techniques were utilized, including automated  exposure control and exposure modulation based on body size.        This report was finalized on 6/8/2025 9:08 PM by Dr. Neela Arvizu M.D on Workstation: [id number]       CT Cervical Spine Without Contrast [[id number]] Collected: 06/08/25 2058      Updated: 06/08/25 2105    Narrative:      CT OF THE CERVICAL SPINE     HISTORY: Neck pain. Multiple falls.     COMPARISON: None available.     TECHNIQUE: Axial CT imaging was obtained through the cervical spine.  Coronal and sagittal reformatted images were obtained.     FINDINGS:  No acute fracture or subluxation of the cervical spine is seen. There is  anterolisthesis of C3 on C4 and C4 on C5. There is some retrolisthesis  of C5 on C6. Intervertebral disc space narrowing is most significant at  C5-C6 and C6-C7. There is no prevertebral soft tissue swelling.  Moderately Cini significant canal stenosis at any level. Neuroforaminal  narrowing is most significant on the left at C5-C6.       Impression:      No acute fracture or subluxation identified.     Radiation dose reduction techniques were utilized, including automated  exposure control and exposure modulation based on body size.        This report was finalized on 6/8/2025 9:02 PM by Dr. Neela Arvizu M.D on Workstation: [id number]       CT Head Without Contrast [[id number]] Collected: 06/08/25 2054     Updated: 06/08/25 2101    Narrative:      CT OF THE HEAD WITHOUT CONTRAST     HISTORY: Multiple falls     COMPARISON: June 4, 2025     TECHNIQUE: Axial CT imaging was obtained through the brain. No IV  contrast was administered.     FINDINGS:  No acute intracranial hemorrhage is seen. There is atrophy. There is  periventricular and deep white matter microangiopathic change. There is  some encephalomalacia suspected within the left middle cranial fossa. No  calvarial fracture is seen. The paranasal sinuses and mastoid air cells  appear clear. There are asymmetric degenerative changes of the right  TMJ. Left globe appears small and distorted.       Impression:      No acute intracranial findings.     Radiation dose reduction techniques were utilized, including automated  exposure control and exposure modulation based on body size.        This report was  finalized on 6/8/2025 8:58 PM by Dr. Neela Arvizu M.D on Workstation: [id number]       XR Chest 1 View [[id number]] Collected: 06/08/25 1740     Updated: 06/08/25 1746    Narrative:      XR CHEST 1 VW-        INDICATION: Shortness of air     COMPARISON: Chest radiograph June 4, 2025     TECHNIQUE: 1 view chest     FINDINGS:      Calcified pulmonary nodules in the left lower lung, consistent with  prior granulomatous infection. Linear opacities at the left lung base.  No effusions. Enlarged cardiac silhouette. Calcified mediastinal and  left hilar lymph nodes, consistent with prior granulomatous infection.  Old right-sided rib fractures. Left humeral head anchor and suspected  old Hill-Sachs deformity. Right glenohumeral arthropathy, incompletely  imaged. Thoracic dextrocurvature.       Impression:         1. Stable chest.  2. Cardiomegaly.  3. Subsegmental atelectasis or scarring suspected at the left lung base  4. Old right rib cage trauma     This report was finalized on 6/8/2025 5:43 PM by Dr. Curly Cee M.D  on Workstation: [id number]               Results for orders placed during the hospital encounter of 03/12/25    Adult Transthoracic Echo Complete w/ Color, Spectral and Contrast if Necessary Per Protocol    Interpretation Summary    Left ventricular systolic function is normal. Left ventricular ejection fraction appears to be 61 - 65%.    Left ventricular diastolic function was indeterminate.    Moderately reduced right ventricular systolic function noted.    The right ventricular cavity is moderately dilated.    The left atrial cavity is moderately dilated.    The right atrial cavity is moderately  dilated.    There is mild calcification of the aortic valve.    There is bileaflet mitral valve thickening present.    Severe mitral valve regurgitation is present.    Severe tricuspid valve regurgitation is present.    Estimated right ventricular systolic pressure from tricuspid regurgitation is  markedly elevated (>55 mmHg).    3/25/25  Conclusions:   1. Left main: Normal  2. LAD: Diffuse 20 to 30% proximal stenosis.  Discrete 30 to 40% mid vessel stenosis.  Mid septal  branch with discrete 99% stenosis  3. LCX: Diffuse 20 to 30% mid vessel stenosis  4. RCA: Luminal irregularities proximal segment.  5.  Mildly elevated right atrial and wedge pressure.  Large V wave on wedge pressure.  6.  Moderate pulmonary hypertension      EK-8-25      BASELINE:     25    I personally viewed and interpreted the patient's EKG/Telemetry data.    Assessment:   Assessment & Plan   1.  Dyspnea  2.  Rhinovirus infection  3.  Acute on chronic heart failure with preserved ejection fraction  4.  Severe mitral valve regurgitation  5.  Weakness and falls  6.  Acute on chronic kidney injury    -Fine continuing IV Lasix today.  I do not think she has a significant amount of volume on her regardless of her proBNP  - Patient is not a candidate for mitral valve clip.  - Recommend continue supportive care.  Currently only on 2 L nasal cannula with good oxygen saturations.  - Continue antihypertensive therapy.    Thank you for allowing me to participate in the care of Inge Borjas. Feel free to contact me directly with any further questions or concerns.    Mukul Bloom MD  [facility]  06/10/25  09:26 EDT

## 2025-06-10 PROBLEM — E44.0 MODERATE PROTEIN-CALORIE MALNUTRITION: Status: ACTIVE | Noted: 2025-06-10

## 2025-06-10 LAB
ANION GAP SERPL CALCULATED.3IONS-SCNC: 9.2 MMOL/L (ref 5–15)
BASOPHILS # BLD AUTO: 0.01 10*3/MM3 (ref 0–0.2)
BASOPHILS NFR BLD AUTO: 0.2 % (ref 0–1.5)
BUN SERPL-MCNC: 47 MG/DL (ref 8–23)
BUN/CREAT SERPL: 27.3 (ref 7–25)
CALCIUM SPEC-SCNC: 8.5 MG/DL (ref 8.2–9.6)
CHLORIDE SERPL-SCNC: 100 MMOL/L (ref 98–107)
CHOLEST SERPL-MCNC: 132 MG/DL (ref 0–200)
CK SERPL-CCNC: 53 U/L (ref 20–180)
CO2 SERPL-SCNC: 24.8 MMOL/L (ref 22–29)
CREAT SERPL-MCNC: 1.72 MG/DL (ref 0.57–1)
DEPRECATED RDW RBC AUTO: 49.9 FL (ref 37–54)
EGFRCR SERPLBLD CKD-EPI 2021: 27.6 ML/MIN/1.73
EOSINOPHIL # BLD AUTO: 0.05 10*3/MM3 (ref 0–0.4)
EOSINOPHIL NFR BLD AUTO: 1.2 % (ref 0.3–6.2)
ERYTHROCYTE [DISTWIDTH] IN BLOOD BY AUTOMATED COUNT: 14.2 % (ref 12.3–15.4)
GLUCOSE SERPL-MCNC: 71 MG/DL (ref 65–99)
HCT VFR BLD AUTO: 31 % (ref 34–46.6)
HDLC SERPL-MCNC: 56 MG/DL (ref 40–60)
HGB BLD-MCNC: 10.3 G/DL (ref 12–15.9)
IMM GRANULOCYTES # BLD AUTO: 0.06 10*3/MM3 (ref 0–0.05)
IMM GRANULOCYTES NFR BLD AUTO: 1.4 % (ref 0–0.5)
LDLC SERPL CALC-MCNC: 64 MG/DL (ref 0–100)
LDLC/HDLC SERPL: 1.15 {RATIO}
LYMPHOCYTES # BLD AUTO: 0.99 10*3/MM3 (ref 0.7–3.1)
LYMPHOCYTES NFR BLD AUTO: 23.9 % (ref 19.6–45.3)
MCH RBC QN AUTO: 32 PG (ref 26.6–33)
MCHC RBC AUTO-ENTMCNC: 33.2 G/DL (ref 31.5–35.7)
MCV RBC AUTO: 96.3 FL (ref 79–97)
MONOCYTES # BLD AUTO: 0.4 10*3/MM3 (ref 0.1–0.9)
MONOCYTES NFR BLD AUTO: 9.7 % (ref 5–12)
NEUTROPHILS NFR BLD AUTO: 2.63 10*3/MM3 (ref 1.7–7)
NEUTROPHILS NFR BLD AUTO: 63.6 % (ref 42.7–76)
NRBC BLD AUTO-RTO: 0 /100 WBC (ref 0–0.2)
PLATELET # BLD AUTO: 108 10*3/MM3 (ref 140–450)
PMV BLD AUTO: 10.2 FL (ref 6–12)
POTASSIUM SERPL-SCNC: 4.4 MMOL/L (ref 3.5–5.2)
RBC # BLD AUTO: 3.22 10*6/MM3 (ref 3.77–5.28)
SODIUM SERPL-SCNC: 134 MMOL/L (ref 136–145)
TRIGL SERPL-MCNC: 57 MG/DL (ref 0–150)
VLDLC SERPL-MCNC: 12 MG/DL (ref 5–40)
WBC NRBC COR # BLD AUTO: 4.14 10*3/MM3 (ref 3.4–10.8)

## 2025-06-10 PROCEDURE — 97530 THERAPEUTIC ACTIVITIES: CPT

## 2025-06-10 PROCEDURE — 99232 SBSQ HOSP IP/OBS MODERATE 35: CPT | Performed by: INTERNAL MEDICINE

## 2025-06-10 PROCEDURE — 97535 SELF CARE MNGMENT TRAINING: CPT

## 2025-06-10 PROCEDURE — 25010000002 HEPARIN (PORCINE) PER 1000 UNITS: Performed by: INTERNAL MEDICINE

## 2025-06-10 PROCEDURE — 85025 COMPLETE CBC W/AUTO DIFF WBC: CPT | Performed by: INTERNAL MEDICINE

## 2025-06-10 PROCEDURE — 82550 ASSAY OF CK (CPK): CPT | Performed by: INTERNAL MEDICINE

## 2025-06-10 PROCEDURE — 80048 BASIC METABOLIC PNL TOTAL CA: CPT | Performed by: INTERNAL MEDICINE

## 2025-06-10 PROCEDURE — 94799 UNLISTED PULMONARY SVC/PX: CPT

## 2025-06-10 PROCEDURE — 94761 N-INVAS EAR/PLS OXIMETRY MLT: CPT

## 2025-06-10 PROCEDURE — 94664 DEMO&/EVAL PT USE INHALER: CPT

## 2025-06-10 PROCEDURE — 25010000002 FUROSEMIDE PER 20 MG: Performed by: INTERNAL MEDICINE

## 2025-06-10 PROCEDURE — 80061 LIPID PANEL: CPT | Performed by: INTERNAL MEDICINE

## 2025-06-10 RX ORDER — LEVOTHYROXINE SODIUM 75 UG/1
75 TABLET ORAL
Status: DISCONTINUED | OUTPATIENT
Start: 2025-06-11 | End: 2025-06-12 | Stop reason: HOSPADM

## 2025-06-10 RX ORDER — CYCLOBENZAPRINE HCL 10 MG
5 TABLET ORAL 3 TIMES DAILY PRN
Status: DISCONTINUED | OUTPATIENT
Start: 2025-06-10 | End: 2025-06-12 | Stop reason: HOSPADM

## 2025-06-10 RX ORDER — TORSEMIDE 20 MG/1
40 TABLET ORAL DAILY
Status: DISCONTINUED | OUTPATIENT
Start: 2025-06-10 | End: 2025-06-11

## 2025-06-10 RX ADMIN — HEPARIN SODIUM 5000 UNITS: 5000 INJECTION INTRAVENOUS; SUBCUTANEOUS at 21:16

## 2025-06-10 RX ADMIN — Medication 500 MG: at 09:03

## 2025-06-10 RX ADMIN — ATENOLOL 100 MG: 25 TABLET ORAL at 09:03

## 2025-06-10 RX ADMIN — DORZOLAMIDE HCL 1 DROP: 20 SOLUTION/ DROPS OPHTHALMIC at 09:04

## 2025-06-10 RX ADMIN — Medication 1 TABLET: at 09:03

## 2025-06-10 RX ADMIN — LOSARTAN POTASSIUM 100 MG: 100 TABLET, FILM COATED ORAL at 09:03

## 2025-06-10 RX ADMIN — GUAIFENESIN 1200 MG: 600 TABLET, EXTENDED RELEASE ORAL at 21:16

## 2025-06-10 RX ADMIN — IPRATROPIUM BROMIDE AND ALBUTEROL SULFATE 3 ML: .5; 3 SOLUTION RESPIRATORY (INHALATION) at 07:29

## 2025-06-10 RX ADMIN — ATENOLOL 100 MG: 25 TABLET ORAL at 21:26

## 2025-06-10 RX ADMIN — TORSEMIDE 40 MG: 20 TABLET ORAL at 15:31

## 2025-06-10 RX ADMIN — SPIRONOLACTONE 25 MG: 25 TABLET ORAL at 09:03

## 2025-06-10 RX ADMIN — IPRATROPIUM BROMIDE AND ALBUTEROL SULFATE 3 ML: .5; 3 SOLUTION RESPIRATORY (INHALATION) at 15:39

## 2025-06-10 RX ADMIN — CYCLOBENZAPRINE HYDROCHLORIDE 5 MG: 10 TABLET, FILM COATED ORAL at 21:15

## 2025-06-10 RX ADMIN — BRIMONIDINE TARTRATE 1 DROP: 2 SOLUTION/ DROPS OPHTHALMIC at 21:17

## 2025-06-10 RX ADMIN — BRIMONIDINE TARTRATE 1 DROP: 2 SOLUTION/ DROPS OPHTHALMIC at 09:03

## 2025-06-10 RX ADMIN — TERAZOSIN 5 MG: 5 CAPSULE ORAL at 21:18

## 2025-06-10 RX ADMIN — ACETAMINOPHEN 650 MG: 325 TABLET, FILM COATED ORAL at 21:15

## 2025-06-10 RX ADMIN — CARBAMAZEPINE 100 MG: 100 TABLET, EXTENDED RELEASE ORAL at 09:03

## 2025-06-10 RX ADMIN — ASPIRIN 81 MG: 81 TABLET, COATED ORAL at 09:03

## 2025-06-10 RX ADMIN — FUROSEMIDE 40 MG: 10 INJECTION, SOLUTION INTRAMUSCULAR; INTRAVENOUS at 05:58

## 2025-06-10 RX ADMIN — HEPARIN SODIUM 5000 UNITS: 5000 INJECTION INTRAVENOUS; SUBCUTANEOUS at 09:04

## 2025-06-10 RX ADMIN — CARBAMAZEPINE 100 MG: 100 TABLET, EXTENDED RELEASE ORAL at 21:17

## 2025-06-10 RX ADMIN — DORZOLAMIDE HCL 1 DROP: 20 SOLUTION/ DROPS OPHTHALMIC at 21:17

## 2025-06-10 RX ADMIN — GUAIFENESIN 1200 MG: 600 TABLET, EXTENDED RELEASE ORAL at 09:03

## 2025-06-10 RX ADMIN — LEVOTHYROXINE SODIUM 50 MCG: 50 TABLET ORAL at 05:59

## 2025-06-10 RX ADMIN — DORZOLAMIDE HCL 1 DROP: 20 SOLUTION/ DROPS OPHTHALMIC at 15:32

## 2025-06-10 RX ADMIN — FLUTICASONE PROPIONATE 2 SPRAY: 50 SPRAY, METERED NASAL at 09:03

## 2025-06-10 NOTE — PROGRESS NOTES
"Patient Care Team:  Angelica Anthony MD as PCP - General  Angelica Anthony MD as PCP - Family Medicine  Inge Jack MD as Referring Physician (General Surgery)  Len Escobar MD as Consulting Physician (Hematology and Oncology)    Chief Complaint: Severe mitral valve regurgitation, dyspnea, rhinovirus infection    Interval History:   Blood pressure low normal.    Objective   Vital Signs  Temp:  [97.3 °F (36.3 °C)-98.1 °F (36.7 °C)] 97.5 °F (36.4 °C)  Heart Rate:  [46-90] 90  Resp:  [16-20] 20  BP: ()/(62-93) 98/62  No intake or output data in the 24 hours ending 06/10/25 1552  Flowsheet Rows      Flowsheet Row First Filed Value   Admission Height 152.4 cm (60\") Documented at 06/08/2025 2300   Admission Weight 43.1 kg (95 lb 1.6 oz) Documented at 06/08/2025 2300            Temp:  [97.3 °F (36.3 °C)-98.1 °F (36.7 °C)] 97.5 °F (36.4 °C)  Heart Rate:  [46-90] 90  Resp:  [16-20] 20  BP: ()/(62-93) 98/62  No intake or output data in the 24 hours ending 06/10/25 1552  Flowsheet Rows      Flowsheet Row First Filed Value   Admission Height 152.4 cm (60\") Documented at 06/08/2025 2300   Admission Weight 43.1 kg (95 lb 1.6 oz) Documented at 06/08/2025 2300            General Appearance:    Alert, cooperative, in no acute distress.  Frail.   Head:    Normocephalic, without obvious abnormality, atraumatic       Neck/Lymph   No adenopathy, supple, no thyromegaly, no carotid bruit, no    JVD   Lungs:     Clear to auscultation bilaterally, no wheezes, rales, or     rhonchi    Cardiac:    Normal rate, regular rhythm, systolic murmur, no rub, no gallop   Chest Wall:    No abnormalities observed   GI:     Normal bowel sounds, soft, nontender, nondistended,            no rebound tenderness   Extremities:   No cyanosis, clubbing, or edema   Circulatory/Peripheral Vascular :   Pulses palpable and equal bilaterally   Integumentary:   No bleeding or rash. Normal temperature         aspirin, 81 mg, Oral, " Daily  atenolol, 100 mg, Oral, Q12H  brimonidine, 1 drop, Both Eyes, BID  calcium carbonate (oyster shell), 500 mg, Oral, Daily  carBAMazepine XR, 100 mg, Oral, BID  dorzolamide, 1 drop, Both Eyes, TID  fluticasone, 2 spray, Nasal, Daily  guaiFENesin, 1,200 mg, Oral, Q12H  heparin (porcine), 5,000 Units, Subcutaneous, Q12H  ipratropium-albuterol, 3 mL, Nebulization, 4x Daily - RT  [START ON 6/11/2025] levothyroxine, 75 mcg, Oral, Q AM  [Held by provider] losartan, 100 mg, Oral, Daily  multivitamin with minerals, 1 tablet, Oral, Daily  spironolactone, 25 mg, Oral, Daily  terazosin, 5 mg, Oral, Nightly  torsemide, 40 mg, Oral, Daily             Results Review:    Results from last 7 days   Lab Units 06/10/25  0405   SODIUM mmol/L 134*   POTASSIUM mmol/L 4.4   CHLORIDE mmol/L 100   CO2 mmol/L 24.8   BUN mg/dL 47.0*   CREATININE mg/dL 1.72*   GLUCOSE mg/dL 71   CALCIUM mg/dL 8.5     Results from last 7 days   Lab Units 06/10/25  0405 06/08/25  1817 06/08/25  1715   CK TOTAL U/L 53  --   --    HSTROP T ng/L  --  20* 21*     Results from last 7 days   Lab Units 06/10/25  0405   WBC 10*3/mm3 4.14   HEMOGLOBIN g/dL 10.3*   HEMATOCRIT % 31.0*   PLATELETS 10*3/mm3 108*         Results from last 7 days   Lab Units 06/10/25  0405   CHOLESTEROL mg/dL 132     Results from last 7 days   Lab Units 06/09/25  0958   MAGNESIUM mg/dL 2.4*     Results from last 7 days   Lab Units 06/10/25  0405   CHOLESTEROL mg/dL 132   TRIGLYCERIDES mg/dL 57   HDL CHOL mg/dL 56   LDL CHOL mg/dL 64     @LABRCNT(bnp)@  I reviewed the patient's new clinical results.  I personally viewed and interpreted the patient's EKG/Telemetry data            Assessment & Plan   1.  Dyspnea  2.  Rhinovirus infection  3.  Acute on chronic heart failure with preserved ejection fraction  4.  Severe mitral valve regurgitation  5.  Weakness and falls  6.  Acute on chronic kidney injury       - Patient is not a candidate for mitral valve clip.  - Losartan on hold with lower  blood pressure and renal insufficiency..  Renal consulted.  Continue p.o. torsemide.  IV Lasix was stopped.  -Agree with palliative care involvement.

## 2025-06-10 NOTE — PROGRESS NOTES
Name: Inge Borjas ADMIT: 2025   : 1932  PCP: Angelica Anthony MD    MRN: [id number] LOS: 2 days   AGE/SEX: 89 y.o. female  ROOM: [facility]     Subjective   Subjective   Patient continues with weakness and lethargy.  No myalgia.  Improved shortness of breath.  No chest pain.  No palpitation.  No cough.  No ankle edema.  No PND orthopnea.  No fever or chills.    Review of Systems  GI.  No abdominal pain.  No nausea or vomiting.  .  Denies dysuria or hematuria.  CNS.  No focal neurological symptoms.     Objective   Objective   Vital Signs  Temp:  [97.3 °F (36.3 °C)-98.1 °F (36.7 °C)] 97.5 °F (36.4 °C)  Heart Rate:  [46-90] 85  Resp:  [16-20] 20  BP: ()/(62-93) 98/62  SpO2:  [95 %-100 %] 95 %  on  Flow (L/min) (Oxygen Therapy):  [2] 2;   Device (Oxygen Therapy): nasal cannula  No intake or output data in the 24 hours ending 06/10/25 1403    Body mass index is 18.57 kg/m².      25  2300   Weight: 43.1 kg (95 lb 1.6 oz)     Physical Exam  General.  Elderly female.  Underweight.  Very friable.  Lethargic.  Alert oriented times 3 out of 4.  In no apparent respiratory distress or pain.  Patient appears depressed.  Eyes.  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  No pallor or jaundice.  Oral cavity.  Moist mucous membrane.  Neck.  Decreased distention of external jugular veins.  No JVD.  No lymphadenopathy or thyromegaly.  Cardio vascular regular rate and rhythm with a grade 2 systolic murmur.  Occasional ectopic beats.  Chest.  Poor bilateral air entry with right basilar rhonchi (improved from yesterday).  Abdomen.  Soft lax.  No tenderness.  No organomegaly.  No guarding or rebound.  EXTR.  No clubbing/cyanosis/edema.  CNS.  No gross focal neurological deficits      Results Review:      Results from last 7 days   Lab Units 06/10/25  0405 25  1756 25  1715 25  1507   SODIUM mmol/L 134* 133* 136 135*   POTASSIUM mmol/L 4.4 4.7 5.2 5.0   CHLORIDE mmol/L 100 97* 99 101   CO2  mmol/L 24.8 23.0 26.4 27.2   BUN mg/dL 47.0* 49.0* 47.0* 44.8*   CREATININE mg/dL 1.72* 1.90* 1.63* 1.70*   GLUCOSE mg/dL 71 100* 93 110*   CALCIUM mg/dL 8.5 9.0 9.6 9.6   AST (SGOT) U/L  --   --  24 26   ALT (SGPT) U/L  --   --  29 25     Estimated Creatinine Clearance: 14.2 mL/min (A) (by C-G formula based on SCr of 1.72 mg/dL (H)).          Results from last 7 days   Lab Units 06/10/25  0405 06/08/25  1817 06/08/25  1715   CK TOTAL U/L 53  --   --    HSTROP T ng/L  --  20* 21*     Results from last 7 days   Lab Units 06/08/25  1715   PROBNP pg/mL 6,285.0*     Results from last 7 days   Lab Units 06/08/25  1817   TSH uIU/mL 13.100*     Results from last 7 days   Lab Units 06/09/25  0958   MAGNESIUM mg/dL 2.4*     Results from last 7 days   Lab Units 06/10/25  0405   CHOLESTEROL mg/dL 132   TRIGLYCERIDES mg/dL 57   HDL CHOL mg/dL 56     Results from last 7 days   Lab Units 06/10/25  0405 06/08/25  1715 06/04/25  1507   WBC 10*3/mm3 4.14 5.96 6.94   HEMOGLOBIN g/dL 10.3* 11.9* 10.8*   HEMATOCRIT % 31.0* 36.3 34.4   PLATELETS 10*3/mm3 108* 158 97*   MCV fL 96.3 96.8 99.4*   MCH pg 32.0 31.7 31.2   MCHC g/dL 33.2 32.8 31.4*   RDW % 14.2 14.7 16.2*   RDW-SD fl 49.9 52.1 59.8*   MPV fL 10.2 9.9 10.1   NEUTROPHIL % % 63.6 77.1* 78.5*   LYMPHOCYTE % % 23.9 11.6* 12.2*   MONOCYTES % % 9.7 9.1 7.8   EOSINOPHIL % % 1.2 0.3 1.4   BASOPHIL % % 0.2 0.2 0.0   IMM GRAN % % 1.4* 1.7* 0.1   NEUTROS ABS 10*3/mm3 2.63 4.60 5.44   LYMPHS ABS 10*3/mm3 0.99 0.69* 0.85   MONOS ABS 10*3/mm3 0.40 0.54 0.54   EOS ABS 10*3/mm3 0.05 0.02 0.10   BASOS ABS 10*3/mm3 0.01 0.01 0.00   IMMATURE GRANS (ABS) 10*3/mm3 0.06* 0.10* 0.01   NRBC /100 WBC 0.0  --   --              Results from last 7 days   Lab Units 06/09/25  0958   PROCALCITONIN ng/mL 0.11                 Results from last 7 days   Lab Units 06/08/25  1840   ADENOVIRUS DETECTION BY PCR  Not Detected   CORONAVIRUS 229E  Not Detected   CORONAVIRUS HKU1  Not Detected   CORONAVIRUS NL63  Not  Detected   CORONAVIRUS OC43  Not Detected   HUMAN METAPNEUMOVIRUS  Not Detected   HUMAN RHINOVIRUS/ENTEROVIRUS  Detected*   INFLUENZA B PCR  Not Detected   PARAINFLUENZA 1  Not Detected   PARAINFLUENZA VIRUS 2  Not Detected   PARAINFLUENZA VIRUS 3  Not Detected   PARAINFLUENZA VIRUS 4  Not Detected   BORDETELLA PERTUSSIS PCR  Not Detected   CHLAMYDOPHILA PNEUMONIAE PCR  Not Detected   MYCOPLAMA PNEUMO PCR  Not Detected   INFLUENZA A PCR  Not Detected   RSV, PCR  Not Detected     Results from last 7 days   Lab Units 06/09/25  1017   NITRITE UA  Negative   WBC UA /HPF 21-50*   BACTERIA UA /HPF None Seen   SQUAM EPITHEL UA /HPF 0-2     Results from last 7 days   Lab Units 06/09/25  1017 06/09/25  0958   SODIUM UR mmol/L 108  --    CREATININE UR mg/dL 22.4  --    OSMOLALITY UR mOsm/kg 377  --    URIC ACID mg/dL  --  5.5       Imaging:  Imaging Results (Last 24 Hours)       ** No results found for the last 24 hours. **               I reviewed the patient's new clinical results / labs / tests / procedures      Assessment/Plan     Active Hospital Problems    Diagnosis  POA    **Shortness of breath [R06.02]  Yes    Moderate protein-calorie malnutrition [E44.0]  Yes    Rhinovirus infection [B34.8]  Yes    Weakness [R53.1]  Yes    Falls [R29.6]  Not Applicable    A-fib [I48.91]  Yes    Essential hypertension [I10]  Yes    Acute on chronic diastolic CHF (congestive heart failure) [I50.33]  Yes    Hypothyroidism (acquired) [E03.9]  Yes    Hyperlipidemia [E78.5]  Yes    Acute kidney failure [N17.9]  Yes    Chronic kidney failure, stage 3 (moderate) [N18.30]  Yes    Anemia [D64.9]  Yes    Mitral regurgitation [I34.0]  Yes      Resolved Hospital Problems   No resolved problems to display.           Dyspnea secondary to rhinovirus infection and acute on chronic diastolic congestive heart failure in a patient with a pulmonary hypertension.  Look below for management.  Continue oxygen and wean off as tolerated..  Rhinovirus  infection.  Chest x-ray without infiltrates.  No fever or leukocytosis.  Normal procalcitonin.  Supportive treatment with Mucinex and DuoNebs.  Acute on chronic diastolic congestive heart failure in a patient with a history of hypertension/MR/A-fib.  Patient denies any chest pain.  Volume status appears to be overloaded but has improved after IV diuresis..  Troponin is elevated but plateaued with negative delta.  proBNP is elevated.  Patient last echo on 3/2025 revealing a normal ejection fraction with biatrial enlargement and right ventricular enlargement and severe mitral regurgitation and pulmonary hypertension.  Blood pressure was initially uncontrolled and now is controlled with continuation and resumption of atenolol/Cardura/losartan/Aldactone.  Status post IV diuresis with good response and will stop IV diuresis and initiate p.o. Demadex.  Poor candidate for mitral valve repair given her age and medical status.  Noted and appreciate cardiology consult.  Weakness and falls.  CT scan of the brain and the neck is negative.  CNS examination is negative.  Musculoskeletal examination is unremarkable.  Potassium/magnesium/CPK are normal.  TSH is elevated (low below).  Noted.    Hypothyroidism.  TSH is elevated and will increase Synthroid.  History of dyslipidemia.  Not on treatment.  Check lipid profile.  History of colon cancer.  Asymptomatic with benign GI examination.  Given the patient age and general condition will not pursue   acute on top of stage IIIa chronic renal failure.  Baseline creatinine between 1.1 and 1.4.  Volume status was elevated and has improved after IV diuresis.  Will continue diuretics but switch to p.o.  Renal function is improving.  Urine electrolytes not suggestive of prerenal azotemia.  Uric acid is low.    Mild chronic anemia.  Baseline hemoglobin between 11 and 13.  Hemoglobin slowly declining.  Will monitor.  History of fibromyalgia/degenerative disc disease.  Tylenol.  VTE  prophylaxis.  Subcu heparin.      Discussed my findings and plan of treatment with the patient.  Disposition.  To SNF on Thursday (bed will be available then).        Noah Coyle MD  [facility]  06/10/25  14:03 EDT

## 2025-06-10 NOTE — PLAN OF CARE
Goal Outcome Evaluation:  Plan of Care Reviewed With: patient        Progress: improving  Outcome Evaluation: Patient with good participation in OT sesstion this AM. Therapeutic intervention focused on improving patient functional tolerance in preperation for independent ADLs and mobility at time of discharge through completion of ADLs in stance along with increased mobility. Patient continues to decline to complete toileting in bathroom, requests use of BSC despite encouragement to increase mobility to bathroom. Min A provided with toileting task for assist with brief management. CGA with all mobility with HHA, no LOBs noted. Patient seated upright in recliner at conclusion of therapy session. Patient will benefit from continued skilled OT intervention to promote return to independent baseline.

## 2025-06-10 NOTE — CONSULTS
.      [facility]   Inpatient Palliative Care Consultation  Patient Name: Inge Borjas   Patient : 1932   Date: 6/10/2025   Consulting Provider: Dr Coyle   Reason for Consultation: assistance with clarification of goals of care  Inpatient Palliative Care Team Consult  Consult performed by: Virginie Guy APRN  Consult ordered by: Noah Coyle MD        Chief Complaint: acute on chronic diastolic heart failure, acute hypoxic with rhinovirus, chronic kidney disease, grief of  recent death of spouse    Information obtained from:  friend/HCS, Deborah Lang RN    Summary of Palliative Illness and Palliative Assessment: 89 y.o. female  with a medical history chronic atrial fibrillation, colon cancer, frailty, essential hypertension and severe mitral and tricuspid regurgitation, and heart failure.  She presented to [facility] due ot shortness of breath, weakness and multiple falls. Of note, during most recent fall her spouse attempted to help her and fell himself, coded and  her in our ED. Workup in ER, acute on chronic heart failure, hypoxia, and rhinovirus. She was given IV Lasix placed on oxygen and breathing treatments. She did have CT of head and cervical spine with no acute findings. Consults were placed for cardiology. They evaluated and agreed with IV lasix. She is not a candidate for mitral valve clip.  The palliative care team was consulted for support with goals of care due to chronic conditions, fragility and recent death of spouse.       Symptom Assessment:     Pain Assessment:   Intensity: 0      Dyspnea Assessment:   Current Level of Dyspnea: 0      Constipation Assessment:   Current Level of Constipation: 0 No constipation      Medina Symptom Assessment Scale (ESAS): ESAS completed by Family caregiver and Health care professional caregiver  Pain: 0 No pain  Tiredness: 5  Drowsiness: 0 No drowsiness  Nausea: 0 No nausea  Appetite: 5  Shortness of breath:  0 No shortness of breath  Depression: unable to assess  Anxiety: 0 No anxiety  Best Wellbeing: 3  Other(Constipation): 0 No constipation    Discussion of Patient/Family Treatment Preferences and Goals of Care: There was a voluntary discussion of advance planning and goals of care discussion. The following were present for the visit: friend/HCSPrecious     Summary of Discussion:  The patient was resting and I did awaken.  She has a living will on file, that designates her  spouse, then friend, Precious. Precious was at bedside so I spoke with her. She was allowing patient to rest. The patient loss her spouse day of hospital admission and Precious is planning  for her and tentatively planned for this Thursday. I have asked CCP to help coordinate everything from a discharge standpoint.  I reviewed briefly patient acute and chronic conditions, also concerns for recent patient spouse death, along with discussion regarding patient transitioning to assisted living facility after rehab. We discussed uncertainty how this life changing events will effect patient. I provided information regarding palliative care/Pallitus as additional support in outpatient setting depending on how she does with rehab. She was very appreciative. I have also encouraged further conversation amongst the regarding CODE status and medical interventions and she voiced understanding. I did offer pastoral care for patient support and she reports the patient [employer] family has been visiting and support her. She was very appreciative of support provided. I will sign off and see PRN. I spoke with RN and CCP.         Review of Systems: Review of Systems - History obtained from chart review-weakness, decreased appetite,        Past Medical and Surgical History:    Past Medical History:   Diagnosis Date    Anemia     Arthritis     Atrial fibrillation     Intermittently    Colon cancer     Stage III    Disease of thyroid gland     hypothyroid    Drug  "therapy 2012    pill for 6 months    H/O Herpes simplex     virus of the lip    Hyperlipidemia     Hypertension     Intervertebral disk disease     Polio     Trigeminal neuralgia       Past Surgical History:   Procedure Laterality Date    ARTHROSCOPY SHOULDER W/ OPEN ROTATOR CUFF REPAIR      BREAST BIOPSY Left     at least 25 years ago.    CARDIAC CATHETERIZATION  3/25/2025    Procedure: Right and Left Heart Cath;  Surgeon: Mukul Bloom MD;  Location:  [facility];  Service: Cardiology;;    CARDIAC CATHETERIZATION N/A 3/25/2025    Procedure: Coronary angiography;  Surgeon: Mukul Bloom MD;  Location:  [facility];  Service: Cardiology;  Laterality: N/A;    COLON RESECTION  08/2015    JOINT REPLACEMENT  1947    Elbow transplant and shoulder fusion    TONSILLECTOMY            Palliative Care Psychosocial and Spiritual Screening    Living situation resided at home with spouse    No spiritual concerns identified      Physical Assessment:   /93 (BP Location: Left arm, Patient Position: Lying)   Pulse 90   Temp 97.3 °F (36.3 °C) (Oral)   Resp 16   Ht 152.4 cm (60\")   Wt 43.1 kg (95 lb 1.6 oz)   SpO2 99%   BMI 18.57 kg/m²    Palliative Performance Scale: Performance 50% based on the following measures: Ambulation: Mainly sit or lie down, Activity and Evidence of Disease: Unable to do any work, extensive evidence of disease, Self-Care: Considerable assistance required,  Intake: Normal or reduced, LOC: Full or confusion  Physical Exam  Constitutional:       General: She is not in acute distress.     Interventions: Nasal cannula in place.      Comments: NC @ 2 liters  Elderly    Pulmonary:      Effort: Pulmonary effort is normal.   Musculoskeletal:      Right lower leg: No edema.      Left lower leg: No edema.   Neurological:      Mental Status: She is oriented to person, place, and time.            Medications:    Current Facility-Administered Medications:    "  acetaminophen (TYLENOL) tablet 650 mg, 650 mg, Oral, Q6H PRN, Mary Basilio, APRN, 650 mg at 06/09/25 2139    aspirin EC tablet 81 mg, 81 mg, Oral, Daily, Harper Jacques MD, 81 mg at 06/10/25 0903    atenolol (TENORMIN) tablet 100 mg, 100 mg, Oral, Q12H, Harper Jacques MD, 100 mg at 06/10/25 0903    brimonidine (ALPHAGAN) 0.2 % ophthalmic solution 1 drop, 1 drop, Both Eyes, BID, Harper Jacques MD, 1 drop at 06/10/25 0903    calcium carbonate (oyster shell) tablet 500 mg, 500 mg, Oral, Daily, Harper Jacques MD, 500 mg at 06/10/25 0903    carBAMazepine XR (TEGretol  XR) 12 hr tablet 100 mg, 100 mg, Oral, BID, Harper Jacques MD, 100 mg at 06/10/25 0903    dorzolamide (TRUSOPT) 2 % ophthalmic solution 1 drop, 1 drop, Both Eyes, TID, Harper Jacques MD, 1 drop at 06/10/25 0904    fluticasone (FLONASE) 50 MCG/ACT nasal spray 2 spray, 2 spray, Nasal, Daily, Harper Jacques MD, 2 spray at 06/10/25 0903    guaiFENesin (MUCINEX) 12 hr tablet 1,200 mg, 1,200 mg, Oral, Q12H, Noah Coyle MD, 1,200 mg at 06/10/25 0903    heparin (porcine) 5000 UNIT/ML injection 5,000 Units, 5,000 Units, Subcutaneous, Q12H, Noah Coyle MD, 5,000 Units at 06/10/25 0904    ipratropium-albuterol (DUO-NEB) nebulizer solution 3 mL, 3 mL, Nebulization, 4x Daily - RT, Noah Coyle MD, 3 mL at 06/10/25 0729    levothyroxine (SYNTHROID, LEVOTHROID) tablet 50 mcg, 50 mcg, Oral, Q AM, Harper Jacques MD, 50 mcg at 06/10/25 0559    losartan (COZAAR) tablet 100 mg, 100 mg, Oral, Daily, Harper Jacques MD, 100 mg at 06/10/25 0903    multivitamin with minerals 1 tablet, 1 tablet, Oral, Daily, Harper Jacques MD, 1 tablet at 06/10/25 0903    sodium chloride 0.9 % flush 10 mL, 10 mL, Intravenous, PRN, Maria Del Rosario Braton MD    spironolactone (ALDACTONE) tablet 25 mg, 25 mg, Oral, Daily, Harper Jacques MD, 25 mg at 06/10/25 0903    terazosin (HYTRIN) capsule  5 mg, 5 mg, Oral, Nightly, Stingl, Harper Case MD, 5 mg at 06/09/25 2136     Allergies:   Allergies   Allergen Reactions    Cardizem [Diltiazem Hcl] Unknown - Low Severity    Amoxicillin Rash          Data (labs/images reviewed):   WBC   Date Value Ref Range Status   06/10/2025 4.14 3.40 - 10.80 10*3/mm3 Final     RBC   Date Value Ref Range Status   06/10/2025 3.22 (L) 3.77 - 5.28 10*6/mm3 Final     Hemoglobin   Date Value Ref Range Status   06/10/2025 10.3 (L) 12.0 - 15.9 g/dL Final     Hematocrit   Date Value Ref Range Status   06/10/2025 31.0 (L) 34.0 - 46.6 % Final     MCV   Date Value Ref Range Status   06/10/2025 96.3 79.0 - 97.0 fL Final     MCH   Date Value Ref Range Status   06/10/2025 32.0 26.6 - 33.0 pg Final     MCHC   Date Value Ref Range Status   06/10/2025 33.2 31.5 - 35.7 g/dL Final     RDW   Date Value Ref Range Status   06/10/2025 14.2 12.3 - 15.4 % Final     RDW-SD   Date Value Ref Range Status   06/10/2025 49.9 37.0 - 54.0 fl Final     MPV   Date Value Ref Range Status   06/10/2025 10.2 6.0 - 12.0 fL Final     Platelets   Date Value Ref Range Status   06/10/2025 108 (L) 140 - 450 10*3/mm3 Final     Neutrophil %   Date Value Ref Range Status   06/10/2025 63.6 42.7 - 76.0 % Final     Lymphocyte %   Date Value Ref Range Status   06/10/2025 23.9 19.6 - 45.3 % Final     Monocyte %   Date Value Ref Range Status   06/10/2025 9.7 5.0 - 12.0 % Final     Eosinophil %   Date Value Ref Range Status   06/10/2025 1.2 0.3 - 6.2 % Final     Basophil %   Date Value Ref Range Status   06/10/2025 0.2 0.0 - 1.5 % Final     Immature Grans %   Date Value Ref Range Status   06/10/2025 1.4 (H) 0.0 - 0.5 % Final     Neutrophils, Absolute   Date Value Ref Range Status   06/10/2025 2.63 1.70 - 7.00 10*3/mm3 Final     Lymphocytes, Absolute   Date Value Ref Range Status   06/10/2025 0.99 0.70 - 3.10 10*3/mm3 Final     Monocytes, Absolute   Date Value Ref Range Status   06/10/2025 0.40 0.10 - 0.90 10*3/mm3 Final      Eosinophils, Absolute   Date Value Ref Range Status   06/10/2025 0.05 0.00 - 0.40 10*3/mm3 Final     Basophils, Absolute   Date Value Ref Range Status   06/10/2025 0.01 0.00 - 0.20 10*3/mm3 Final     Immature Grans, Absolute   Date Value Ref Range Status   06/10/2025 0.06 (H) 0.00 - 0.05 10*3/mm3 Final     nRBC   Date Value Ref Range Status   06/10/2025 0.0 0.0 - 0.2 /100 WBC Final        Lab Results   Component Value Date    GLUCOSE 71 06/10/2025    BUN 47.0 (H) 06/10/2025    CREATININE 1.72 (H) 06/10/2025     (L) 06/10/2025    K 4.4 06/10/2025     06/10/2025    CALCIUM 8.5 06/10/2025    PROTEINTOT 7.1 06/08/2025    ALBUMIN 4.3 06/08/2025    ALT 29 06/08/2025    AST 24 06/08/2025    ALKPHOS 124 (H) 06/08/2025    BILITOT 0.4 06/08/2025    GLOB 2.8 06/08/2025    AGRATIO 1.5 06/08/2025    BCR 27.3 (H) 06/10/2025    ANIONGAP 9.2 06/10/2025    EGFR 27.6 (L) 06/10/2025        CT Chest Without Contrast Diagnostic  Narrative: CT OF THE CHEST WITHOUT CONTRAST     HISTORY: Frequent falls. Shortness of breath.     COMPARISON: None available.     TECHNIQUE: Axial CT imaging was obtained through the thorax. No IV  contrast was administered.     FINDINGS:  Old bilateral rib fractures are noted. The patient also has compression  deformity of T10, which was also present on exam from November 14, 2024.  There is thoracic scoliosis, with convexity to the left. There are  advanced degenerative changes of the right shoulder. There is biapical  scarring. Mediastinal lymph nodes do not appear pathologically enlarged.  The heart is enlarged. There is some interlobular septal thickening, as  well as trace right pleural effusion and small left pleural effusion.  Appearance suggests congestive heart failure. There is scarring noted at  the lung bases. The thoracic aorta is normal in caliber. There is  enlargement of the main pulmonary artery, which can be seen in the  setting of pulmonary arterial hypertension. Thyroid  gland, trachea, and  esophagus are within normal limits. Images through the upper abdomen  demonstrate a left renal cyst. Patient does appear to have some body  wall edema.     Impression:    1. No acute traumatic injury identified.  2. Cardiomegaly, interlobular septal thickening, and small effusions,  suggesting congestive heart failure.     Radiation dose reduction techniques were utilized, including automated  exposure control and exposure modulation based on body size.        This report was finalized on 6/8/2025 9:08 PM by Dr. Neela Arvizu M.D on Workstation: [id number]     CT Cervical Spine Without Contrast  Narrative: CT OF THE CERVICAL SPINE     HISTORY: Neck pain. Multiple falls.     COMPARISON: None available.     TECHNIQUE: Axial CT imaging was obtained through the cervical spine.  Coronal and sagittal reformatted images were obtained.     FINDINGS:  No acute fracture or subluxation of the cervical spine is seen. There is  anterolisthesis of C3 on C4 and C4 on C5. There is some retrolisthesis  of C5 on C6. Intervertebral disc space narrowing is most significant at  C5-C6 and C6-C7. There is no prevertebral soft tissue swelling.  Moderately Cini significant canal stenosis at any level. Neuroforaminal  narrowing is most significant on the left at C5-C6.     Impression: No acute fracture or subluxation identified.     Radiation dose reduction techniques were utilized, including automated  exposure control and exposure modulation based on body size.        This report was finalized on 6/8/2025 9:02 PM by Dr. Neela Arvizu M.D on Workstation: [id number]     CT Head Without Contrast  Narrative: CT OF THE HEAD WITHOUT CONTRAST     HISTORY: Multiple falls     COMPARISON: June 4, 2025     TECHNIQUE: Axial CT imaging was obtained through the brain. No IV  contrast was administered.     FINDINGS:  No acute intracranial hemorrhage is seen. There is atrophy. There is  periventricular and deep white  matter microangiopathic change. There is  some encephalomalacia suspected within the left middle cranial fossa. No  calvarial fracture is seen. The paranasal sinuses and mastoid air cells  appear clear. There are asymmetric degenerative changes of the right  TMJ. Left globe appears small and distorted.     Impression: No acute intracranial findings.     Radiation dose reduction techniques were utilized, including automated  exposure control and exposure modulation based on body size.        This report was finalized on 6/8/2025 8:58 PM by Dr. Neela Arvizu M.D on Workstation: [id number]     XR Chest 1 View  Narrative: XR CHEST 1 VW-        INDICATION: Shortness of air     COMPARISON: Chest radiograph June 4, 2025     TECHNIQUE: 1 view chest     FINDINGS:      Calcified pulmonary nodules in the left lower lung, consistent with  prior granulomatous infection. Linear opacities at the left lung base.  No effusions. Enlarged cardiac silhouette. Calcified mediastinal and  left hilar lymph nodes, consistent with prior granulomatous infection.  Old right-sided rib fractures. Left humeral head anchor and suspected  old Hill-Sachs deformity. Right glenohumeral arthropathy, incompletely  imaged. Thoracic dextrocurvature.     Impression:    1. Stable chest.  2. Cardiomegaly.  3. Subsegmental atelectasis or scarring suspected at the left lung base  4. Old right rib cage trauma     This report was finalized on 6/8/2025 5:43 PM by Dr. Curly Cee M.D  on Workstation: [id number]          Palliative Care Assessment and Recommendations: Inge Borjas is a 89 y.o. female with a primary palliative care diagnosis of acute on chronic diastolic heart failure, severe mitral regurgitation, chronic kidney disease stage IIIa. Palliative care was consulted for support with goals of care.     Prognosis and Palliative Performance Scale:  Performance 50% based on the following measures: Ambulation: Mainly sit or lie down, Activity  and Evidence of Disease: Unable to do any work, extensive evidence of disease, Self-Care: Considerable assistance required,  Intake: Normal or reduced, LOC: Full or confusion  Disease State: Controlled with current treatments  Symptom Management:   Pain: none identified, tylenol prn  Shortness of Breath: none reported, oxygen prn, scheduled duo neb  Constipation: none,   Other Palliative Symptoms: none  Goals of Care Treatment Preferences   Palliative Care Decision Making Capacity: intact  Healthcare Surrogate: Yes- name(s)- Precious, friend  What is Most important to patient/family at this time: unable to address with patient, HCS-desires for patient to go to rehab and then transition to assisted living facility  Code Status FULL     Other Recommendations: none  Follow up: sign off and prn  Discussed plan with: RN and HCS, CCP      I spent a total of 55 minutes today caring for patient including reviewing records, speaking with patient/family and collaborating with care team.           Thank you for consulting palliative care. If you need to reach the provider on call for the palliative care team please call [phone]      ZEESHAN Scott  6/10/2025 13:14 EDT

## 2025-06-10 NOTE — THERAPY TREATMENT NOTE
Patient Name: Inge Borjas  : 1932    MRN: [id number]                              Today's Date: 6/10/2025       Admit Date: 2025    Visit Dx:     ICD-10-CM ICD-9-CM   1. Shortness of breath  R06.02 786.05   2. Frequent falls  R29.6 V15.88   3. History of atrial fibrillation  Z86.79 V12.59   4. History of chronic CHF  Z86.79 V12.59   5. Acute hypoxic respiratory failure  J96.01 518.81   6. Rhinovirus infection  B34.8 079.3     Patient Active Problem List   Diagnosis    Trigeminal neuralgia    Malignant neoplasm of sigmoid colon    COLVIN (dyspnea on exertion)    Unstable angina    Dyspnea on exertion    Nonrheumatic tricuspid valve regurgitation    Mitral regurgitation    Shortness of breath    Rhinovirus infection    Weakness    Falls    A-fib    Essential hypertension    Acute on chronic diastolic CHF (congestive heart failure)    Hypothyroidism (acquired)    Hyperlipidemia    Acute kidney failure    Chronic kidney failure, stage 3 (moderate)    Anemia     Past Medical History:   Diagnosis Date    Anemia     Arthritis     Atrial fibrillation     Intermittently    Colon cancer     Stage III    Disease of thyroid gland     hypothyroid    Drug therapy 2012    pill for 6 months    H/O Herpes simplex     virus of the lip    Hyperlipidemia     Hypertension     Intervertebral disk disease     Polio     Trigeminal neuralgia      Past Surgical History:   Procedure Laterality Date    ARTHROSCOPY SHOULDER W/ OPEN ROTATOR CUFF REPAIR      BREAST BIOPSY Left     at least 25 years ago.    CARDIAC CATHETERIZATION  3/25/2025    Procedure: Right and Left Heart Cath;  Surgeon: Mukul Bloom MD;  Location:  [facility];  Service: Cardiology;;    CARDIAC CATHETERIZATION N/A 3/25/2025    Procedure: Coronary angiography;  Surgeon: Mukul Bloom MD;  Location:  [facility];  Service: Cardiology;  Laterality: N/A;    COLON RESECTION  2015    JOINT REPLACEMENT       Elbow transplant and shoulder fusion    TONSILLECTOMY        General Information       Row Name 06/10/25 1334          Physical Therapy Time and Intention    Document Type therapy note (daily note)  -EJ     Mode of Treatment co-treatment;physical therapy;occupational therapy  -EJ       [facility] Name 06/10/25 1334          General Information    Existing Precautions/Restrictions fall  -EJ               User Key  (r) = Recorded By, (t) = Taken By, (c) = Cosigned By      Initials Name Provider Type    EJ Radha Suarez, PT Physical Therapist                   Mobility       Row Name 06/10/25 1334          Bed Mobility    Supine-Sit [place] (Bed Mobility) standby assist  -EJ     Assistive Device (Bed Mobility) head of bed elevated  -EJ       Row Name 06/10/25 1334          Sit-Stand Transfer    Sit-Stand [place] (Transfers) verbal cues;contact guard  -EJ     Comment, (Sit-Stand Transfer) HHA  -EJ       [facility] Name 06/10/25 1334          Gait/Stairs (Locomotion)    [place] Level (Gait) contact guard;verbal cues  -EJ     Assistive Device (Gait) --  HHA  -EJ     Distance in Feet (Gait) 30  -EJ     Deviations/Abnormal Patterns (Gait) maxine decreased;stride length decreased  -EJ               User Key  (r) = Recorded By, (t) = Taken By, (c) = Cosigned By      Initials Name Provider Type    EJ Radha Suarez, PT Physical Therapist                   Obj/Interventions    No documentation.                  Goals/Plan    No documentation.                  Clinical Impression       [facility] Name 06/10/25 1334          Pain    Pretreatment Pain Rating 0/10 - no pain  -EJ     Posttreatment Pain Rating 0/10 - no pain  -EJ       [facility] Name 06/10/25 1334          Plan of Care Review    Plan of Care Reviewed With patient  -EJ     Outcome Evaluation Pt seen for PT this AM. She is sleeping upon entry to room, but awakens easily and agreable to participate w therapy. Pt able to transition to EOB w SBA. She stood w CGA  and then  used BSC. Pt then agreeable to ambulate short distance, approx 30 ft, w HHA/CGA. She demos slow pace, but no unsteadiness. Pt agreeable to sit up in recliner at end of session w all needs in reach. Noted plans for possible eventual DC to [facility]. WIll continue to follow and progress activity as tolerated.  -EJ       Row Name 06/10/25 1334          Positioning and Restraints    Pre-Treatment Position in bed  -EJ     Post Treatment Position chair  -EJ     In Chair notified nsg;reclined;call light within reach;encouraged to call for assist;exit alarm on;with family/caregiver  -EJ               User Key  (r) = Recorded By, (t) = Taken By, (c) = Cosigned By      Initials Name Provider Type    Radha Cook, PT Physical Therapist                   Outcome Measures       Row Name 06/10/25 1337 06/10/25 0800       How much help from another person do you currently need...    Turning from your back to your side while in flat bed without using bedrails? 4  -EJ 4  -KS    Moving from lying on back to sitting on the side of a flat bed without bedrails? 4  -EJ 4  -KS    Moving to and from a bed to a chair (including a wheelchair)? 3  -EJ 3  -KS    Standing up from a chair using your arms (e.g., wheelchair, bedside chair)? 3  -EJ 3  -KS    Climbing 3-5 steps with a railing? 3  -EJ 3  -KS    To walk in hospital room? 3  -EJ 3  -KS    AM-PAC 6 Clicks Score (PT) 20  -EJ 20  -KS              User Key  (r) = Recorded By, (t) = Taken By, (c) = Cosigned By      Initials Name Provider Type    Radha Cook, PT Physical Therapist    KS Siegrist, Kaitlyn, RN Registered Nurse                                   PT Recommendation and Plan  Planned Therapy Interventions (PT): balance training, bed mobility training, gait training, home exercise program, stretching, strengthening, stair training, ROM (range of motion), patient/family education, transfer training  Outcome Evaluation: Pt seen for PT this AM. She is sleeping upon entry  to room, but awakens easily and agreable to participate w therapy. Pt able to transition to EOB w SBA. She stood w CGA  and then used BSC. Pt then agreeable to ambulate short distance, approx 30 ft, w HHA/CGA. She demos slow pace, but no unsteadiness. Pt agreeable to sit up in recliner at end of session w all needs in reach. Noted plans for possible eventual DC to residential. WIll continue to follow and progress activity as tolerated.     Time Calculation:         PT Charges       Row Name 06/10/25 1337             Time Calculation    Start Time 1140  -EJ      Stop Time 1156  -EJ      Time Calculation (min) 16 min  -EJ      PT Received On 06/10/25  -EJ      PT - Next Appointment 06/11/25  -EJ                User Key  (r) = Recorded By, (t) = Taken By, (c) = Cosigned By      Initials Name Provider Type    Radha Cook, PT Physical Therapist                  Therapy Charges for Today       Code Description Service Date Service Provider Modifiers Qty    71308700913  PT EVAL MOD COMPLEXITY 3 6/9/2025 Radha Suarez, PT GP 1    08899173909  PT THERAPEUTIC ACT EA 15 MIN 6/9/2025 Radha Suarez, PT GP 1    06348795900 HC PT THERAPEUTIC ACT EA 15 MIN 6/10/2025 Radha Suarez, PT GP 1            PT G-Codes  Outcome Measure Options: AM-PAC 6 Clicks Daily Activity (OT)  AM-PAC 6 Clicks Score (PT): 20  AM-PAC 6 Clicks Score (OT): 17  PT Discharge Summary  Anticipated Discharge Disposition (PT): home with assist, home with home health, skilled nursing facility    Radha Suarez PT  6/10/2025

## 2025-06-10 NOTE — PLAN OF CARE
Goal Outcome Evaluation:              Outcome Evaluation: pt doing well this shift. Up to chair Ax1 w/ PT. 2L NC maintained. no complaints. spoke w/ palliative RN this shift. Denies any needs at this time. call light within reach

## 2025-06-10 NOTE — PROGRESS NOTES
"Nutrition Services    Patient Name: Inge Borjas  YOB: 1932  MRN: [id number]  Admission date: 6/8/2025    Assessment Date:  06/10/25    NUTRITION EVALUATION    Patient meets ASPEN/AND criteria for nutrition diagnosis of moderate malnutrition of chronic illness based on:     Moderate deficit in nutrient intake AEB BMI  Severe loss of muscle mass  Measurably reduced functional status        Reason for Encounter BMI, MST score 2+   Diagnosis/Problem Admission Diagnosis:  Shortness of breath [R06.02]  History of atrial fibrillation [Z86.79]  Rhinovirus infection [B34.8]  History of chronic CHF [Z86.79]  Frequent falls [R29.6]  Acute hypoxic respiratory failure [J96.01]    Problem List:    Shortness of breath    Mitral regurgitation    Rhinovirus infection    Weakness    Falls    A-fib    Essential hypertension    Acute on chronic diastolic CHF (congestive heart failure)    Hypothyroidism (acquired)    Hyperlipidemia    Acute kidney failure    Chronic kidney failure, stage 3 (moderate)    Anemia     Narrative 89 yo female adm with SOB - asleep at time of visit but able to complete NFPE without waking patient       PO Diet Diet: Cardiac; Healthy Heart (2-3 Na+); Fluid Consistency: Thin (IDDSI 0)   Allergies NKFA   Supplements n/a   PO Intake % Not documented, family friend at bedside, nursing unable to verify       Chewing/Swallowing Difficulty no issues identified at this time       Medications reviewed CaCO3, MVT   Labs  reviewed       Physical Findings appeared asleep     Edema no edema    GI Function WDL, last bowel movement: 6/6   Skin Status skin intact    Lines/Drains none   I/O reviewed        Height  Weight  BMI  Weight Trend     Height: 152.4 cm (60\")  Weight: 43.1 kg (95 lb 1.6 oz) (06/08/25 2300)  Body mass index is 18.57 kg/m².  Stable       NFPE See Malnutrition Severity Assessment, Date Completed: 6/10       Nutrition Problem (PES) Problem: Malnutrition (moderate)  Etiology: Medical Diagnosis " - Chronic Resp and Heart Failure   Signs/Symptoms: NFPE Results and BMI       Intervention/Plan Adding Magic Cups at lunch/dinner (Provides 580 kcals, 18 g protein if consumed) to help increase caloric intake    Will follow and assist as needed.    RD to follow up per protocol.     Malnutrition Severity Assessment      Patient meets criteria for : Moderate (non-severe) Malnutrition  Malnutrition Type (Last 8 Hours)       Malnutrition Severity Assessment       Row Name 06/10/25 1131       Malnutrition Severity Assessment    Malnutrition Type Chronic Disease - Related Malnutrition      Row Name 06/10/25 1131       Insufficient Energy Intake     Insufficient Energy Intake Findings Moderate    Insufficient Energy Intake  <50% of est. energy requirement for >or equal to 1 month      Row Name 06/10/25 1131       Muscle Loss    Loss of Muscle Mass Findings Severe    [place] Severe - deep hollowing/scooping, lack of muscle to touch, facial bones well defined    Clavicle Bone Region Severe - protruding prominent bone    Acromion Bone Region Severe - squared shoulders, bones, and acromion process protrusion prominent    Dorsal Hand Region Severe - prominent depression      Row Name 06/10/25 1131       Fat Loss    Subcutaneous Fat Loss Findings Mild    Orbital Region  --  mild    Upper Arm Region --  very thin, mild loss      Row Name 06/10/25 1144       Declining Functional Status    Declining Functional Status Findings Measurably Reduced      Row Name 06/10/25 1144       Criteria Met (Must meet criteria for severity in at least 2 of these categories: M Wasting, Fat Loss, Fluid, Secondary Signs, Wt. Status, Intake)    Patient meets criteria for  Moderate (non-severe) Malnutrition                       Results from last 7 days   Lab Units 06/10/25  0405 06/09/25  1756 06/08/25  1715 06/04/25  1507   SODIUM mmol/L 134* 133* 136 135*   POTASSIUM mmol/L 4.4 4.7 5.2 5.0   CHLORIDE mmol/L 100 97* 99 101   CO2 mmol/L 24.8 23.0  "26.4 27.2   BUN mg/dL 47.0* 49.0* 47.0* 44.8*   CREATININE mg/dL 1.72* 1.90* 1.63* 1.70*   CALCIUM mg/dL 8.5 9.0 9.6 9.6   BILIRUBIN mg/dL  --   --  0.4 0.5   ALK PHOS U/L  --   --  124* 104   ALT (SGPT) U/L  --   --  29 25   AST (SGOT) U/L  --   --  24 26   GLUCOSE mg/dL 71 100* 93 110*     Results from last 7 days   Lab Units 06/10/25  0405 06/09/25  0958   MAGNESIUM mg/dL  --  2.4*   HEMOGLOBIN g/dL 10.3*  --    HEMATOCRIT % 31.0*  --    TRIGLYCERIDES mg/dL 57  --      No results found for: \"HGBA1C\"  Wt Readings from Last 10 Encounters:   06/08/25 43.1 kg (95 lb 1.6 oz)   06/04/25 45.4 kg (100 lb)   05/12/25 41.3 kg (91 lb)   03/25/25 41.5 kg (91 lb 8 oz)   03/12/25 44 kg (97 lb)   03/03/25 44 kg (97 lb)   11/29/24 43.5 kg (96 lb)   08/14/23 45.4 kg (100 lb)   07/17/23 45.5 kg (100 lb 4.8 oz)   06/26/23 45.5 kg (100 lb 4.8 oz)       Electronically signed by:  Manpreet Olson RD  06/10/25 11:31 EDT  "

## 2025-06-10 NOTE — PLAN OF CARE
Goal Outcome Evaluation:  Plan of Care Reviewed With: patient           Outcome Evaluation: Pt seen for PT this AM. She is sleeping upon entry to room, but awakens easily and agreable to participate w therapy. Pt able to transition to EOB w SBA. She stood w CGA  and then used BSC. Pt then agreeable to ambulate short distance, approx 30 ft, w HHA/CGA. She demos slow pace, but no unsteadiness. Pt agreeable to sit up in recliner at end of session w all needs in reach. Noted plans for possible eventual DC to skilled nursing. WIll continue to follow and progress activity as tolerated.    Anticipated Discharge Disposition (PT): home with assist, home with home health, skilled nursing facility

## 2025-06-10 NOTE — THERAPY TREATMENT NOTE
Patient Name: Inge Borjas  : 1932    MRN: [id number]                              Today's Date: 6/10/2025       Admit Date: 2025    Visit Dx:     ICD-10-CM ICD-9-CM   1. Shortness of breath  R06.02 786.05   2. Frequent falls  R29.6 V15.88   3. History of atrial fibrillation  Z86.79 V12.59   4. History of chronic CHF  Z86.79 V12.59   5. Acute hypoxic respiratory failure  J96.01 518.81   6. Rhinovirus infection  B34.8 079.3     Patient Active Problem List   Diagnosis    Trigeminal neuralgia    Malignant neoplasm of sigmoid colon    COLVIN (dyspnea on exertion)    Unstable angina    Dyspnea on exertion    Nonrheumatic tricuspid valve regurgitation    Mitral regurgitation    Shortness of breath    Rhinovirus infection    Weakness    Falls    A-fib    Essential hypertension    Acute on chronic diastolic CHF (congestive heart failure)    Hypothyroidism (acquired)    Hyperlipidemia    Acute kidney failure    Chronic kidney failure, stage 3 (moderate)    Anemia    Moderate protein-calorie malnutrition     Past Medical History:   Diagnosis Date    Anemia     Arthritis     Atrial fibrillation     Intermittently    Colon cancer 2012    Stage III    Disease of thyroid gland     hypothyroid    Drug therapy 2012    pill for 6 months    H/O Herpes simplex     virus of the lip    Hyperlipidemia     Hypertension     Intervertebral disk disease     Polio     Trigeminal neuralgia      Past Surgical History:   Procedure Laterality Date    ARTHROSCOPY SHOULDER W/ OPEN ROTATOR CUFF REPAIR      BREAST BIOPSY Left     at least 25 years ago.    CARDIAC CATHETERIZATION  3/25/2025    Procedure: Right and Left Heart Cath;  Surgeon: Mukul Bloom MD;  Location:  [facility];  Service: Cardiology;;    CARDIAC CATHETERIZATION N/A 3/25/2025    Procedure: Coronary angiography;  Surgeon: Mukul Bloom MD;  Location:  [facility];  Service: Cardiology;  Laterality: N/A;    COLON RESECTION   08/2015    JOINT REPLACEMENT  1947    Elbow transplant and shoulder fusion    TONSILLECTOMY        General Information       Row Name 06/10/25 1306          OT Time and Intention    Document Type therapy note (daily note)  -ES     Mode of Treatment co-treatment;physical therapy;occupational therapy  Patient presents with deficits impacting both mobility and functional independence in ADLs. The patient has multifaceted rehabilitation needs that require the expertise and skilled hands of both physical and occupational therapy.  -ES     Patient Effort good  -ES       Row Name 06/10/25 1306          General Information    Existing Precautions/Restrictions fall  -ES       Row Name 06/10/25 1306          Cognition    Orientation Status (Cognition) oriented x 3  patient pleasant and cooperative, agreeable to therapy participation  -ES       Row Name 06/10/25 1306          Safety Issues/Impairments Affecting Functional Mobility    Impairments Affecting Function (Mobility) strength;endurance/activity tolerance  -ES               User Key  (r) = Recorded By, (t) = Taken By, (c) = Cosigned By      Initials Name Provider Type    ES Bailey Bae, OTR/L, CSRS Occupational Therapist                     Mobility/ADL's       Row Name 06/10/25 1307          Bed Mobility    Bed Mobility supine-sit  -ES     Supine-Sit [place] (Bed Mobility) standby assist  -ES       Row Name 06/10/25 1307          Transfers    Transfers sit-stand transfer;toilet transfer  -ES       Row Name 06/10/25 1307          Sit-Stand Transfer    Sit-Stand [place] (Transfers) verbal cues;contact guard  -ES     Assistive Device (Sit-Stand Transfers) other (see comments)  HHA  -ES       Row Name 06/10/25 1307          Toilet Transfer    Type (Toilet Transfer) sit-stand;stand-sit  -ES     [place] Level (Toilet Transfer) contact guard;verbal cues  -ES     Assistive Device (Toilet Transfer) commode, 3-in-1  -ES     Comment, (Toilet Transfer) cues for  body alignment with BSC prior to descent to decrease fall risk  -ES       Row Name 06/10/25 1307          Functional Mobility    Functional Mobility- Ind. Level contact guard assist  -ES     Functional Mobility- Device other (see comments)  HHA  -ES     Functional Mobility- Comment patient performs functional mobility to [facility], declines use of commode for toileting task. Mobilizes in room to bed side recliner. CGA with HHA with all mobility.  -ES       Row Name 06/10/25 1307          Activities of Daily Living    BADL Assessment/Intervention toileting  -ES       Row Name 06/10/25 1307          Toileting Assessment/Training    [place] Level (Toileting) toileting skills;perform perineal hygiene;change pad/brief;minimum assist (75% patient effort)  -ES     Comment, (Toileting) requires min A with brief management to doff soiled brief and don clean brief, able to complete pericare  -ES               User Key  (r) = Recorded By, (t) = Taken By, (c) = Cosigned By      Initials Name Provider Type    Bailey Galaviz, OTR/L, AXELS Occupational Therapist                   Obj/Interventions       Row Name 06/10/25 1451          Motor Skills    Functional Endurance improving  -ES       Row Name 06/10/25 1451          Balance    Balance Assessment sitting dynamic balance;standing static balance;standing dynamic balance  -ES     Dynamic Sitting Balance standby assist  -ES     Position, Sitting Balance unsupported  -ES     Static Standing Balance contact guard  -ES     Dynamic Standing Balance contact guard  -ES     Position/Device Used, Standing Balance supported;other (see comments)  HHA  -ES               User Key  (r) = Recorded By, (t) = Taken By, (c) = Cosigned By      Initials Name Provider Type    Bailey Galaviz, LOUISER/L, CSRS Occupational Therapist                   Goals/Plan    No documentation.                  Clinical Impression       Row Name 06/10/25 1452          Pain Assessment    Pretreatment Pain Rating 0/10  - no pain  -ES     Posttreatment Pain Rating 0/10 - no pain  -ES       Row Name 06/10/25 1452          Plan of Care Review    Plan of Care Reviewed With patient  -ES     Progress improving  -ES     Outcome Evaluation Patient with good participation in OT sesstion this AM. Therapeutic intervention focused on improving patient functional tolerance in preperation for independent ADLs and mobility at time of discharge through completion of ADLs in stance along with increased mobility. Patient continues to decline to complete toileting in bathroom, requests use of BSC despite encouragement to increase mobility to bathroom. Min A provided with toileting task for assist with brief management. CGA with all mobility with HHA, no LOBs noted. Patient seated upright in recliner at conclusion of therapy session. Patient will benefit from continued skilled OT intervention to promote return to independent baseline.  -ES       Row Name 06/10/25 1450          Positioning and Restraints    Pre-Treatment Position in bed  -ES     Post Treatment Position chair  -ES     In Chair reclined;call light within reach;encouraged to call for assist;exit alarm on;notified nsg  -ES               User Key  (r) = Recorded By, (t) = Taken By, (c) = Cosigned By      Initials Name Provider Type    ES Bailey Bae, OTR/L, CSRS Occupational Therapist                   Outcome Measures       Row Name 06/10/25 2253          How much help from another is currently needed...    Putting on and taking off regular lower body clothing? 3  -ES     Bathing (including washing, rinsing, and drying) 3  -ES     Toileting (which includes using toilet bed pan or urinal) 3  -ES     Putting on and taking off regular upper body clothing 3  -ES     Taking care of personal grooming (such as brushing teeth) 3  -ES     Eating meals 4  -ES     AM-PAC 6 Clicks Score (OT) 19  -ES       Row Name 06/10/25 1337 06/10/25 0800       How much help from another person do you currently  need...    Turning from your back to your side while in flat bed without using bedrails? 4  -EJ 4  -KS    Moving from lying on back to sitting on the side of a flat bed without bedrails? 4  -EJ 4  -KS    Moving to and from a bed to a chair (including a wheelchair)? 3  -EJ 3  -KS    Standing up from a chair using your arms (e.g., wheelchair, bedside chair)? 3  -EJ 3  -KS    Climbing 3-5 steps with a railing? 3  -EJ 3  -KS    To walk in hospital room? 3  -EJ 3  -KS    AM-PAC 6 Clicks Score (PT) 20  -EJ 20  -KS      Row Name 06/10/25 1453          Functional Assessment    Outcome Measure Options AM-PAC 6 Clicks Daily Activity (OT)  -ES               User Key  (r) = Recorded By, (t) = Taken By, (c) = Cosigned By      Initials Name Provider Type    EJ Radha Suarez, PT Physical Therapist    KS Siegrist, Kaitlyn, RN Registered Nurse    Bailey Galaviz, OTR/L, CSRS Occupational Therapist                    Occupational Therapy Education       Title: PT OT SLP Therapies (In Progress)       Topic: Occupational Therapy (In Progress)       Point: ADL training (Done)       Learning Progress Summary            Patient Acceptance, E, VU by PP at 6/9/2025 6134    Comment: Pt Ed on OT role, call light function and dc planning.                                      User Key       Initials Effective Dates Name Provider Type Discipline    PP 06/09/23 -  Sonam Day OT Occupational Therapist OT                  OT Recommendation and Plan     Plan of Care Review  Plan of Care Reviewed With: patient  Progress: improving  Outcome Evaluation: Patient with good participation in OT sesstion this AM. Therapeutic intervention focused on improving patient functional tolerance in preperation for independent ADLs and mobility at time of discharge through completion of ADLs in stance along with increased mobility. Patient continues to decline to complete toileting in bathroom, requests use of BSC despite encouragement to increase  mobility to bathroom. Min A provided with toileting task for assist with brief management. CGA with all mobility with HHA, no LOBs noted. Patient seated upright in recliner at conclusion of therapy session. Patient will benefit from continued skilled OT intervention to promote return to independent baseline.     Time Calculation:         Time Calculation- OT       Row Name 06/10/25 1454             Time Calculation- OT    OT Start Time 1144  -ES      OT Stop Time 1156  -ES      OT Time Calculation (min) 12 min  -ES      Total Timed Code Minutes- OT 12 minute(s)  -ES      OT Received On 06/10/25  -ES      OT - Next Appointment 06/11/25  -ES         Timed Charges    46023 - OT Self Care/Mgmt Minutes 12  -ES         Total Minutes    Timed Charges Total Minutes 12  -ES       Total Minutes 12  -ES                User Key  (r) = Recorded By, (t) = Taken By, (c) = Cosigned By      Initials Name Provider Type    ES Bailey Bae, OTR/L, CSRS Occupational Therapist                  Therapy Charges for Today       Code Description Service Date Service Provider Modifiers Qty    14502086241 HC OT SELF CARE/MGMT/TRAIN EA 15 MIN 6/10/2025 Bailey Bae, OTR/L, CSRS GO 1                 Bailey Bae OTR/L, CSRS  6/10/2025

## 2025-06-10 NOTE — CONSULTS
Referring Provider: Merle  Reason for Consultation: Acute kidney injury on CKD 3B.    Subjective     Chief complaint   Chief Complaint   Patient presents with    Shortness of Breath    Fall       History of present illness: 89-year-old female with a history of right kidney disease stage IIIb with baseline creatinine 1.4-1.7 (GFR overestimated with minimal muscle mass), chronic heart failure preserved ejection fraction with severe mitral and tricuspid regurg, pulmonary hypertension.  Presented 6/8/2025 to the hospital after several falls.  He also had some increased generalized weakness and shortness of breath.  Unfortunately her  was trying to assist her when he fell, cardiac arrested and passed away.  She was subsequently diagnosed with rhinovirus infection and admitted to the hospital.  Since admission,she has been diuresed with IV Lasix, blood pressure has been very labile with hypertension initially but now today blood pressure 98 systolic.  Did receive losartan this morning.  Initial creatinine 1.7 where it has really remained the past 48 hours.  She did have a creatinine 1.9 yesterday but 1.72 today.  Had been on losartan at home and this was resumed yesterday.  Oral diuretics in the form of now torsemide and Aldactone have been started.  Not weighed.  Urine output not measured.  2 L nasal cannula.  Most recent blood pressure 98/62.  Review of systems.  Eating fair.  Short of breath with movement.  No chest pain.  Productive cough.  No fever.  Constipated  Past Medical History:   Diagnosis Date    Anemia     Arthritis     Atrial fibrillation     Intermittently    Colon cancer 2012    Stage III    Disease of thyroid gland     hypothyroid    Drug therapy 2012    pill for 6 months    H/O Herpes simplex     virus of the lip    Hyperlipidemia     Hypertension     Intervertebral disk disease     Polio     Trigeminal neuralgia      Past Surgical History:   Procedure Laterality Date    ARTHROSCOPY SHOULDER  W/ OPEN ROTATOR CUFF REPAIR      BREAST BIOPSY Left     at least 25 years ago.    CARDIAC CATHETERIZATION  3/25/2025    Procedure: Right and Left Heart Cath;  Surgeon: Mukul Bloom MD;  Location:  [facility];  Service: Cardiology;;    CARDIAC CATHETERIZATION N/A 3/25/2025    Procedure: Coronary angiography;  Surgeon: Mukul Bloom MD;  Location:  [facility];  Service: Cardiology;  Laterality: N/A;    COLON RESECTION  08/2015    JOINT REPLACEMENT  1947    Elbow transplant and shoulder fusion    TONSILLECTOMY       Family History   Problem Relation Age of Onset    Colon cancer Father         Late in life    Heart disease Father     No Known Problems Mother      Newly   Social History     Tobacco Use    Smoking status: Never     Passive exposure: Never    Smokeless tobacco: Never   Vaping Use    Vaping status: Never Used   Substance Use Topics    Alcohol use: No    Drug use: No     Medications Prior to Admission   Medication Sig Dispense Refill Last Dose/Taking    acetaminophen (TYLENOL) 650 MG 8 hr tablet Take 1 tablet by mouth Every 8 (Eight) Hours As Needed for Mild Pain.   6/7/2025    aspirin 81 MG EC tablet Take 1 tablet by mouth Daily.   6/8/2025    atenolol (TENORMIN) 100 MG tablet Take 1 tablet (100 mg total) by mouth 2 (Two) Times a Day 240 tablet 1 6/8/2025    brimonidine (ALPHAGAN) 0.2 % ophthalmic solution Administer 1 drop to both eyes 2 (Two) Times a Day.   6/8/2025    calcium carbonate-vitamin d 600-400 MG-UNIT per tablet Take 1 tablet by mouth Daily.   6/8/2025    carBAMazepine XR (TEGretol  XR) 100 MG 12 hr tablet Take 1 tablet by mouth 2 (Two) Times a Day. 180 tablet 4 6/8/2025    dorzolamide (TRUSOPT) 2 % ophthalmic solution 1 drop 3 (Three) Times a Day.   6/8/2025    doxazosin (CARDURA) 4 MG tablet Take 1 tablet by mouth every night at bedtime. 90 tablet 4 6/7/2025    fluticasone (FLONASE) 50 MCG/ACT nasal spray Administer 2 sprays into  "the nostril(s) as directed by provider Daily.   6/8/2025    furosemide (LASIX) 20 MG tablet Take 1 tablet by mouth daily. 90 tablet 4 6/8/2025    losartan (COZAAR) 25 MG tablet Take 4 tablets by mouth Daily.   6/8/2025    Multiple Vitamins-Minerals (CENTRUM ADULTS PO) Take  by mouth.   6/8/2025    spironolactone (ALDACTONE) 25 MG tablet Take 1 tablet by mouth Daily.   Past Month    Synthroid 50 MCG tablet Take 1 tablet by mouth Every Morning.   6/8/2025     Allergies:  Cardizem [diltiazem hcl] and Amoxicillin    Review of Systems  14 points review of system was performed and it was negative other than what noted above in the HPI    Objective     Vital Signs  Temp:  [97.3 °F (36.3 °C)-98.1 °F (36.7 °C)] 97.5 °F (36.4 °C)  Heart Rate:  [46-90] 85  Resp:  [16-20] 20  BP: ()/(62-93) 98/62    Flowsheet Rows      Flowsheet Row First Filed Value   Admission Height 152.4 cm (60\") Documented at 06/08/2025 2300   Admission Weight 43.1 kg (95 lb 1.6 oz) Documented at 06/08/2025 2300             No intake/output data recorded.  I/O last 3 completed shifts:  In: -   Out: 300 [Urine:300]  No intake or output data in the 24 hours ending 06/10/25 4979    Physical Exam:  General Appearance: Elderly, frail.  Sitting up in bed.  Skin: warm and dry.  Pale  HEENT: pupils round and reactive to light, left periorbital edema. Oral mucosa normal, nonicteric sclera  Neck: supple, no JVD, trachea midline  Lungs: Bilateral crackles.  No wheezing.  On 2 L nasal cannula  Heart: Irregularly irregular  Abdomen: soft, nontender, normoactive bowels.  No body wall edema  : no palpable bladder  Extremities: Trace lower extremity edema.  Neuro: normal speech and mental status    Results Review:  Results for orders placed or performed during the hospital encounter of 06/08/25   ECG 12 Lead ED Triage Standing Order; SOA    Collection Time: 06/08/25  5:12 PM   Result Value Ref Range    QT Interval 387 ms    QTC Interval 483 ms   Comprehensive " Metabolic Panel    Collection Time: 06/08/25  5:15 PM    Specimen: Arm, Left; Blood   Result Value Ref Range    Glucose 93 65 - 99 mg/dL    BUN 47.0 (H) 8.0 - 23.0 mg/dL    Creatinine 1.63 (H) 0.57 - 1.00 mg/dL    Sodium 136 136 - 145 mmol/L    Potassium 5.2 3.5 - 5.2 mmol/L    Chloride 99 98 - 107 mmol/L    CO2 26.4 22.0 - 29.0 mmol/L    Calcium 9.6 8.2 - 9.6 mg/dL    Total Protein 7.1 6.0 - 8.5 g/dL    Albumin 4.3 3.5 - 5.2 g/dL    ALT (SGPT) 29 1 - 33 U/L    AST (SGOT) 24 1 - 32 U/L    Alkaline Phosphatase 124 (H) 39 - 117 U/L    Total Bilirubin 0.4 0.0 - 1.2 mg/dL    Globulin 2.8 gm/dL    A/G Ratio 1.5 g/dL    BUN/Creatinine Ratio 28.8 (H) 7.0 - 25.0    Anion Gap 10.6 5.0 - 15.0 mmol/L    eGFR 29.5 (L) >60.0 mL/min/1.73   BNP    Collection Time: 06/08/25  5:15 PM    Specimen: Arm, Left; Blood   Result Value Ref Range    proBNP 6,285.0 (H) 0.0 - 1,800.0 pg/mL   High Sensitivity Troponin T    Collection Time: 06/08/25  5:15 PM    Specimen: Arm, Left; Blood   Result Value Ref Range    HS Troponin T 21 (H) <14 ng/L   CBC Auto Differential    Collection Time: 06/08/25  5:15 PM    Specimen: Arm, Left; Blood   Result Value Ref Range    WBC 5.96 3.40 - 10.80 10*3/mm3    RBC 3.75 (L) 3.77 - 5.28 10*6/mm3    Hemoglobin 11.9 (L) 12.0 - 15.9 g/dL    Hematocrit 36.3 34.0 - 46.6 %    MCV 96.8 79.0 - 97.0 fL    MCH 31.7 26.6 - 33.0 pg    MCHC 32.8 31.5 - 35.7 g/dL    RDW 14.7 12.3 - 15.4 %    RDW-SD 52.1 37.0 - 54.0 fl    MPV 9.9 6.0 - 12.0 fL    Platelets 158 140 - 450 10*3/mm3    Neutrophil % 77.1 (H) 42.7 - 76.0 %    Lymphocyte % 11.6 (L) 19.6 - 45.3 %    Monocyte % 9.1 5.0 - 12.0 %    Eosinophil % 0.3 0.3 - 6.2 %    Basophil % 0.2 0.0 - 1.5 %    Immature Grans % 1.7 (H) 0.0 - 0.5 %    Neutrophils, Absolute 4.60 1.70 - 7.00 10*3/mm3    Lymphocytes, Absolute 0.69 (L) 0.70 - 3.10 10*3/mm3    Monocytes, Absolute 0.54 0.10 - 0.90 10*3/mm3    Eosinophils, Absolute 0.02 0.00 - 0.40 10*3/mm3    Basophils, Absolute 0.01 0.00 - 0.20  10*3/mm3    Immature Grans, Absolute 0.10 (H) 0.00 - 0.05 10*3/mm3   Green Top (Gel)    Collection Time: 06/08/25  5:15 PM   Result Value Ref Range    Extra Tube Hold for add-ons.    Lavender Top    Collection Time: 06/08/25  5:15 PM   Result Value Ref Range    Extra Tube hold for add-on    Gold Top - SST    Collection Time: 06/08/25  5:15 PM   Result Value Ref Range    Extra Tube Hold for add-ons.    Light Blue Top    Collection Time: 06/08/25  5:15 PM   Result Value Ref Range    Extra Tube Hold for add-ons.    High Sensitivity Troponin T 1Hr    Collection Time: 06/08/25  6:17 PM    Specimen: Blood   Result Value Ref Range    HS Troponin T 20 (H) <14 ng/L    Troponin T Numeric Delta -1 ng/L    Troponin T % Delta -5 Abnormal if >/= 20%   Carbamazepine Level, Total    Collection Time: 06/08/25  6:17 PM    Specimen: Blood   Result Value Ref Range    Carbamazepine Level 5.8 4.0 - 12.0 mcg/mL   TSH    Collection Time: 06/08/25  6:17 PM    Specimen: Blood   Result Value Ref Range    TSH 13.100 (H) 0.270 - 4.200 uIU/mL   Respiratory Panel PCR w/COVID-19(SARS-CoV-2) [facility] In-House, NP Swab in [facility], 2 HR TAT - Swab, Nasopharynx    Collection Time: 06/08/25  6:40 PM    Specimen: Nasopharynx; Swab   Result Value Ref Range    ADENOVIRUS, PCR Not Detected Not Detected    Coronavirus 229E Not Detected Not Detected    Coronavirus HKU1 Not Detected Not Detected    Coronavirus NL63 Not Detected Not Detected    Coronavirus OC43 Not Detected Not Detected    COVID19 Not Detected Not Detected - Ref. Range    Human Metapneumovirus Not Detected Not Detected    Human Rhinovirus/Enterovirus Detected (A) Not Detected    Influenza A PCR Not Detected Not Detected    Influenza B PCR Not Detected Not Detected    Parainfluenza Virus 1 Not Detected Not Detected    Parainfluenza Virus 2 Not Detected Not Detected    Parainfluenza Virus 3 Not Detected Not Detected    Parainfluenza Virus 4 Not Detected Not Detected    RSV, PCR  Not Detected Not Detected    Bordetella pertussis pcr Not Detected Not Detected    Bordetella parapertussis PCR Not Detected Not Detected    Chlamydophila pneumoniae PCR Not Detected Not Detected    Mycoplasma pneumo by PCR Not Detected Not Detected   Magnesium    Collection Time: 06/09/25  9:58 AM    Specimen: Blood   Result Value Ref Range    Magnesium 2.4 (H) 1.7 - 2.3 mg/dL   Uric Acid    Collection Time: 06/09/25  9:58 AM    Specimen: Blood   Result Value Ref Range    Uric Acid 5.5 2.4 - 5.7 mg/dL   Procalcitonin    Collection Time: 06/09/25  9:58 AM    Specimen: Blood   Result Value Ref Range    Procalcitonin 0.11 0.00 - 0.25 ng/mL   Urinalysis With Microscopic If Indicated (No Culture) - Urine, Clean Catch    Collection Time: 06/09/25 10:17 AM    Specimen: Urine, Clean Catch   Result Value Ref Range    Color, UA Yellow Yellow, Straw    Appearance, UA Clear Clear    pH, UA 6.5 5.0 - 8.0    Specific Gravity, UA 1.011 1.005 - 1.030    Glucose, UA Negative Negative    Ketones, UA Negative Negative    Bilirubin, UA Negative Negative    Blood, UA Negative Negative    Protein, UA Negative Negative    Leuk Esterase, UA Moderate (2+) (A) Negative    Nitrite, UA Negative Negative    Urobilinogen, UA 0.2 E.U./dL 0.2 - 1.0 E.U./dL   Creatinine Urine Random (kidney function) GFR component - Urine, Clean Catch    Collection Time: 06/09/25 10:17 AM    Specimen: Urine, Clean Catch   Result Value Ref Range    Creatinine, Urine 22.4 mg/dL   Osmolality, Urine - Urine, Clean Catch    Collection Time: 06/09/25 10:17 AM    Specimen: Urine, Clean Catch   Result Value Ref Range    Osmolality, Urine 377 mOsm/kg   Sodium, Urine, Random - Urine, Clean Catch    Collection Time: 06/09/25 10:17 AM    Specimen: Urine, Clean Catch   Result Value Ref Range    Sodium, Urine 108 mmol/L   Urinalysis, Microscopic Only - Urine, Clean Catch    Collection Time: 06/09/25 10:17 AM    Specimen: Urine, Clean Catch   Result Value Ref Range    RBC, UA  3-5 (A) None Seen, 0-2 /HPF    WBC, UA 21-50 (A) None Seen, 0-2 /HPF    Bacteria, UA None Seen None Seen /HPF    Squamous Epithelial Cells, UA 0-2 None Seen, 0-2 /HPF    Hyaline Casts, UA 0-2 None Seen /LPF    Methodology Manual Light Microscopy    Basic Metabolic Panel    Collection Time: 06/09/25  5:56 PM    Specimen: Blood   Result Value Ref Range    Glucose 100 (H) 65 - 99 mg/dL    BUN 49.0 (H) 8.0 - 23.0 mg/dL    Creatinine 1.90 (H) 0.57 - 1.00 mg/dL    Sodium 133 (L) 136 - 145 mmol/L    Potassium 4.7 3.5 - 5.2 mmol/L    Chloride 97 (L) 98 - 107 mmol/L    CO2 23.0 22.0 - 29.0 mmol/L    Calcium 9.0 8.2 - 9.6 mg/dL    BUN/Creatinine Ratio 25.8 (H) 7.0 - 25.0    Anion Gap 13.0 5.0 - 15.0 mmol/L    eGFR 24.5 (L) >60.0 mL/min/1.73   Basic Metabolic Panel    Collection Time: 06/10/25  4:05 AM    Specimen: Blood   Result Value Ref Range    Glucose 71 65 - 99 mg/dL    BUN 47.0 (H) 8.0 - 23.0 mg/dL    Creatinine 1.72 (H) 0.57 - 1.00 mg/dL    Sodium 134 (L) 136 - 145 mmol/L    Potassium 4.4 3.5 - 5.2 mmol/L    Chloride 100 98 - 107 mmol/L    CO2 24.8 22.0 - 29.0 mmol/L    Calcium 8.5 8.2 - 9.6 mg/dL    BUN/Creatinine Ratio 27.3 (H) 7.0 - 25.0    Anion Gap 9.2 5.0 - 15.0 mmol/L    eGFR 27.6 (L) >60.0 mL/min/1.73   CK    Collection Time: 06/10/25  4:05 AM    Specimen: Blood   Result Value Ref Range    Creatine Kinase 53 20 - 180 U/L   Lipid Panel    Collection Time: 06/10/25  4:05 AM    Specimen: Blood   Result Value Ref Range    Total Cholesterol 132 0 - 200 mg/dL    Triglycerides 57 0 - 150 mg/dL    HDL Cholesterol 56 40 - 60 mg/dL    LDL Cholesterol  64 0 - 100 mg/dL    VLDL Cholesterol 12 5 - 40 mg/dL    LDL/HDL Ratio 1.15    CBC Auto Differential    Collection Time: 06/10/25  4:05 AM    Specimen: Blood   Result Value Ref Range    WBC 4.14 3.40 - 10.80 10*3/mm3    RBC 3.22 (L) 3.77 - 5.28 10*6/mm3    Hemoglobin 10.3 (L) 12.0 - 15.9 g/dL    Hematocrit 31.0 (L) 34.0 - 46.6 %    MCV 96.3 79.0 - 97.0 fL    MCH 32.0 26.6 -  33.0 pg    MCHC 33.2 31.5 - 35.7 g/dL    RDW 14.2 12.3 - 15.4 %    RDW-SD 49.9 37.0 - 54.0 fl    MPV 10.2 6.0 - 12.0 fL    Platelets 108 (L) 140 - 450 10*3/mm3    Neutrophil % 63.6 42.7 - 76.0 %    Lymphocyte % 23.9 19.6 - 45.3 %    Monocyte % 9.7 5.0 - 12.0 %    Eosinophil % 1.2 0.3 - 6.2 %    Basophil % 0.2 0.0 - 1.5 %    Immature Grans % 1.4 (H) 0.0 - 0.5 %    Neutrophils, Absolute 2.63 1.70 - 7.00 10*3/mm3    Lymphocytes, Absolute 0.99 0.70 - 3.10 10*3/mm3    Monocytes, Absolute 0.40 0.10 - 0.90 10*3/mm3    Eosinophils, Absolute 0.05 0.00 - 0.40 10*3/mm3    Basophils, Absolute 0.01 0.00 - 0.20 10*3/mm3    Immature Grans, Absolute 0.06 (H) 0.00 - 0.05 10*3/mm3    nRBC 0.0 0.0 - 0.2 /100 WBC     Imaging Results (Last 72 Hours)       Procedure Component Value Units Date/Time    CT Chest Without Contrast Diagnostic [[id number]] Collected: 06/08/25 2102     Updated: 06/08/25 2111    Narrative:      CT OF THE CHEST WITHOUT CONTRAST     HISTORY: Frequent falls. Shortness of breath.     COMPARISON: None available.     TECHNIQUE: Axial CT imaging was obtained through the thorax. No IV  contrast was administered.     FINDINGS:  Old bilateral rib fractures are noted. The patient also has compression  deformity of T10, which was also present on exam from November 14, 2024.  There is thoracic scoliosis, with convexity to the left. There are  advanced degenerative changes of the right shoulder. There is biapical  scarring. Mediastinal lymph nodes do not appear pathologically enlarged.  The heart is enlarged. There is some interlobular septal thickening, as  well as trace right pleural effusion and small left pleural effusion.  Appearance suggests congestive heart failure. There is scarring noted at  the lung bases. The thoracic aorta is normal in caliber. There is  enlargement of the main pulmonary artery, which can be seen in the  setting of pulmonary arterial hypertension. Thyroid gland, trachea, and  esophagus are within  normal limits. Images through the upper abdomen  demonstrate a left renal cyst. Patient does appear to have some body  wall edema.       Impression:         1. No acute traumatic injury identified.  2. Cardiomegaly, interlobular septal thickening, and small effusions,  suggesting congestive heart failure.     Radiation dose reduction techniques were utilized, including automated  exposure control and exposure modulation based on body size.        This report was finalized on 6/8/2025 9:08 PM by Dr. Neela Arvizu M.D on Workstation: [id number]       CT Cervical Spine Without Contrast [[id number]] Collected: 06/08/25 2058     Updated: 06/08/25 2105    Narrative:      CT OF THE CERVICAL SPINE     HISTORY: Neck pain. Multiple falls.     COMPARISON: None available.     TECHNIQUE: Axial CT imaging was obtained through the cervical spine.  Coronal and sagittal reformatted images were obtained.     FINDINGS:  No acute fracture or subluxation of the cervical spine is seen. There is  anterolisthesis of C3 on C4 and C4 on C5. There is some retrolisthesis  of C5 on C6. Intervertebral disc space narrowing is most significant at  C5-C6 and C6-C7. There is no prevertebral soft tissue swelling.  Moderately Cini significant canal stenosis at any level. Neuroforaminal  narrowing is most significant on the left at C5-C6.       Impression:      No acute fracture or subluxation identified.     Radiation dose reduction techniques were utilized, including automated  exposure control and exposure modulation based on body size.        This report was finalized on 6/8/2025 9:02 PM by Dr. Neela Arvizu M.D on Workstation: [id number]       CT Head Without Contrast [[id number]] Collected: 06/08/25 2054     Updated: 06/08/25 2101    Narrative:      CT OF THE HEAD WITHOUT CONTRAST     HISTORY: Multiple falls     COMPARISON: June 4, 2025     TECHNIQUE: Axial CT imaging was obtained through the brain. No IV  contrast was administered.      FINDINGS:  No acute intracranial hemorrhage is seen. There is atrophy. There is  periventricular and deep white matter microangiopathic change. There is  some encephalomalacia suspected within the left middle cranial fossa. No  calvarial fracture is seen. The paranasal sinuses and mastoid air cells  appear clear. There are asymmetric degenerative changes of the right  TMJ. Left globe appears small and distorted.       Impression:      No acute intracranial findings.     Radiation dose reduction techniques were utilized, including automated  exposure control and exposure modulation based on body size.        This report was finalized on 6/8/2025 8:58 PM by Dr. Neela Arvizu M.D on Workstation: [id number]       XR Chest 1 View [[id number]] Collected: 06/08/25 1740     Updated: 06/08/25 1746    Narrative:      XR CHEST 1 VW-        INDICATION: Shortness of air     COMPARISON: Chest radiograph June 4, 2025     TECHNIQUE: 1 view chest     FINDINGS:      Calcified pulmonary nodules in the left lower lung, consistent with  prior granulomatous infection. Linear opacities at the left lung base.  No effusions. Enlarged cardiac silhouette. Calcified mediastinal and  left hilar lymph nodes, consistent with prior granulomatous infection.  Old right-sided rib fractures. Left humeral head anchor and suspected  old Hill-Sachs deformity. Right glenohumeral arthropathy, incompletely  imaged. Thoracic dextrocurvature.       Impression:         1. Stable chest.  2. Cardiomegaly.  3. Subsegmental atelectasis or scarring suspected at the left lung base  4. Old right rib cage trauma     This report was finalized on 6/8/2025 5:43 PM by Dr. Curly Cee M.D  on Workstation: [id number]                 aspirin, 81 mg, Oral, Daily  atenolol, 100 mg, Oral, Q12H  brimonidine, 1 drop, Both Eyes, BID  calcium carbonate (oyster shell), 500 mg, Oral, Daily  carBAMazepine XR, 100 mg, Oral, BID  dorzolamide, 1 drop, Both Eyes,  TID  fluticasone, 2 spray, Nasal, Daily  guaiFENesin, 1,200 mg, Oral, Q12H  heparin (porcine), 5,000 Units, Subcutaneous, Q12H  ipratropium-albuterol, 3 mL, Nebulization, 4x Daily - RT  [START ON 6/11/2025] levothyroxine, 75 mcg, Oral, Q AM  losartan, 100 mg, Oral, Daily  multivitamin with minerals, 1 tablet, Oral, Daily  spironolactone, 25 mg, Oral, Daily  terazosin, 5 mg, Oral, Nightly  torsemide, 40 mg, Oral, Daily           Assessment & Plan   Acute kidney injury on CKD 3A baseline creatinine 1.4.  Underlying hypertensive nephrosclerosis.  Acute component likely due to cardiorenal with blood pressure lability.  Serum creatinine actually better today.  Started losartan yesterday, which was a home medication.  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  Coronary artery disease.  Medical management.  Acute hypoxic respiratory failure rhinovirus infection.  Atrial fibrillation.  Rate controlled.  Advanced age with frailty.  7.  Hypothyroidism on replacement.  TSH elevated.  Replacement dose increased.  8.  Hypertension chronic kidney disease,, labile.  Blood pressure currently 98.  Will hold losartan until I have seen her in the morning.  Received a dose today.  9.  Multiple falls at home.  Plan:  Strict intake and output  Daily weight  3.  Continue current diuretic.  4.  Hold losartan in the morning until I have seen her.  I discussed the patient's findings and my recommendations with the patient.    Padmini Grimaldo MD  06/10/25  15:19 EDT    Please note that portions of this note were completed with a voice recognition program.

## 2025-06-11 LAB
ALBUMIN SERPL-MCNC: 3.4 G/DL (ref 3.5–5.2)
ANION GAP SERPL CALCULATED.3IONS-SCNC: 10.2 MMOL/L (ref 5–15)
BASOPHILS # BLD AUTO: 0.01 10*3/MM3 (ref 0–0.2)
BASOPHILS NFR BLD AUTO: 0.2 % (ref 0–1.5)
BUN SERPL-MCNC: 54 MG/DL (ref 8–23)
BUN/CREAT SERPL: 25.2 (ref 7–25)
CALCIUM SPEC-SCNC: 8.8 MG/DL (ref 8.2–9.6)
CHLORIDE SERPL-SCNC: 97 MMOL/L (ref 98–107)
CO2 SERPL-SCNC: 29.8 MMOL/L (ref 22–29)
CREAT SERPL-MCNC: 2.14 MG/DL (ref 0.57–1)
DEPRECATED RDW RBC AUTO: 54.1 FL (ref 37–54)
EGFRCR SERPLBLD CKD-EPI 2021: 21.3 ML/MIN/1.73
EOSINOPHIL # BLD AUTO: 0.06 10*3/MM3 (ref 0–0.4)
EOSINOPHIL NFR BLD AUTO: 1.1 % (ref 0.3–6.2)
ERYTHROCYTE [DISTWIDTH] IN BLOOD BY AUTOMATED COUNT: 15.2 % (ref 12.3–15.4)
GLUCOSE SERPL-MCNC: 81 MG/DL (ref 65–99)
HCT VFR BLD AUTO: 33.4 % (ref 34–46.6)
HGB BLD-MCNC: 10.9 G/DL (ref 12–15.9)
IMM GRANULOCYTES # BLD AUTO: 0.08 10*3/MM3 (ref 0–0.05)
IMM GRANULOCYTES NFR BLD AUTO: 1.4 % (ref 0–0.5)
LYMPHOCYTES # BLD AUTO: 1.28 10*3/MM3 (ref 0.7–3.1)
LYMPHOCYTES NFR BLD AUTO: 23.1 % (ref 19.6–45.3)
MCH RBC QN AUTO: 31.7 PG (ref 26.6–33)
MCHC RBC AUTO-ENTMCNC: 32.6 G/DL (ref 31.5–35.7)
MCV RBC AUTO: 97.1 FL (ref 79–97)
MONOCYTES # BLD AUTO: 0.56 10*3/MM3 (ref 0.1–0.9)
MONOCYTES NFR BLD AUTO: 10.1 % (ref 5–12)
NEUTROPHILS NFR BLD AUTO: 3.55 10*3/MM3 (ref 1.7–7)
NEUTROPHILS NFR BLD AUTO: 64.1 % (ref 42.7–76)
NRBC BLD AUTO-RTO: 0 /100 WBC (ref 0–0.2)
PHOSPHATE SERPL-MCNC: 3.4 MG/DL (ref 2.5–4.5)
PLATELET # BLD AUTO: 118 10*3/MM3 (ref 140–450)
PMV BLD AUTO: 9.9 FL (ref 6–12)
POTASSIUM SERPL-SCNC: 4.2 MMOL/L (ref 3.5–5.2)
RBC # BLD AUTO: 3.44 10*6/MM3 (ref 3.77–5.28)
SODIUM SERPL-SCNC: 137 MMOL/L (ref 136–145)
WBC NRBC COR # BLD AUTO: 5.54 10*3/MM3 (ref 3.4–10.8)

## 2025-06-11 PROCEDURE — 94799 UNLISTED PULMONARY SVC/PX: CPT

## 2025-06-11 PROCEDURE — 25010000002 HEPARIN (PORCINE) PER 1000 UNITS: Performed by: INTERNAL MEDICINE

## 2025-06-11 PROCEDURE — 94760 N-INVAS EAR/PLS OXIMETRY 1: CPT

## 2025-06-11 PROCEDURE — 25010000002 CEFTRIAXONE PER 250 MG: Performed by: HOSPITALIST

## 2025-06-11 PROCEDURE — 80069 RENAL FUNCTION PANEL: CPT | Performed by: INTERNAL MEDICINE

## 2025-06-11 PROCEDURE — 94761 N-INVAS EAR/PLS OXIMETRY MLT: CPT

## 2025-06-11 PROCEDURE — 85025 COMPLETE CBC W/AUTO DIFF WBC: CPT | Performed by: INTERNAL MEDICINE

## 2025-06-11 PROCEDURE — 94664 DEMO&/EVAL PT USE INHALER: CPT

## 2025-06-11 RX ORDER — TORSEMIDE 20 MG/1
20 TABLET ORAL DAILY
Status: DISCONTINUED | OUTPATIENT
Start: 2025-06-12 | End: 2025-06-12

## 2025-06-11 RX ADMIN — Medication 500 MG: at 08:28

## 2025-06-11 RX ADMIN — ASPIRIN 81 MG: 81 TABLET, COATED ORAL at 08:28

## 2025-06-11 RX ADMIN — CARBAMAZEPINE 100 MG: 100 TABLET, EXTENDED RELEASE ORAL at 22:29

## 2025-06-11 RX ADMIN — BRIMONIDINE TARTRATE 1 DROP: 2 SOLUTION/ DROPS OPHTHALMIC at 22:29

## 2025-06-11 RX ADMIN — HEPARIN SODIUM 5000 UNITS: 5000 INJECTION INTRAVENOUS; SUBCUTANEOUS at 08:26

## 2025-06-11 RX ADMIN — BRIMONIDINE TARTRATE 1 DROP: 2 SOLUTION/ DROPS OPHTHALMIC at 08:31

## 2025-06-11 RX ADMIN — ATENOLOL 100 MG: 25 TABLET ORAL at 22:27

## 2025-06-11 RX ADMIN — TERAZOSIN 5 MG: 5 CAPSULE ORAL at 22:28

## 2025-06-11 RX ADMIN — DORZOLAMIDE HCL 1 DROP: 20 SOLUTION/ DROPS OPHTHALMIC at 22:29

## 2025-06-11 RX ADMIN — IPRATROPIUM BROMIDE AND ALBUTEROL SULFATE 3 ML: .5; 3 SOLUTION RESPIRATORY (INHALATION) at 10:46

## 2025-06-11 RX ADMIN — SPIRONOLACTONE 25 MG: 25 TABLET ORAL at 08:28

## 2025-06-11 RX ADMIN — LEVOTHYROXINE SODIUM 75 MCG: 0.07 TABLET ORAL at 05:43

## 2025-06-11 RX ADMIN — HEPARIN SODIUM 5000 UNITS: 5000 INJECTION INTRAVENOUS; SUBCUTANEOUS at 22:27

## 2025-06-11 RX ADMIN — Medication 1 TABLET: at 08:26

## 2025-06-11 RX ADMIN — IPRATROPIUM BROMIDE AND ALBUTEROL SULFATE 3 ML: .5; 3 SOLUTION RESPIRATORY (INHALATION) at 07:17

## 2025-06-11 RX ADMIN — FLUTICASONE PROPIONATE 2 SPRAY: 50 SPRAY, METERED NASAL at 08:32

## 2025-06-11 RX ADMIN — IPRATROPIUM BROMIDE AND ALBUTEROL SULFATE 3 ML: .5; 3 SOLUTION RESPIRATORY (INHALATION) at 19:55

## 2025-06-11 RX ADMIN — IPRATROPIUM BROMIDE AND ALBUTEROL SULFATE 3 ML: .5; 3 SOLUTION RESPIRATORY (INHALATION) at 14:45

## 2025-06-11 RX ADMIN — DORZOLAMIDE HCL 1 DROP: 20 SOLUTION/ DROPS OPHTHALMIC at 08:31

## 2025-06-11 RX ADMIN — ATENOLOL 100 MG: 25 TABLET ORAL at 10:56

## 2025-06-11 RX ADMIN — GUAIFENESIN 1200 MG: 600 TABLET, EXTENDED RELEASE ORAL at 08:27

## 2025-06-11 RX ADMIN — GUAIFENESIN 1200 MG: 600 TABLET, EXTENDED RELEASE ORAL at 22:29

## 2025-06-11 RX ADMIN — DORZOLAMIDE HCL 1 DROP: 20 SOLUTION/ DROPS OPHTHALMIC at 15:35

## 2025-06-11 RX ADMIN — SODIUM CHLORIDE 1000 MG: 9 INJECTION, SOLUTION INTRAVENOUS at 13:17

## 2025-06-11 RX ADMIN — CARBAMAZEPINE 100 MG: 100 TABLET, EXTENDED RELEASE ORAL at 08:26

## 2025-06-11 NOTE — PLAN OF CARE
Goal Outcome Evaluation:  Plan of Care Reviewed With: patient        Progress: improving  Outcome Evaluation: Pt called out often and set alarm off too. Pt doing well with getting up to [facility]. Pt had some pain in her legs cramping mostly. Pt spouse  on tommorow. CTM,  safety maintianed.

## 2025-06-11 NOTE — PROGRESS NOTES
[facility] Progress Note      Patient Name: Inge Borjas  : 1932  MRN: [id number]  Primary Care Physician:  Angelica Anthony MD  Date of admission: 2025    Subjective     Interval History:   Acute kidney injury on CKD 3A.  1650 urine output.  Weight down.  Intake not recorded.  Blood pressure yesterday afternoon at 1:00  98 systolic.  Did receive losartan yesterday morning.  Review of Systems:   As noted above    Objective     Vitals:   Temp:  [97.5 °F (36.4 °C)-98.1 °F (36.7 °C)] 97.7 °F (36.5 °C)  Heart Rate:  [79-92] 83  Resp:  [14-20] 14  BP: ()/(53-88) 142/83  Flow (L/min) (Oxygen Therapy):  [1-2] 1    Intake/Output Summary (Last 24 hours) at 2025 0754  Last data filed at 2025 0548  Gross per 24 hour   Intake --   Output 1650 ml   Net -1650 ml       Physical Exam:    General Appearance: Frail, elderly.  Able to walk from the bed to the chair.  Skin: warm and dry  HEENT: oral mucosa normal, left periorbital edema.  Nonicteric sclera  Neck: supple, no JVD  Lungs: Improved today.  Better air movement.  Still with scattered rales.  On 1 L nasal cannula.  Heart: Irregularly irregular.  Abdomen: soft, nontender, nondistended. +bs  : no palpable bladder  Extremities: no edema  Neuro: normal speech and mental status     Scheduled Meds:     aspirin, 81 mg, Oral, Daily  atenolol, 100 mg, Oral, Q12H  brimonidine, 1 drop, Both Eyes, BID  calcium carbonate (oyster shell), 500 mg, Oral, Daily  carBAMazepine XR, 100 mg, Oral, BID  dorzolamide, 1 drop, Both Eyes, TID  fluticasone, 2 spray, Nasal, Daily  guaiFENesin, 1,200 mg, Oral, Q12H  heparin (porcine), 5,000 Units, Subcutaneous, Q12H  ipratropium-albuterol, 3 mL, Nebulization, 4x Daily - RT  levothyroxine, 75 mcg, Oral, Q AM  [Held by provider] losartan, 100 mg, Oral, Daily  multivitamin with minerals, 1 tablet, Oral, Daily  spironolactone, 25 mg, Oral, Daily  terazosin, 5 mg, Oral, Nightly  torsemide, 40 mg,  Oral, Daily      IV Meds:        Results Reviewed:   I have personally reviewed the results from the time of this admission to 6/11/2025 07:54 EDT     Results from last 7 days   Lab Units 06/11/25  0428 06/10/25  0405 06/09/25  1756 06/08/25 1715 06/04/25  1507   SODIUM mmol/L 137 134* 133* 136 135*   POTASSIUM mmol/L 4.2 4.4 4.7 5.2 5.0   CHLORIDE mmol/L 97* 100 97* 99 101   CO2 mmol/L 29.8* 24.8 23.0 26.4 27.2   BUN mg/dL 54.0* 47.0* 49.0* 47.0* 44.8*   CREATININE mg/dL 2.14* 1.72* 1.90* 1.63* 1.70*   CALCIUM mg/dL 8.8 8.5 9.0 9.6 9.6   BILIRUBIN mg/dL  --   --   --  0.4 0.5   ALK PHOS U/L  --   --   --  124* 104   ALT (SGPT) U/L  --   --   --  29 25   AST (SGOT) U/L  --   --   --  24 26   GLUCOSE mg/dL 81 71 100* 93 110*       Estimated Creatinine Clearance: 10.6 mL/min (A) (by C-G formula based on SCr of 2.14 mg/dL (H)).    Results from last 7 days   Lab Units 06/11/25 0428 06/09/25  0958   MAGNESIUM mg/dL  --  2.4*   PHOSPHORUS mg/dL 3.4  --        Results from last 7 days   Lab Units 06/09/25  0958   URIC ACID mg/dL 5.5       Results from last 7 days   Lab Units 06/11/25  0428 06/10/25  0405 06/08/25 1715 06/04/25  1507   WBC 10*3/mm3 5.54 4.14 5.96 6.94   HEMOGLOBIN g/dL 10.9* 10.3* 11.9* 10.8*   PLATELETS 10*3/mm3 118* 108* 158 97*             Assessment / Plan     ASSESSMENT:  Acute kidney injury on CKD 3A baseline creatinine 1.4-1.7.  GFR overestimated with her low muscle mass.  Underlying hypertensive nephrosclerosis.  Acute component likely due to cardiorenal with blood pressure lability.  Serum creatinine up today.  May be related to losartan effect with low blood pressure yesterday in the 90s systolic.  I would leave off of losartan.  Lower torsemide dose 20 mg starting tomorrow but hold diuretic today.  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  Hold diuretic today.  Reduce torsemide dose to 20 mg starting  tomorrow.  Coronary artery disease.  Medical management.  Acute hypoxic respiratory failure rhinovirus infection.  Atrial fibrillation.  Rate controlled.  Advanced age with frailty.  7.  Hypothyroidism on replacement.  TSH elevated.  Replacement dose increased.  8.  Hypertension with chronic kidney disease, labile.  Blood pressure 98 at 1 point yesterday afternoon after losartan.  Will leave her off of losartan.  Her frailty and propensity to falling would not try to lower her blood pressure further.  9.  Multiple falls at home.    PLAN:  Discontinue losartan  2.  Hold diuretic today.  3.  Start lower torsemide dose 20 mg and start tomorrow.  Thank you for involving us in the care of Inge Borjas.  Please feel free to call with any questions.    Padmini Grimaldo MD  06/11/25  07:54 EDT    [facility]  [phone]    Please note that portions of this note were completed with a voice recognition program.

## 2025-06-11 NOTE — PROGRESS NOTES
"DAILY PROGRESS NOTE  [facility]    Patient Identification:  Name: Inge Borjas  Age: 89 y.o.  Sex: female  :  1932  MRN: [id number]         Primary Care Physician: Angelica Anthony MD    Subjective:  Interval History: She is still very weak and lethargic.    Objective:    Scheduled Meds:aspirin, 81 mg, Oral, Daily  atenolol, 100 mg, Oral, Q12H  brimonidine, 1 drop, Both Eyes, BID  calcium carbonate (oyster shell), 500 mg, Oral, Daily  carBAMazepine XR, 100 mg, Oral, BID  dorzolamide, 1 drop, Both Eyes, TID  fluticasone, 2 spray, Nasal, Daily  guaiFENesin, 1,200 mg, Oral, Q12H  heparin (porcine), 5,000 Units, Subcutaneous, Q12H  ipratropium-albuterol, 3 mL, Nebulization, 4x Daily - RT  levothyroxine, 75 mcg, Oral, Q AM  multivitamin with minerals, 1 tablet, Oral, Daily  spironolactone, 25 mg, Oral, Daily  terazosin, 5 mg, Oral, Nightly  [START ON 2025] torsemide, 20 mg, Oral, Daily      Continuous Infusions:     Vital signs in last 24 hours:  Temp:  [97.5 °F (36.4 °C)-98.1 °F (36.7 °C)] 97.7 °F (36.5 °C)  Heart Rate:  [73-92] 73  Resp:  [14-20] 18  BP: ()/(53-88) 142/83    Intake/Output:    Intake/Output Summary (Last 24 hours) at 2025 1206  Last data filed at 2025 0804  Gross per 24 hour   Intake --   Output 2050 ml   Net -2050 ml       Exam:  /83 (BP Location: Left arm, Patient Position: Lying)   Pulse 73   Temp 97.7 °F (36.5 °C) (Oral)   Resp 18   Ht 152.4 cm (60\")   Wt 40.2 kg (88 lb 9.6 oz)   SpO2 96%   BMI 17.30 kg/m²     General Appearance:    Alert, cooperative, no distress   Head:    Normocephalic, without obvious abnormality, atraumatic   Eyes:       Throat:   Lips, tongue, gums normal   Neck:   Supple, symmetrical, trachea midline, no JVD   Lungs:     Clear to auscultation bilaterally, respirations unlabored   Chest Wall:    No tenderness or deformity    Heart:    Regular rate and rhythm, S1 and S2 normal, no murmur,no  rub or gallop   Abdomen:    "  Soft, nontender, bowel sounds active, no masses, no organomegaly    Extremities:   Extremities normal, atraumatic, no cyanosis or edema   Pulses:      Skin:   Skin is warm and dry,  no rashes or palpable lesions   Neurologic:   no focal deficits noted      Lab Results (last 72 hours)       Procedure Component Value Units Date/Time    Urine Culture - Urine, Urine, Clean Catch [[id number]]  (Abnormal) Collected: 06/09/25 1017    Specimen: Urine, Clean Catch Updated: 06/11/25 1112     Urine Culture >100,000 CFU/mL Escherichia coli    Narrative:      Colonization of the urinary tract without infection is common. Treatment is discouraged unless the patient is symptomatic, pregnant, or undergoing an invasive urologic procedure.    Renal Function Panel [[id number]]  (Abnormal) Collected: 06/11/25 0428    Specimen: Blood Updated: 06/11/25 0531     Glucose 81 mg/dL      BUN 54.0 mg/dL      Creatinine 2.14 mg/dL      Sodium 137 mmol/L      Potassium 4.2 mmol/L      Chloride 97 mmol/L      CO2 29.8 mmol/L      Calcium 8.8 mg/dL      Albumin 3.4 g/dL      Phosphorus 3.4 mg/dL      Anion Gap 10.2 mmol/L      BUN/Creatinine Ratio 25.2     eGFR 21.3 mL/min/1.73     Narrative:      GFR Categories in Chronic Kidney Disease (CKD)              GFR Category          GFR (mL/min/1.73)    Interpretation  G1                    90 or greater        Normal or high (1)  G2                    60-89                Mild decrease (1)  G3a                   45-59                Mild to moderate decrease  G3b                   30-44                Moderate to severe decrease  G4                    15-29                Severe decrease  G5                    14 or less           Kidney failure    (1)In the absence of evidence of kidney disease, neither GFR category G1 or G2 fulfill the criteria for CKD.    eGFR calculation 2021 CKD-EPI creatinine equation, which does not include race as a factor    CBC & Differential [[id number]]  (Abnormal)  Collected: 06/11/25 0428    Specimen: Blood Updated: 06/11/25 0511    Narrative:      The following orders were created for panel order CBC & Differential.  Procedure                               Abnormality         Status                     ---------                               -----------         ------                     CBC Auto Differential[[id number]]        Abnormal            Final result                 Please view results for these tests on the individual orders.    CBC Auto Differential [[id number]]  (Abnormal) Collected: 06/11/25 0428    Specimen: Blood Updated: 06/11/25 0511     WBC 5.54 10*3/mm3      RBC 3.44 10*6/mm3      Hemoglobin 10.9 g/dL      Hematocrit 33.4 %      MCV 97.1 fL      MCH 31.7 pg      MCHC 32.6 g/dL      RDW 15.2 %      RDW-SD 54.1 fl      MPV 9.9 fL      Platelets 118 10*3/mm3      Neutrophil % 64.1 %      Lymphocyte % 23.1 %      Monocyte % 10.1 %      Eosinophil % 1.1 %      Basophil % 0.2 %      Immature Grans % 1.4 %      Neutrophils, Absolute 3.55 10*3/mm3      Lymphocytes, Absolute 1.28 10*3/mm3      Monocytes, Absolute 0.56 10*3/mm3      Eosinophils, Absolute 0.06 10*3/mm3      Basophils, Absolute 0.01 10*3/mm3      Immature Grans, Absolute 0.08 10*3/mm3      nRBC 0.0 /100 WBC     Basic Metabolic Panel [[id number]]  (Abnormal) Collected: 06/10/25 0405    Specimen: Blood Updated: 06/10/25 0457     Glucose 71 mg/dL      BUN 47.0 mg/dL      Creatinine 1.72 mg/dL      Sodium 134 mmol/L      Potassium 4.4 mmol/L      Comment: Slight hemolysis detected by analyzer. Result may be falsely elevated.        Chloride 100 mmol/L      CO2 24.8 mmol/L      Calcium 8.5 mg/dL      BUN/Creatinine Ratio 27.3     Anion Gap 9.2 mmol/L      eGFR 27.6 mL/min/1.73     Narrative:      GFR Categories in Chronic Kidney Disease (CKD)              GFR Category          GFR (mL/min/1.73)    Interpretation  G1                    90 or greater        Normal or high (1)  G2                    60-89                 Mild decrease (1)  G3a                   45-59                Mild to moderate decrease  G3b                   30-44                Moderate to severe decrease  G4                    15-29                Severe decrease  G5                    14 or less           Kidney failure    (1)In the absence of evidence of kidney disease, neither GFR category G1 or G2 fulfill the criteria for CKD.    eGFR calculation 2021 CKD-EPI creatinine equation, which does not include race as a factor    CK [[id number]]  (Normal) Collected: 06/10/25 0405    Specimen: Blood Updated: 06/10/25 0454     Creatine Kinase 53 U/L     Lipid Panel [[id number]] Collected: 06/10/25 0405    Specimen: Blood Updated: 06/10/25 0454     Total Cholesterol 132 mg/dL      Triglycerides 57 mg/dL      HDL Cholesterol 56 mg/dL      LDL Cholesterol  64 mg/dL      VLDL Cholesterol 12 mg/dL      LDL/HDL Ratio 1.15    Narrative:      Cholesterol Reference Ranges  (U.S. Department of Health and Human Services ATP III Classifications)    Desirable          <200 mg/dL  Borderline High    200-239 mg/dL  High Risk          >240 mg/dL      Triglyceride Reference Ranges  (U.S. Department of Health and Human Services ATP III Classifications)    Normal           <150 mg/dL  Borderline High  150-199 mg/dL  High             200-499 mg/dL  Very High        >500 mg/dL    HDL Reference Ranges  (U.S. Department of Health and Human Services ATP III Classifications)    Low     <40 mg/dl (major risk factor for CHD)  High    >60 mg/dl ('negative' risk factor for CHD)        LDL Reference Ranges  (U.S. Department of Health and Human Services ATP III Classifications)    Optimal          <100 mg/dL  Near Optimal     100-129 mg/dL  Borderline High  130-159 mg/dL  High             160-189 mg/dL  Very High        >189 mg/dL    LDL is calculated using the NIH LDL-C calculation.      CBC & Differential [[id number]]  (Abnormal) Collected: 06/10/25 0405    Specimen: Blood Updated:  06/10/25 0435    Narrative:      The following orders were created for panel order CBC & Differential.  Procedure                               Abnormality         Status                     ---------                               -----------         ------                     CBC Auto Differential[[id number]]        Abnormal            Final result                 Please view results for these tests on the individual orders.    CBC Auto Differential [[id number]]  (Abnormal) Collected: 06/10/25 0405    Specimen: Blood Updated: 06/10/25 0435     WBC 4.14 10*3/mm3      RBC 3.22 10*6/mm3      Hemoglobin 10.3 g/dL      Hematocrit 31.0 %      MCV 96.3 fL      MCH 32.0 pg      MCHC 33.2 g/dL      RDW 14.2 %      RDW-SD 49.9 fl      MPV 10.2 fL      Platelets 108 10*3/mm3      Neutrophil % 63.6 %      Lymphocyte % 23.9 %      Monocyte % 9.7 %      Eosinophil % 1.2 %      Basophil % 0.2 %      Immature Grans % 1.4 %      Neutrophils, Absolute 2.63 10*3/mm3      Lymphocytes, Absolute 0.99 10*3/mm3      Monocytes, Absolute 0.40 10*3/mm3      Eosinophils, Absolute 0.05 10*3/mm3      Basophils, Absolute 0.01 10*3/mm3      Immature Grans, Absolute 0.06 10*3/mm3      nRBC 0.0 /100 WBC     Basic Metabolic Panel [[id number]]  (Abnormal) Collected: 06/09/25 1756    Specimen: Blood Updated: 06/09/25 1924     Glucose 100 mg/dL      BUN 49.0 mg/dL      Creatinine 1.90 mg/dL      Sodium 133 mmol/L      Potassium 4.7 mmol/L      Comment: Slight hemolysis detected by analyzer. Result may be falsely elevated.        Chloride 97 mmol/L      CO2 23.0 mmol/L      Calcium 9.0 mg/dL      BUN/Creatinine Ratio 25.8     Anion Gap 13.0 mmol/L      eGFR 24.5 mL/min/1.73     Narrative:      GFR Categories in Chronic Kidney Disease (CKD)              GFR Category          GFR (mL/min/1.73)    Interpretation  G1                    90 or greater        Normal or high (1)  G2                    60-89                Mild decrease (1)  G3a                    45-59                Mild to moderate decrease  G3b                   30-44                Moderate to severe decrease  G4                    15-29                Severe decrease  G5                    14 or less           Kidney failure    (1)In the absence of evidence of kidney disease, neither GFR category G1 or G2 fulfill the criteria for CKD.    eGFR calculation 2021 CKD-EPI creatinine equation, which does not include race as a factor    Urinalysis, Microscopic Only - Urine, Clean Catch [[id number]]  (Abnormal) Collected: 06/09/25 1017    Specimen: Urine, Clean Catch Updated: 06/09/25 1118     RBC, UA 3-5 /HPF      WBC, UA 21-50 /HPF      Bacteria, UA None Seen /HPF      Squamous Epithelial Cells, UA 0-2 /HPF      Hyaline Casts, UA 0-2 /LPF      Methodology Manual Light Microscopy    Osmolality, Urine - Urine, Clean Catch [[id number]] Collected: 06/09/25 1017    Specimen: Urine, Clean Catch Updated: 06/09/25 1100     Osmolality, Urine 377 mOsm/kg     Narrative:      Osmo Normal Reference Ranges:    Random:  mOsm/kg H2O, depending on fluid intake.  Random: >850 mOsm/kg H20, after 12 hour fluid restriction.    24 Hour: 300-900 mOsm/kg H2O.    Urinalysis With Microscopic If Indicated (No Culture) - Urine, Clean Catch [[id number]]  (Abnormal) Collected: 06/09/25 1017    Specimen: Urine, Clean Catch Updated: 06/09/25 1054     Color, UA Yellow     Appearance, UA Clear     pH, UA 6.5     Specific Gravity, UA 1.011     Glucose, UA Negative     Ketones, UA Negative     Bilirubin, UA Negative     Blood, UA Negative     Protein, UA Negative     Leuk Esterase, UA Moderate (2+)     Nitrite, UA Negative     Urobilinogen, UA 0.2 E.U./dL    Creatinine Urine Random (kidney function) GFR component - Urine, Clean Catch [[id number]] Collected: 06/09/25 1017    Specimen: Urine, Clean Catch Updated: 06/09/25 1048     Creatinine, Urine 22.4 mg/dL     Narrative:      Reference intervals for random urine have not been  "established.  Clinical usage is dependent upon physician's interpretation in combination with other laboratory tests.       Sodium, Urine, Random - Urine, Clean Catch [[id number]] Collected: 06/09/25 1017    Specimen: Urine, Clean Catch Updated: 06/09/25 1048     Sodium, Urine 108 mmol/L     Narrative:      Reference intervals for random urine have not been established.  Clinical usage is dependent upon physician's interpretation in combination with other laboratory tests.       Procalcitonin [[id number]]  (Normal) Collected: 06/09/25 0958    Specimen: Blood Updated: 06/09/25 1044     Procalcitonin 0.11 ng/mL     Narrative:      As a Marker for Sepsis (Non-Neonates):    1. <0.5 ng/mL represents a low risk of severe sepsis and/or septic shock.  2. >2 ng/mL represents a high risk of severe sepsis and/or septic shock.    As a Marker for Lower Respiratory Tract Infections that require antibiotic therapy:    PCT on Admission    Antibiotic Therapy       6-12 Hrs later    >0.5                Strongly Recommended  >0.25 - <0.5        Recommended   0.1 - 0.25          Discouraged              Remeasure/reassess PCT  <0.1                Strongly Discouraged     Remeasure/reassess PCT    As 28 day mortality risk marker: \"Change in Procalcitonin Result\" (>80% or <=80%) if Day 0 (or Day 1) and Day 4 values are available. Refer to [email or web address]    Change in PCT <=80%  A decrease of PCT levels below or equal to 80% defines a positive change in PCT test result representing a higher risk for 28-day all-cause mortality of patients diagnosed with severe sepsis for septic shock.    Change in PCT >80%  A decrease of PCT levels of more than 80% defines a negative change in PCT result representing a lower risk for 28-day all-cause mortality of patients diagnosed with severe sepsis or septic shock.       Magnesium [[id number]]  (Abnormal) Collected: 06/09/25 0958    Specimen: Blood Updated: 06/09/25 1039     Magnesium " 2.4 mg/dL     Uric Acid [[id number]]  (Normal) Collected: 06/09/25 0958    Specimen: Blood Updated: 06/09/25 1039     Uric Acid 5.5 mg/dL     TSH [[id number]]  (Abnormal) Collected: 06/08/25 1817    Specimen: Blood Updated: 06/09/25 0952     TSH 13.100 uIU/mL     Respiratory Panel PCR w/COVID-19(SARS-CoV-2) VANESSA/MAGI/TROY/PAD/COR/BOOKER In-House, NP Swab in UTM/VTM, 2 HR TAT - Swab, Nasopharynx [[id number]]  (Abnormal) Collected: 06/08/25 1840    Specimen: Swab from Nasopharynx Updated: 06/08/25 2008     ADENOVIRUS, PCR Not Detected     Coronavirus 229E Not Detected     Coronavirus HKU1 Not Detected     Coronavirus NL63 Not Detected     Coronavirus OC43 Not Detected     COVID19 Not Detected     Human Metapneumovirus Not Detected     Human Rhinovirus/Enterovirus Detected     Influenza A PCR Not Detected     Influenza B PCR Not Detected     Parainfluenza Virus 1 Not Detected     Parainfluenza Virus 2 Not Detected     Parainfluenza Virus 3 Not Detected     Parainfluenza Virus 4 Not Detected     RSV, PCR Not Detected     Bordetella pertussis pcr Not Detected     Bordetella parapertussis PCR Not Detected     Chlamydophila pneumoniae PCR Not Detected     Mycoplasma pneumo by PCR Not Detected    Narrative:      In the setting of a positive respiratory panel with a viral infection PLUS a negative procalcitonin without other underlying concern for bacterial infection, consider observing off antibiotics or discontinuation of antibiotics and continue supportive care. If the respiratory panel is positive for atypical bacterial infection (Bordetella pertussis, Chlamydophila pneumoniae, or Mycoplasma pneumoniae), consider antibiotic de-escalation to target atypical bacterial infection.    Carbamazepine Level, Total [[id number]]  (Normal) Collected: 06/08/25 1817    Specimen: Blood Updated: 06/08/25 1941     Carbamazepine Level 5.8 mcg/mL     High Sensitivity Troponin T 1Hr [[id number]]  (Abnormal) Collected: 06/08/25 1817    Specimen: Blood  Updated: 06/08/25 1847     HS Troponin T 20 ng/L      Troponin T Numeric Delta -1 ng/L      Troponin T % Delta -5    Narrative:      High Sensitive Troponin T Reference Range:  <14.0 ng/L- Negative Female for AMI  <22.0 ng/L- Negative Male for AMI  >=14 - Abnormal Female indicating possible myocardial injury.  >=22 - Abnormal Male indicating possible myocardial injury.   Clinicians would have to utilize clinical acumen, EKG, Troponin, and serial changes to determine if it is an Acute Myocardial Infarction or myocardial injury due to an underlying chronic condition.         Comprehensive Metabolic Panel [[id number]]  (Abnormal) Collected: 06/08/25 1715    Specimen: Blood from Arm, Left Updated: 06/08/25 1752     Glucose 93 mg/dL      BUN 47.0 mg/dL      Creatinine 1.63 mg/dL      Sodium 136 mmol/L      Potassium 5.2 mmol/L      Chloride 99 mmol/L      CO2 26.4 mmol/L      Calcium 9.6 mg/dL      Total Protein 7.1 g/dL      Albumin 4.3 g/dL      ALT (SGPT) 29 U/L      AST (SGOT) 24 U/L      Alkaline Phosphatase 124 U/L      Total Bilirubin 0.4 mg/dL      Globulin 2.8 gm/dL      A/G Ratio 1.5 g/dL      BUN/Creatinine Ratio 28.8     Anion Gap 10.6 mmol/L      eGFR 29.5 mL/min/1.73     Narrative:      GFR Categories in Chronic Kidney Disease (CKD)              GFR Category          GFR (mL/min/1.73)    Interpretation  G1                    90 or greater        Normal or high (1)  G2                    60-89                Mild decrease (1)  G3a                   45-59                Mild to moderate decrease  G3b                   30-44                Moderate to severe decrease  G4                    15-29                Severe decrease  G5                    14 or less           Kidney failure    (1)In the absence of evidence of kidney disease, neither GFR category G1 or G2 fulfill the criteria for CKD.    eGFR calculation 2021 CKD-EPI creatinine equation, which does not include race as a factor    BNP [[id number]]   (Abnormal) Collected: 06/08/25 1715    Specimen: Blood from Arm, Left Updated: 06/08/25 1752     proBNP 6,285.0 pg/mL     Narrative:      This assay is used as an aid in the diagnosis of individuals suspected of having heart failure. It can be used as an aid in the diagnosis of acute decompensated heart failure (ADHF) in patients presenting with signs and symptoms of ADHF to the emergency department (ED). In addition, NT-proBNP of <300 pg/mL indicates ADHF is not likely.    Age Range Result Interpretation  NT-proBNP Concentration (pg/mL:      <50             Positive            >450                   Gray                 300-450                    Negative             <300    50-75           Positive            >900                  Gray                300-900                  Negative            <300      >75             Positive            >1800                  Gray                300-1800                  Negative            <300    High Sensitivity Troponin T [[id number]]  (Abnormal) Collected: 06/08/25 1715    Specimen: Blood from Arm, Left Updated: 06/08/25 1752     HS Troponin T 21 ng/L     Narrative:      High Sensitive Troponin T Reference Range:  <14.0 ng/L- Negative Female for AMI  <22.0 ng/L- Negative Male for AMI  >=14 - Abnormal Female indicating possible myocardial injury.  >=22 - Abnormal Male indicating possible myocardial injury.   Clinicians would have to utilize clinical acumen, EKG, Troponin, and serial changes to determine if it is an Acute Myocardial Infarction or myocardial injury due to an underlying chronic condition.         CBC & Differential [[id number]]  (Abnormal) Collected: 06/08/25 1715    Specimen: Blood from Arm, Left Updated: 06/08/25 1732    Narrative:      The following orders were created for panel order CBC & Differential.  Procedure                               Abnormality         Status                     ---------                               -----------         ------                      CBC Auto Differential[[id number]]        Abnormal            Final result                 Please view results for these tests on the individual orders.    CBC Auto Differential [[id number]]  (Abnormal) Collected: 06/08/25 1715    Specimen: Blood from Arm, Left Updated: 06/08/25 1732     WBC 5.96 10*3/mm3      RBC 3.75 10*6/mm3      Hemoglobin 11.9 g/dL      Hematocrit 36.3 %      MCV 96.8 fL      MCH 31.7 pg      MCHC 32.8 g/dL      RDW 14.7 %      RDW-SD 52.1 fl      MPV 9.9 fL      Platelets 158 10*3/mm3      Neutrophil % 77.1 %      Lymphocyte % 11.6 %      Monocyte % 9.1 %      Eosinophil % 0.3 %      Basophil % 0.2 %      Immature Grans % 1.7 %      Neutrophils, Absolute 4.60 10*3/mm3      Lymphocytes, Absolute 0.69 10*3/mm3      Monocytes, Absolute 0.54 10*3/mm3      Eosinophils, Absolute 0.02 10*3/mm3      Basophils, Absolute 0.01 10*3/mm3      Immature Grans, Absolute 0.10 10*3/mm3     [place] Draw [[id number]] Collected: 06/08/25 1715    Specimen: Blood from Arm, Left Updated: 06/08/25 1731    Narrative:      The following orders were created for panel order [place] Draw.  Procedure                               Abnormality         Status                     ---------                               -----------         ------                     Green Top (Gel)[[id number]]                                  Final result               Lavender Top[[id number]]                                     Final result               Gold Top - [facility][[id number]]                                   Final result               Light Blue Top[[id number]]                                   Final result                 Please view results for these tests on the individual orders.    Green Top (Gel) [[id number]] Collected: 06/08/25 1715    Specimen: Blood from Arm, Left Updated: 06/08/25 1731     Extra Tube Hold for add-ons.     Comment: Auto resulted.       Lavender Top [[id number]] Collected: 06/08/25 1715    Specimen: Blood  "from Arm, Left Updated: 06/08/25 1731     Extra Tube hold for add-on     Comment: Auto resulted       Gold Top - [facility] [[id number]] Collected: 06/08/25 1715    Specimen: Blood from Arm, Left Updated: 06/08/25 1731     Extra Tube Hold for add-ons.     Comment: Auto resulted.       Light Blue Top [[id number]] Collected: 06/08/25 1715    Specimen: Blood from Arm, Left Updated: 06/08/25 1731     Extra Tube Hold for add-ons.     Comment: Auto resulted             Data Review:  Results from last 7 days   Lab Units 06/11/25  0428 06/10/25  0405 06/09/25  1756   SODIUM mmol/L 137 134* 133*   POTASSIUM mmol/L 4.2 4.4 4.7   CHLORIDE mmol/L 97* 100 97*   CO2 mmol/L 29.8* 24.8 23.0   BUN mg/dL 54.0* 47.0* 49.0*   CREATININE mg/dL 2.14* 1.72* 1.90*   GLUCOSE mg/dL 81 71 100*   CALCIUM mg/dL 8.8 8.5 9.0     Results from last 7 days   Lab Units 06/11/25  0428 06/10/25  0405 06/08/25  1715   WBC 10*3/mm3 5.54 4.14 5.96   HEMOGLOBIN g/dL 10.9* 10.3* 11.9*   HEMATOCRIT % 33.4* 31.0* 36.3   PLATELETS 10*3/mm3 118* 108* 158     Results from last 7 days   Lab Units 06/08/25  1817   TSH uIU/mL 13.100*         Lab Results   Lab Value Date/Time    TROPONINT 20 (H) 06/08/2025 1817    TROPONINT 21 (H) 06/08/2025 1715     Results from last 7 days   Lab Units 06/10/25  0405   CHOLESTEROL mg/dL 132   TRIGLYCERIDES mg/dL 57   HDL CHOL mg/dL 56   LDL CHOL mg/dL 64     Results from last 7 days   Lab Units 06/08/25  1715 06/04/25  1507   ALK PHOS U/L 124* 104   BILIRUBIN mg/dL 0.4 0.5   ALT (SGPT) U/L 29 25   AST (SGOT) U/L 24 26     Results from last 7 days   Lab Units 06/08/25  1817   TSH uIU/mL 13.100*         No results found for: \"POCGLU\"        Past Medical History:   Diagnosis Date    Anemia     Arthritis     Atrial fibrillation     Intermittently    Colon cancer 2012    Stage III    Disease of thyroid gland     hypothyroid    Drug therapy 2012    pill for 6 months    H/O Herpes simplex     virus of the lip    Hyperlipidemia     Hypertension     " Intervertebral disk disease     Polio     Trigeminal neuralgia        Assessment:  Active Hospital Problems    Diagnosis  POA    **Shortness of breath [R06.02]  Yes    Moderate protein-calorie malnutrition [E44.0]  Yes    Rhinovirus infection [B34.8]  Yes    Weakness [R53.1]  Yes    Falls [R29.6]  Not Applicable    A-fib [I48.91]  Yes    Essential hypertension [I10]  Yes    Acute on chronic diastolic CHF (congestive heart failure) [I50.33]  Yes    Hypothyroidism (acquired) [E03.9]  Yes    Hyperlipidemia [E78.5]  Yes    Acute kidney failure [N17.9]  Yes    Chronic kidney failure, stage 3 (moderate) [N18.30]  Yes    Anemia [D64.9]  Yes    Mitral regurgitation [I34.0]  Yes      Resolved Hospital Problems   No resolved problems to display.       Plan:  Continue with medical treatment.  Follow-up on labs and cultures.  Start antibiotics for UTI.  Cardiology and nephrology consults noted and they are adjusting medication.  DC planning.  Likely will need skilled nursing unit for rehab.    Ashish Buckley MD  6/11/2025  12:06 EDT

## 2025-06-11 NOTE — CASE MANAGEMENT/SOCIAL WORK
Continued Stay Note  [facility]     Patient Name: Inge Borjas  MRN: [id number]  Today's Date: 2025    Admit Date: 2025    Plan: DC to [facility], [facility] will transport   Discharge Plan       Row Name 25 1340       Plan    Plan DC to [facility], [facility] will transport    Plan Comments Met with pt at bedside. No changes in current treatment plan. Pt hopeful for dc tomorrow (), as that is her 's . Pt will be dc to [facility], and [facility] will transprot. No other needs voiced. Advised that CCP is available should any further needs arise.                   Discharge Codes    No documentation.                 Expected Discharge Date and Time       Expected Discharge Date Expected Discharge Time    2025               Lor Coombs RN

## 2025-06-12 VITALS
TEMPERATURE: 98.3 F | HEIGHT: 60 IN | WEIGHT: 87.74 LBS | OXYGEN SATURATION: 95 % | HEART RATE: 87 BPM | SYSTOLIC BLOOD PRESSURE: 152 MMHG | DIASTOLIC BLOOD PRESSURE: 87 MMHG | RESPIRATION RATE: 18 BRPM | BODY MASS INDEX: 17.23 KG/M2

## 2025-06-12 PROBLEM — N39.0 UTI (URINARY TRACT INFECTION): Status: ACTIVE | Noted: 2025-06-12

## 2025-06-12 LAB
ALBUMIN SERPL-MCNC: 3.4 G/DL (ref 3.5–5.2)
ANION GAP SERPL CALCULATED.3IONS-SCNC: 11 MMOL/L (ref 5–15)
BACTERIA SPEC AEROBE CULT: ABNORMAL
BASOPHILS # BLD AUTO: 0.01 10*3/MM3 (ref 0–0.2)
BASOPHILS NFR BLD AUTO: 0.2 % (ref 0–1.5)
BUN SERPL-MCNC: 59 MG/DL (ref 8–23)
BUN/CREAT SERPL: 30.9 (ref 7–25)
CALCIUM SPEC-SCNC: 8.9 MG/DL (ref 8.2–9.6)
CHLORIDE SERPL-SCNC: 94 MMOL/L (ref 98–107)
CO2 SERPL-SCNC: 27 MMOL/L (ref 22–29)
CREAT SERPL-MCNC: 1.91 MG/DL (ref 0.57–1)
DEPRECATED RDW RBC AUTO: 54.7 FL (ref 37–54)
EGFRCR SERPLBLD CKD-EPI 2021: 24.4 ML/MIN/1.73
EOSINOPHIL # BLD AUTO: 0.07 10*3/MM3 (ref 0–0.4)
EOSINOPHIL NFR BLD AUTO: 1.2 % (ref 0.3–6.2)
ERYTHROCYTE [DISTWIDTH] IN BLOOD BY AUTOMATED COUNT: 15.7 % (ref 12.3–15.4)
GLUCOSE SERPL-MCNC: 89 MG/DL (ref 65–99)
HCT VFR BLD AUTO: 33.6 % (ref 34–46.6)
HGB BLD-MCNC: 10.8 G/DL (ref 12–15.9)
IMM GRANULOCYTES # BLD AUTO: 0.1 10*3/MM3 (ref 0–0.05)
IMM GRANULOCYTES NFR BLD AUTO: 1.8 % (ref 0–0.5)
LYMPHOCYTES # BLD AUTO: 1.13 10*3/MM3 (ref 0.7–3.1)
LYMPHOCYTES NFR BLD AUTO: 20 % (ref 19.6–45.3)
MCH RBC QN AUTO: 31.2 PG (ref 26.6–33)
MCHC RBC AUTO-ENTMCNC: 32.1 G/DL (ref 31.5–35.7)
MCV RBC AUTO: 97.1 FL (ref 79–97)
MONOCYTES # BLD AUTO: 0.55 10*3/MM3 (ref 0.1–0.9)
MONOCYTES NFR BLD AUTO: 9.8 % (ref 5–12)
NEUTROPHILS NFR BLD AUTO: 3.78 10*3/MM3 (ref 1.7–7)
NEUTROPHILS NFR BLD AUTO: 67 % (ref 42.7–76)
NRBC BLD AUTO-RTO: 0 /100 WBC (ref 0–0.2)
PHOSPHATE SERPL-MCNC: 3.1 MG/DL (ref 2.5–4.5)
PLATELET # BLD AUTO: 127 10*3/MM3 (ref 140–450)
PMV BLD AUTO: 9.8 FL (ref 6–12)
POTASSIUM SERPL-SCNC: 4.3 MMOL/L (ref 3.5–5.2)
RBC # BLD AUTO: 3.46 10*6/MM3 (ref 3.77–5.28)
SODIUM SERPL-SCNC: 132 MMOL/L (ref 136–145)
WBC NRBC COR # BLD AUTO: 5.64 10*3/MM3 (ref 3.4–10.8)

## 2025-06-12 PROCEDURE — 94761 N-INVAS EAR/PLS OXIMETRY MLT: CPT

## 2025-06-12 PROCEDURE — 85025 COMPLETE CBC W/AUTO DIFF WBC: CPT | Performed by: INTERNAL MEDICINE

## 2025-06-12 PROCEDURE — 25010000002 HEPARIN (PORCINE) PER 1000 UNITS: Performed by: INTERNAL MEDICINE

## 2025-06-12 PROCEDURE — 94799 UNLISTED PULMONARY SVC/PX: CPT

## 2025-06-12 PROCEDURE — 80069 RENAL FUNCTION PANEL: CPT | Performed by: INTERNAL MEDICINE

## 2025-06-12 PROCEDURE — 94664 DEMO&/EVAL PT USE INHALER: CPT

## 2025-06-12 RX ORDER — CYCLOBENZAPRINE HCL 5 MG
5 TABLET ORAL 3 TIMES DAILY PRN
Start: 2025-06-12

## 2025-06-12 RX ORDER — TORSEMIDE 20 MG/1
20 TABLET ORAL DAILY
Start: 2025-06-12 | End: 2025-06-12 | Stop reason: HOSPADM

## 2025-06-12 RX ORDER — ACETAMINOPHEN 325 MG/1
650 TABLET ORAL EVERY 6 HOURS PRN
Start: 2025-06-12

## 2025-06-12 RX ORDER — CEPHALEXIN 500 MG/1
500 CAPSULE ORAL 2 TIMES DAILY
Start: 2025-06-12 | End: 2025-06-15

## 2025-06-12 RX ORDER — LEVOTHYROXINE SODIUM 75 UG/1
75 TABLET ORAL
Start: 2025-06-12

## 2025-06-12 RX ADMIN — GUAIFENESIN 1200 MG: 600 TABLET, EXTENDED RELEASE ORAL at 08:32

## 2025-06-12 RX ADMIN — Medication 500 MG: at 08:32

## 2025-06-12 RX ADMIN — BRIMONIDINE TARTRATE 1 DROP: 2 SOLUTION/ DROPS OPHTHALMIC at 08:43

## 2025-06-12 RX ADMIN — ASPIRIN 81 MG: 81 TABLET, COATED ORAL at 08:37

## 2025-06-12 RX ADMIN — Medication 1 TABLET: at 08:32

## 2025-06-12 RX ADMIN — IPRATROPIUM BROMIDE AND ALBUTEROL SULFATE 3 ML: .5; 3 SOLUTION RESPIRATORY (INHALATION) at 07:40

## 2025-06-12 RX ADMIN — CARBAMAZEPINE 100 MG: 100 TABLET, EXTENDED RELEASE ORAL at 08:32

## 2025-06-12 RX ADMIN — FLUTICASONE PROPIONATE 2 SPRAY: 50 SPRAY, METERED NASAL at 08:37

## 2025-06-12 RX ADMIN — LEVOTHYROXINE SODIUM 75 MCG: 0.07 TABLET ORAL at 08:32

## 2025-06-12 RX ADMIN — ATENOLOL 100 MG: 25 TABLET ORAL at 08:44

## 2025-06-12 RX ADMIN — DORZOLAMIDE HCL 1 DROP: 20 SOLUTION/ DROPS OPHTHALMIC at 08:44

## 2025-06-12 RX ADMIN — HEPARIN SODIUM 5000 UNITS: 5000 INJECTION INTRAVENOUS; SUBCUTANEOUS at 08:32

## 2025-06-12 NOTE — CASE MANAGEMENT/SOCIAL WORK
Case Management Discharge Note      Final Note: DC to [facility], [facility] to transport         Selected Continued Care - Discharged on 6/12/2025 Admission date: 6/8/2025 - Discharge disposition: Skilled Nursing Facility (DC - External)      Destination Coordination complete.      Service Provider Services Address Phone Fax Patient Preferred    [facility] Skilled Nursing [address] [address] [phone] --              Durable Medical Equipment    No services have been selected for the patient.                Dialysis/Infusion    No services have been selected for the patient.                Home Medical Care    No services have been selected for the patient.                Therapy    No services have been selected for the patient.                Community Resources    No services have been selected for the patient.                Community & DME    No services have been selected for the patient.                    Selected Continued Care - Prior Encounters Includes continued care and service providers with selected services from prior encounters from 3/10/2025 to 6/12/2025      Discharged on 3/26/2025 Admission date: 3/25/2025 - Discharge disposition: Home or Self Care      Home Medical Care       Service Provider Services Address Phone Fax Patient Preferred    [facility] [address], [facility] [address] [place] [id number] [phone] --                          Transportation Services  Private: Car    Final Discharge Disposition Code: 03 - skilled nursing facility (SNF)

## 2025-06-12 NOTE — PLAN OF CARE
Goal Outcome Evaluation:              Outcome Evaluation: Pt to be DC'd today with [facility] picking her up to take her to husbands  then taking her to [facility].  IV removed, VSS, 1lpn via n/c.  Up with Assist x1 to [facility].  A&Ox4, Controlled Afib.

## 2025-06-12 NOTE — PROGRESS NOTES
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

## 2025-06-12 NOTE — CASE MANAGEMENT/SOCIAL WORK
Continued Stay Note  [facility]     Patient Name: Inge Borjas  MRN: [id number]  Today's Date: 6/12/2025    Admit Date: 6/8/2025    Plan: DC to [facility], [facility] to transport   Discharge Plan       Row Name 06/12/25 0951       Plan    Plan DC to [facility], [facility] to transport    Plan Comments Pt dc to [facility], [facility], Precious, present and will transport. Contacted [facility] and informed of disposition. Packet to nurse.                   Discharge Codes    No documentation.                 Expected Discharge Date and Time       Expected Discharge Date Expected Discharge Time    Jun 12, 2025               Lor Coombs RN

## 2025-06-12 NOTE — DISCHARGE SUMMARY
PHYSICIAN DISCHARGE SUMMARY                                                                        [facility]    Patient Identification:  Name: Inge Borjas  Age: 89 y.o.  Sex: female  :  1932  MRN: [id number]  Primary Care Physician: Angelica Anthony MD    Admit date: 2025  Discharge date and time:2025  Discharged Condition: good    Discharge Diagnoses:  Active Hospital Problems    Diagnosis  POA    **Shortness of breath [R06.02]  Yes    UTI (urinary tract infection) [N39.0]  Unknown    Moderate protein-calorie malnutrition [E44.0]  Yes    Rhinovirus infection [B34.8]  Yes    Weakness [R53.1]  Yes    Falls [R29.6]  Not Applicable    A-fib [I48.91]  Yes    Essential hypertension [I10]  Yes    Acute on chronic diastolic CHF (congestive heart failure) [I50.33]  Yes    Hypothyroidism (acquired) [E03.9]  Yes    Hyperlipidemia [E78.5]  Yes    Acute kidney failure [N17.9]  Yes    Chronic kidney failure, stage 3 (moderate) [N18.30]  Yes    Anemia [D64.9]  Yes    Mitral regurgitation [I34.0]  Yes      Resolved Hospital Problems   No resolved problems to display.          PMHX:   Past Medical History:   Diagnosis Date    Anemia     Arthritis     Atrial fibrillation     Intermittently    Colon cancer 2012    Stage III    Disease of thyroid gland     hypothyroid    Drug therapy 2012    pill for 6 months    H/O Herpes simplex     virus of the lip    Hyperlipidemia     Hypertension     Intervertebral disk disease     Polio     Trigeminal neuralgia      PSHX:   Past Surgical History:   Procedure Laterality Date    ARTHROSCOPY SHOULDER W/ OPEN ROTATOR CUFF REPAIR      BREAST BIOPSY Left     at least 25 years ago.    CARDIAC CATHETERIZATION  3/25/2025    Procedure: Right and Left Heart Cath;  Surgeon: Mukul Bloom MD;  Location: [facility] INVASIVE LOCATION;  Service: Cardiology;;    CARDIAC CATHETERIZATION N/A 3/25/2025     Procedure: Coronary angiography;  Surgeon: Mukul Bloom MD;  Location: [facility] INVASIVE LOCATION;  Service: Cardiology;  Laterality: N/A;    COLON RESECTION  08/2015    JOINT REPLACEMENT  1947    Elbow transplant and shoulder fusion    TONSILLECTOMY         Hospital Course: Inge Borjas  is a 89 year old female presented to the emergency room after she had several falls at home; she also had some shortness of breath; denies chest pain; no fever or chills; her  was at home with her and had a cardiac arrest; he was also transported to the ED where he passed away of admission.  The patient was admitted to the hospital and seen by nephrology and cardiology.  She was positive for rhinovirus and was pretty weak.  She had some adjustments of her medicines and the plan is to go to a skilled nursing facility for rehab.  She will follow-up with her primary care after released from rehab.  She will also follow-up with her specialist.  She will also finish a few more days of oral antibiotics for UTI.    Consults:     Consults       Date and Time Order Name Status Description    6/10/2025  2:03 PM Inpatient Nephrology Consult Completed     6/8/2025 11:31 PM Inpatient Cardiology Consult Completed           Results from last 7 days   Lab Units 06/12/25  0520   WBC 10*3/mm3 5.64   HEMOGLOBIN g/dL 10.8*   HEMATOCRIT % 33.6*   PLATELETS 10*3/mm3 127*     Results from last 7 days   Lab Units 06/12/25  0520   SODIUM mmol/L 132*   POTASSIUM mmol/L 4.3   CHLORIDE mmol/L 94*   CO2 mmol/L 27.0   BUN mg/dL 59.0*   CREATININE mg/dL 1.91*   GLUCOSE mg/dL 89   CALCIUM mg/dL 8.9     Significant Diagnostic Studies:   WBC   Date Value Ref Range Status   06/12/2025 5.64 3.40 - 10.80 10*3/mm3 Final     Hemoglobin   Date Value Ref Range Status   06/12/2025 10.8 (L) 12.0 - 15.9 g/dL Final     Hematocrit   Date Value Ref Range Status   06/12/2025 33.6 (L) 34.0 - 46.6 % Final     Platelets   Date Value Ref Range Status  "  06/12/2025 127 (L) 140 - 450 10*3/mm3 Final     Sodium   Date Value Ref Range Status   06/12/2025 132 (L) 136 - 145 mmol/L Final     Potassium   Date Value Ref Range Status   06/12/2025 4.3 3.5 - 5.2 mmol/L Final     Chloride   Date Value Ref Range Status   06/12/2025 94 (L) 98 - 107 mmol/L Final     CO2   Date Value Ref Range Status   06/12/2025 27.0 22.0 - 29.0 mmol/L Final     BUN   Date Value Ref Range Status   06/12/2025 59.0 (H) 8.0 - 23.0 mg/dL Final     Creatinine   Date Value Ref Range Status   06/12/2025 1.91 (H) 0.57 - 1.00 mg/dL Final     Glucose   Date Value Ref Range Status   06/12/2025 89 65 - 99 mg/dL Final     Calcium   Date Value Ref Range Status   06/12/2025 8.9 8.2 - 9.6 mg/dL Final     Magnesium   Date Value Ref Range Status   06/09/2025 2.4 (H) 1.7 - 2.3 mg/dL Final     Phosphorus   Date Value Ref Range Status   06/12/2025 3.1 2.5 - 4.5 mg/dL Final     No results found for: \"AST\", \"ALT\", \"ALKPHOS\"  No results found for: \"APTT\", \"INR\"  Color, UA   Date Value Ref Range Status   06/09/2025 Yellow Yellow, Straw Final     Appearance, UA   Date Value Ref Range Status   06/09/2025 Clear Clear Final     pH, UA   Date Value Ref Range Status   06/09/2025 6.5 5.0 - 8.0 Final     Glucose, UA   Date Value Ref Range Status   06/09/2025 Negative Negative Final     Ketones, UA   Date Value Ref Range Status   06/09/2025 Negative Negative Final     Blood, UA   Date Value Ref Range Status   06/09/2025 Negative Negative Final     Leuk Esterase, UA   Date Value Ref Range Status   06/09/2025 Moderate (2+) (A) Negative Final     Bilirubin, UA   Date Value Ref Range Status   06/09/2025 Negative Negative Final     Urobilinogen, UA   Date Value Ref Range Status   06/09/2025 0.2 E.U./dL 0.2 - 1.0 E.U./dL Final     RBC, UA   Date Value Ref Range Status   06/09/2025 3-5 (A) None Seen, 0-2 /HPF Final     WBC, UA   Date Value Ref Range Status   06/09/2025 21-50 (A) None Seen, 0-2 /HPF Final     Bacteria, UA   Date Value " "Ref Range Status   06/09/2025 None Seen None Seen /HPF Final     No results found for: \"TROPONINT\", \"TROPONINI\", \"BNP\"  No components found for: \"HGBA1C;2\"  No components found for: \"TSH;2\"  Imaging Results (All)       Procedure Component Value Units Date/Time    CT Chest Without Contrast Diagnostic [[id number]] Collected: 06/08/25 2102     Updated: 06/08/25 2111    Narrative:      CT OF THE CHEST WITHOUT CONTRAST     HISTORY: Frequent falls. Shortness of breath.     COMPARISON: None available.     TECHNIQUE: Axial CT imaging was obtained through the thorax. No IV  contrast was administered.     FINDINGS:  Old bilateral rib fractures are noted. The patient also has compression  deformity of T10, which was also present on exam from November 14, 2024.  There is thoracic scoliosis, with convexity to the left. There are  advanced degenerative changes of the right shoulder. There is biapical  scarring. Mediastinal lymph nodes do not appear pathologically enlarged.  The heart is enlarged. There is some interlobular septal thickening, as  well as trace right pleural effusion and small left pleural effusion.  Appearance suggests congestive heart failure. There is scarring noted at  the lung bases. The thoracic aorta is normal in caliber. There is  enlargement of the main pulmonary artery, which can be seen in the  setting of pulmonary arterial hypertension. Thyroid gland, trachea, and  esophagus are within normal limits. Images through the upper abdomen  demonstrate a left renal cyst. Patient does appear to have some body  wall edema.       Impression:         1. No acute traumatic injury identified.  2. Cardiomegaly, interlobular septal thickening, and small effusions,  suggesting congestive heart failure.     Radiation dose reduction techniques were utilized, including automated  exposure control and exposure modulation based on body size.        This report was finalized on 6/8/2025 9:08 PM by Dr. Neela Arvizu M.D " on Workstation: [id number]       CT Cervical Spine Without Contrast [[id number]] Collected: 06/08/25 2058     Updated: 06/08/25 2105    Narrative:      CT OF THE CERVICAL SPINE     HISTORY: Neck pain. Multiple falls.     COMPARISON: None available.     TECHNIQUE: Axial CT imaging was obtained through the cervical spine.  Coronal and sagittal reformatted images were obtained.     FINDINGS:  No acute fracture or subluxation of the cervical spine is seen. There is  anterolisthesis of C3 on C4 and C4 on C5. There is some retrolisthesis  of C5 on C6. Intervertebral disc space narrowing is most significant at  C5-C6 and C6-C7. There is no prevertebral soft tissue swelling.  Moderately Cini significant canal stenosis at any level. Neuroforaminal  narrowing is most significant on the left at C5-C6.       Impression:      No acute fracture or subluxation identified.     Radiation dose reduction techniques were utilized, including automated  exposure control and exposure modulation based on body size.        This report was finalized on 6/8/2025 9:02 PM by Dr. Neela Arvizu M.D on Workstation: [id number]       CT Head Without Contrast [[id number]] Collected: 06/08/25 2054     Updated: 06/08/25 2101    Narrative:      CT OF THE HEAD WITHOUT CONTRAST     HISTORY: Multiple falls     COMPARISON: June 4, 2025     TECHNIQUE: Axial CT imaging was obtained through the brain. No IV  contrast was administered.     FINDINGS:  No acute intracranial hemorrhage is seen. There is atrophy. There is  periventricular and deep white matter microangiopathic change. There is  some encephalomalacia suspected within the left middle cranial fossa. No  calvarial fracture is seen. The paranasal sinuses and mastoid air cells  appear clear. There are asymmetric degenerative changes of the right  TMJ. Left globe appears small and distorted.       Impression:      No acute intracranial findings.     Radiation dose reduction techniques were  utilized, including automated  exposure control and exposure modulation based on body size.        This report was finalized on 6/8/2025 8:58 PM by Dr. Neela Arvizu M.D on Workstation: [id number]       XR Chest 1 View [[id number]] Collected: 06/08/25 1740     Updated: 06/08/25 1746    Narrative:      XR CHEST 1 VW-        INDICATION: Shortness of air     COMPARISON: Chest radiograph June 4, 2025     TECHNIQUE: 1 view chest     FINDINGS:      Calcified pulmonary nodules in the left lower lung, consistent with  prior granulomatous infection. Linear opacities at the left lung base.  No effusions. Enlarged cardiac silhouette. Calcified mediastinal and  left hilar lymph nodes, consistent with prior granulomatous infection.  Old right-sided rib fractures. Left humeral head anchor and suspected  old Hill-Sachs deformity. Right glenohumeral arthropathy, incompletely  imaged. Thoracic dextrocurvature.       Impression:         1. Stable chest.  2. Cardiomegaly.  3. Subsegmental atelectasis or scarring suspected at the left lung base  4. Old right rib cage trauma     This report was finalized on 6/8/2025 5:43 PM by Dr. Curly Cee M.D  on Workstation: [id number]             Lab Results (last 7 days)       Procedure Component Value Units Date/Time    Renal Function Panel [[id number]]  (Abnormal) Collected: 06/12/25 0520    Specimen: Blood Updated: 06/12/25 0606     Glucose 89 mg/dL      BUN 59.0 mg/dL      Creatinine 1.91 mg/dL      Sodium 132 mmol/L      Potassium 4.3 mmol/L      Chloride 94 mmol/L      CO2 27.0 mmol/L      Calcium 8.9 mg/dL      Albumin 3.4 g/dL      Phosphorus 3.1 mg/dL      Anion Gap 11.0 mmol/L      BUN/Creatinine Ratio 30.9     eGFR 24.4 mL/min/1.73     Narrative:      GFR Categories in Chronic Kidney Disease (CKD)              GFR Category          GFR (mL/min/1.73)    Interpretation  G1                    90 or greater        Normal or high (1)  G2                    60-89                 Mild decrease (1)  G3a                   45-59                Mild to moderate decrease  G3b                   30-44                Moderate to severe decrease  G4                    15-29                Severe decrease  G5                    14 or less           Kidney failure    (1)In the absence of evidence of kidney disease, neither GFR category G1 or G2 fulfill the criteria for CKD.    eGFR calculation 2021 CKD-EPI creatinine equation, which does not include race as a factor    CBC & Differential [[id number]]  (Abnormal) Collected: 06/12/25 0520    Specimen: Blood Updated: 06/12/25 0546    Narrative:      The following orders were created for panel order CBC & Differential.  Procedure                               Abnormality         Status                     ---------                               -----------         ------                     CBC Auto Differential[[id number]]        Abnormal            Final result                 Please view results for these tests on the individual orders.    CBC Auto Differential [[id number]]  (Abnormal) Collected: 06/12/25 0520    Specimen: Blood Updated: 06/12/25 0546     WBC 5.64 10*3/mm3      RBC 3.46 10*6/mm3      Hemoglobin 10.8 g/dL      Hematocrit 33.6 %      MCV 97.1 fL      MCH 31.2 pg      MCHC 32.1 g/dL      RDW 15.7 %      RDW-SD 54.7 fl      MPV 9.8 fL      Platelets 127 10*3/mm3      Neutrophil % 67.0 %      Lymphocyte % 20.0 %      Monocyte % 9.8 %      Eosinophil % 1.2 %      Basophil % 0.2 %      Immature Grans % 1.8 %      Neutrophils, Absolute 3.78 10*3/mm3      Lymphocytes, Absolute 1.13 10*3/mm3      Monocytes, Absolute 0.55 10*3/mm3      Eosinophils, Absolute 0.07 10*3/mm3      Basophils, Absolute 0.01 10*3/mm3      Immature Grans, Absolute 0.10 10*3/mm3      nRBC 0.0 /100 WBC     Urine Culture - Urine, Urine, Clean Catch [[id number]]  (Abnormal) Collected: 06/09/25 1017    Specimen: Urine, Clean Catch Updated: 06/11/25 1112     Urine Culture >100,000  CFU/mL Escherichia coli    Narrative:      Colonization of the urinary tract without infection is common. Treatment is discouraged unless the patient is symptomatic, pregnant, or undergoing an invasive urologic procedure.    Renal Function Panel [[id number]]  (Abnormal) Collected: 06/11/25 0428    Specimen: Blood Updated: 06/11/25 0531     Glucose 81 mg/dL      BUN 54.0 mg/dL      Creatinine 2.14 mg/dL      Sodium 137 mmol/L      Potassium 4.2 mmol/L      Chloride 97 mmol/L      CO2 29.8 mmol/L      Calcium 8.8 mg/dL      Albumin 3.4 g/dL      Phosphorus 3.4 mg/dL      Anion Gap 10.2 mmol/L      BUN/Creatinine Ratio 25.2     eGFR 21.3 mL/min/1.73     Narrative:      GFR Categories in Chronic Kidney Disease (CKD)              GFR Category          GFR (mL/min/1.73)    Interpretation  G1                    90 or greater        Normal or high (1)  G2                    60-89                Mild decrease (1)  G3a                   45-59                Mild to moderate decrease  G3b                   30-44                Moderate to severe decrease  G4                    15-29                Severe decrease  G5                    14 or less           Kidney failure    (1)In the absence of evidence of kidney disease, neither GFR category G1 or G2 fulfill the criteria for CKD.    eGFR calculation 2021 CKD-EPI creatinine equation, which does not include race as a factor    CBC & Differential [[id number]]  (Abnormal) Collected: 06/11/25 0428    Specimen: Blood Updated: 06/11/25 0511    Narrative:      The following orders were created for panel order CBC & Differential.  Procedure                               Abnormality         Status                     ---------                               -----------         ------                     CBC Auto Differential[[id number]]        Abnormal            Final result                 Please view results for these tests on the individual orders.    CBC Auto Differential [[id number]]   (Abnormal) Collected: 06/11/25 0428    Specimen: Blood Updated: 06/11/25 0511     WBC 5.54 10*3/mm3      RBC 3.44 10*6/mm3      Hemoglobin 10.9 g/dL      Hematocrit 33.4 %      MCV 97.1 fL      MCH 31.7 pg      MCHC 32.6 g/dL      RDW 15.2 %      RDW-SD 54.1 fl      MPV 9.9 fL      Platelets 118 10*3/mm3      Neutrophil % 64.1 %      Lymphocyte % 23.1 %      Monocyte % 10.1 %      Eosinophil % 1.1 %      Basophil % 0.2 %      Immature Grans % 1.4 %      Neutrophils, Absolute 3.55 10*3/mm3      Lymphocytes, Absolute 1.28 10*3/mm3      Monocytes, Absolute 0.56 10*3/mm3      Eosinophils, Absolute 0.06 10*3/mm3      Basophils, Absolute 0.01 10*3/mm3      Immature Grans, Absolute 0.08 10*3/mm3      nRBC 0.0 /100 WBC     Basic Metabolic Panel [[id number]]  (Abnormal) Collected: 06/10/25 0405    Specimen: Blood Updated: 06/10/25 0457     Glucose 71 mg/dL      BUN 47.0 mg/dL      Creatinine 1.72 mg/dL      Sodium 134 mmol/L      Potassium 4.4 mmol/L      Comment: Slight hemolysis detected by analyzer. Result may be falsely elevated.        Chloride 100 mmol/L      CO2 24.8 mmol/L      Calcium 8.5 mg/dL      BUN/Creatinine Ratio 27.3     Anion Gap 9.2 mmol/L      eGFR 27.6 mL/min/1.73     Narrative:      GFR Categories in Chronic Kidney Disease (CKD)              GFR Category          GFR (mL/min/1.73)    Interpretation  G1                    90 or greater        Normal or high (1)  G2                    60-89                Mild decrease (1)  G3a                   45-59                Mild to moderate decrease  G3b                   30-44                Moderate to severe decrease  G4                    15-29                Severe decrease  G5                    14 or less           Kidney failure    (1)In the absence of evidence of kidney disease, neither GFR category G1 or G2 fulfill the criteria for CKD.    eGFR calculation 2021 CKD-EPI creatinine equation, which does not include race as a factor    CK [[id number]]   (Normal) Collected: 06/10/25 0405    Specimen: Blood Updated: 06/10/25 0454     Creatine Kinase 53 U/L     Lipid Panel [[id number]] Collected: 06/10/25 0405    Specimen: Blood Updated: 06/10/25 0454     Total Cholesterol 132 mg/dL      Triglycerides 57 mg/dL      HDL Cholesterol 56 mg/dL      LDL Cholesterol  64 mg/dL      VLDL Cholesterol 12 mg/dL      LDL/HDL Ratio 1.15    Narrative:      Cholesterol Reference Ranges  (U.S. Department of Health and Human Services ATP III Classifications)    Desirable          <200 mg/dL  Borderline High    200-239 mg/dL  High Risk          >240 mg/dL      Triglyceride Reference Ranges  (U.S. Department of Health and Human Services ATP III Classifications)    Normal           <150 mg/dL  Borderline High  150-199 mg/dL  High             200-499 mg/dL  Very High        >500 mg/dL    HDL Reference Ranges  (U.S. Department of Health and Human Services ATP III Classifications)    Low     <40 mg/dl (major risk factor for CHD)  High    >60 mg/dl ('negative' risk factor for CHD)        LDL Reference Ranges  (U.S. Department of Health and Human Services ATP III Classifications)    Optimal          <100 mg/dL  Near Optimal     100-129 mg/dL  Borderline High  130-159 mg/dL  High             160-189 mg/dL  Very High        >189 mg/dL    LDL is calculated using the NIH LDL-C calculation.      CBC & Differential [[id number]]  (Abnormal) Collected: 06/10/25 0405    Specimen: Blood Updated: 06/10/25 0435    Narrative:      The following orders were created for panel order CBC & Differential.  Procedure                               Abnormality         Status                     ---------                               -----------         ------                     CBC Auto Differential[[id number]]        Abnormal            Final result                 Please view results for these tests on the individual orders.    CBC Auto Differential [[id number]]  (Abnormal) Collected: 06/10/25 0405    Specimen:  Blood Updated: 06/10/25 0435     WBC 4.14 10*3/mm3      RBC 3.22 10*6/mm3      Hemoglobin 10.3 g/dL      Hematocrit 31.0 %      MCV 96.3 fL      MCH 32.0 pg      MCHC 33.2 g/dL      RDW 14.2 %      RDW-SD 49.9 fl      MPV 10.2 fL      Platelets 108 10*3/mm3      Neutrophil % 63.6 %      Lymphocyte % 23.9 %      Monocyte % 9.7 %      Eosinophil % 1.2 %      Basophil % 0.2 %      Immature Grans % 1.4 %      Neutrophils, Absolute 2.63 10*3/mm3      Lymphocytes, Absolute 0.99 10*3/mm3      Monocytes, Absolute 0.40 10*3/mm3      Eosinophils, Absolute 0.05 10*3/mm3      Basophils, Absolute 0.01 10*3/mm3      Immature Grans, Absolute 0.06 10*3/mm3      nRBC 0.0 /100 WBC     Basic Metabolic Panel [[id number]]  (Abnormal) Collected: 06/09/25 1756    Specimen: Blood Updated: 06/09/25 1924     Glucose 100 mg/dL      BUN 49.0 mg/dL      Creatinine 1.90 mg/dL      Sodium 133 mmol/L      Potassium 4.7 mmol/L      Comment: Slight hemolysis detected by analyzer. Result may be falsely elevated.        Chloride 97 mmol/L      CO2 23.0 mmol/L      Calcium 9.0 mg/dL      BUN/Creatinine Ratio 25.8     Anion Gap 13.0 mmol/L      eGFR 24.5 mL/min/1.73     Narrative:      GFR Categories in Chronic Kidney Disease (CKD)              GFR Category          GFR (mL/min/1.73)    Interpretation  G1                    90 or greater        Normal or high (1)  G2                    60-89                Mild decrease (1)  G3a                   45-59                Mild to moderate decrease  G3b                   30-44                Moderate to severe decrease  G4                    15-29                Severe decrease  G5                    14 or less           Kidney failure    (1)In the absence of evidence of kidney disease, neither GFR category G1 or G2 fulfill the criteria for CKD.    eGFR calculation 2021 CKD-EPI creatinine equation, which does not include race as a factor    Urinalysis, Microscopic Only - Urine, Clean Catch [[id number]]   (Abnormal) Collected: 06/09/25 1017    Specimen: Urine, Clean Catch Updated: 06/09/25 1118     RBC, UA 3-5 /HPF      WBC, UA 21-50 /HPF      Bacteria, UA None Seen /HPF      Squamous Epithelial Cells, UA 0-2 /HPF      Hyaline Casts, UA 0-2 /LPF      Methodology Manual Light Microscopy    Osmolality, Urine - Urine, Clean Catch [[id number]] Collected: 06/09/25 1017    Specimen: Urine, Clean Catch Updated: 06/09/25 1100     Osmolality, Urine 377 mOsm/kg     Narrative:      Osmo Normal Reference Ranges:    Random:  mOsm/kg H2O, depending on fluid intake.  Random: >850 mOsm/kg H20, after 12 hour fluid restriction.    24 Hour: 300-900 mOsm/kg H2O.    Urinalysis With Microscopic If Indicated (No Culture) - Urine, Clean Catch [[id number]]  (Abnormal) Collected: 06/09/25 1017    Specimen: Urine, Clean Catch Updated: 06/09/25 1054     Color, UA Yellow     Appearance, UA Clear     pH, UA 6.5     Specific Gravity, UA 1.011     Glucose, UA Negative     Ketones, UA Negative     Bilirubin, UA Negative     Blood, UA Negative     Protein, UA Negative     Leuk Esterase, UA Moderate (2+)     Nitrite, UA Negative     Urobilinogen, UA 0.2 E.U./dL    Creatinine Urine Random (kidney function) GFR component - Urine, Clean Catch [[id number]] Collected: 06/09/25 1017    Specimen: Urine, Clean Catch Updated: 06/09/25 1048     Creatinine, Urine 22.4 mg/dL     Narrative:      Reference intervals for random urine have not been established.  Clinical usage is dependent upon physician's interpretation in combination with other laboratory tests.       Sodium, Urine, Random - Urine, Clean Catch [[id number]] Collected: 06/09/25 1017    Specimen: Urine, Clean Catch Updated: 06/09/25 1048     Sodium, Urine 108 mmol/L     Narrative:      Reference intervals for random urine have not been established.  Clinical usage is dependent upon physician's interpretation in combination with other laboratory tests.       Procalcitonin [[id number]]  (Normal)  "Collected: 06/09/25 0958    Specimen: Blood Updated: 06/09/25 1044     Procalcitonin 0.11 ng/mL     Narrative:      As a Marker for Sepsis (Non-Neonates):    1. <0.5 ng/mL represents a low risk of severe sepsis and/or septic shock.  2. >2 ng/mL represents a high risk of severe sepsis and/or septic shock.    As a Marker for Lower Respiratory Tract Infections that require antibiotic therapy:    PCT on Admission    Antibiotic Therapy       6-12 Hrs later    >0.5                Strongly Recommended  >0.25 - <0.5        Recommended   0.1 - 0.25          Discouraged              Remeasure/reassess PCT  <0.1                Strongly Discouraged     Remeasure/reassess PCT    As 28 day mortality risk marker: \"Change in Procalcitonin Result\" (>80% or <=80%) if Day 0 (or Day 1) and Day 4 values are available. Refer to [email or web address]    Change in PCT <=80%  A decrease of PCT levels below or equal to 80% defines a positive change in PCT test result representing a higher risk for 28-day all-cause mortality of patients diagnosed with severe sepsis for septic shock.    Change in PCT >80%  A decrease of PCT levels of more than 80% defines a negative change in PCT result representing a lower risk for 28-day all-cause mortality of patients diagnosed with severe sepsis or septic shock.       Magnesium [[id number]]  (Abnormal) Collected: 06/09/25 0958    Specimen: Blood Updated: 06/09/25 1039     Magnesium 2.4 mg/dL     Uric Acid [[id number]]  (Normal) Collected: 06/09/25 0958    Specimen: Blood Updated: 06/09/25 1039     Uric Acid 5.5 mg/dL     TSH [[id number]]  (Abnormal) Collected: 06/08/25 1817    Specimen: Blood Updated: 06/09/25 0952     TSH 13.100 uIU/mL     Respiratory Panel PCR w/COVID-19(SARS-CoV-2) VANESSA/MAGI/TROY/PAD/COR/BOOKER In-House, NP Swab in UTM/VTM, 2 HR TAT - Swab, Nasopharynx [[id number]]  (Abnormal) Collected: 06/08/25 1840    Specimen: Swab from Nasopharynx Updated: 06/08/25 2008     ADENOVIRUS, PCR Not " Detected     Coronavirus 229E Not Detected     Coronavirus HKU1 Not Detected     Coronavirus NL63 Not Detected     Coronavirus OC43 Not Detected     COVID19 Not Detected     Human Metapneumovirus Not Detected     Human Rhinovirus/Enterovirus Detected     Influenza A PCR Not Detected     Influenza B PCR Not Detected     Parainfluenza Virus 1 Not Detected     Parainfluenza Virus 2 Not Detected     Parainfluenza Virus 3 Not Detected     Parainfluenza Virus 4 Not Detected     RSV, PCR Not Detected     Bordetella pertussis pcr Not Detected     Bordetella parapertussis PCR Not Detected     Chlamydophila pneumoniae PCR Not Detected     Mycoplasma pneumo by PCR Not Detected    Narrative:      In the setting of a positive respiratory panel with a viral infection PLUS a negative procalcitonin without other underlying concern for bacterial infection, consider observing off antibiotics or discontinuation of antibiotics and continue supportive care. If the respiratory panel is positive for atypical bacterial infection (Bordetella pertussis, Chlamydophila pneumoniae, or Mycoplasma pneumoniae), consider antibiotic de-escalation to target atypical bacterial infection.    Carbamazepine Level, Total [[id number]]  (Normal) Collected: 06/08/25 1817    Specimen: Blood Updated: 06/08/25 1941     Carbamazepine Level 5.8 mcg/mL     High Sensitivity Troponin T 1Hr [[id number]]  (Abnormal) Collected: 06/08/25 1817    Specimen: Blood Updated: 06/08/25 1847     HS Troponin T 20 ng/L      Troponin T Numeric Delta -1 ng/L      Troponin T % Delta -5    Narrative:      High Sensitive Troponin T Reference Range:  <14.0 ng/L- Negative Female for AMI  <22.0 ng/L- Negative Male for AMI  >=14 - Abnormal Female indicating possible myocardial injury.  >=22 - Abnormal Male indicating possible myocardial injury.   Clinicians would have to utilize clinical acumen, EKG, Troponin, and serial changes to determine if it is an Acute Myocardial Infarction or  myocardial injury due to an underlying chronic condition.         Comprehensive Metabolic Panel [[id number]]  (Abnormal) Collected: 06/08/25 1715    Specimen: Blood from Arm, Left Updated: 06/08/25 1752     Glucose 93 mg/dL      BUN 47.0 mg/dL      Creatinine 1.63 mg/dL      Sodium 136 mmol/L      Potassium 5.2 mmol/L      Chloride 99 mmol/L      CO2 26.4 mmol/L      Calcium 9.6 mg/dL      Total Protein 7.1 g/dL      Albumin 4.3 g/dL      ALT (SGPT) 29 U/L      AST (SGOT) 24 U/L      Alkaline Phosphatase 124 U/L      Total Bilirubin 0.4 mg/dL      Globulin 2.8 gm/dL      A/G Ratio 1.5 g/dL      BUN/Creatinine Ratio 28.8     Anion Gap 10.6 mmol/L      eGFR 29.5 mL/min/1.73     Narrative:      GFR Categories in Chronic Kidney Disease (CKD)              GFR Category          GFR (mL/min/1.73)    Interpretation  G1                    90 or greater        Normal or high (1)  G2                    60-89                Mild decrease (1)  G3a                   45-59                Mild to moderate decrease  G3b                   30-44                Moderate to severe decrease  G4                    15-29                Severe decrease  G5                    14 or less           Kidney failure    (1)In the absence of evidence of kidney disease, neither GFR category G1 or G2 fulfill the criteria for CKD.    eGFR calculation 2021 CKD-EPI creatinine equation, which does not include race as a factor    BNP [[id number]]  (Abnormal) Collected: 06/08/25 1715    Specimen: Blood from Arm, Left Updated: 06/08/25 1752     proBNP 6,285.0 pg/mL     Narrative:      This assay is used as an aid in the diagnosis of individuals suspected of having heart failure. It can be used as an aid in the diagnosis of acute decompensated heart failure (ADHF) in patients presenting with signs and symptoms of ADHF to the emergency department (ED). In addition, NT-proBNP of <300 pg/mL indicates ADHF is not likely.    Age Range Result Interpretation   NT-proBNP Concentration (pg/mL:      <50             Positive            >450                   Gray                 300-450                    Negative             <300    50-75           Positive            >900                  Gray                300-900                  Negative            <300      >75             Positive            >1800                  Gray                300-1800                  Negative            <300    High Sensitivity Troponin T [[id number]]  (Abnormal) Collected: 06/08/25 1715    Specimen: Blood from Arm, Left Updated: 06/08/25 1752     HS Troponin T 21 ng/L     Narrative:      High Sensitive Troponin T Reference Range:  <14.0 ng/L- Negative Female for AMI  <22.0 ng/L- Negative Male for AMI  >=14 - Abnormal Female indicating possible myocardial injury.  >=22 - Abnormal Male indicating possible myocardial injury.   Clinicians would have to utilize clinical acumen, EKG, Troponin, and serial changes to determine if it is an Acute Myocardial Infarction or myocardial injury due to an underlying chronic condition.         CBC & Differential [[id number]]  (Abnormal) Collected: 06/08/25 1715    Specimen: Blood from Arm, Left Updated: 06/08/25 1732    Narrative:      The following orders were created for panel order CBC & Differential.  Procedure                               Abnormality         Status                     ---------                               -----------         ------                     CBC Auto Differential[[id number]]        Abnormal            Final result                 Please view results for these tests on the individual orders.    CBC Auto Differential [[id number]]  (Abnormal) Collected: 06/08/25 1715    Specimen: Blood from Arm, Left Updated: 06/08/25 1732     WBC 5.96 10*3/mm3      RBC 3.75 10*6/mm3      Hemoglobin 11.9 g/dL      Hematocrit 36.3 %      MCV 96.8 fL      MCH 31.7 pg      MCHC 32.8 g/dL      RDW 14.7 %      RDW-SD 52.1 fl      MPV 9.9 fL       Platelets 158 10*3/mm3      Neutrophil % 77.1 %      Lymphocyte % 11.6 %      Monocyte % 9.1 %      Eosinophil % 0.3 %      Basophil % 0.2 %      Immature Grans % 1.7 %      Neutrophils, Absolute 4.60 10*3/mm3      Lymphocytes, Absolute 0.69 10*3/mm3      Monocytes, Absolute 0.54 10*3/mm3      Eosinophils, Absolute 0.02 10*3/mm3      Basophils, Absolute 0.01 10*3/mm3      Immature Grans, Absolute 0.10 10*3/mm3     [place] Draw [[id number]] Collected: 06/08/25 1715    Specimen: Blood from Arm, Left Updated: 06/08/25 1731    Narrative:      The following orders were created for panel order [place] Draw.  Procedure                               Abnormality         Status                     ---------                               -----------         ------                     Green Top (Gel)[[id number]]                                  Final result               Lavender Top[[id number]]                                     Final result               Gold Top - SST[[id number]]                                   Final result               Light Blue Top[[id number]]                                   Final result                 Please view results for these tests on the individual orders.    Green Top (Gel) [[id number]] Collected: 06/08/25 1715    Specimen: Blood from Arm, Left Updated: 06/08/25 1731     Extra Tube Hold for add-ons.     Comment: Auto resulted.       Lavender Top [[id number]] Collected: 06/08/25 1715    Specimen: Blood from Arm, Left Updated: 06/08/25 1731     Extra Tube hold for add-on     Comment: Auto resulted       Gold Top - SST [[id number]] Collected: 06/08/25 1715    Specimen: Blood from Arm, Left Updated: 06/08/25 1731     Extra Tube Hold for add-ons.     Comment: Auto resulted.       Light Blue Top [[id number]] Collected: 06/08/25 1715    Specimen: Blood from Arm, Left Updated: 06/08/25 1731     Extra Tube Hold for add-ons.     Comment: Auto resulted             /97 (BP Location: Left arm, Patient  "Position: Lying)   Pulse 80   Temp 97.5 °F (36.4 °C) (Oral)   Resp 16   Ht 152.4 cm (60\")   Wt 39.8 kg (87 lb 11.9 oz)   SpO2 98%   BMI 17.14 kg/m²     Discharge Exam:  General Appearance:    Alert, cooperative, no distress                          Head:    Normocephalic, without obvious abnormality, atraumatic                          Eyes:                            Throat:   Lips, tongue, gums normal                          Neck:   Supple, symmetrical, trachea midline, no JVD                        Lungs:     Clear to auscultation bilaterally, respirations unlabored                Chest Wall:    No tenderness or deformity                        Heart:    Regular rate and rhythm, S1 and S2 normal, no murmur,no  Rub  or gallop                  Abdomen:     Soft, non-tender, bowel sounds active, no masses, no                                                        organomegaly                  Extremities:   Extremities normal, atraumatic, no cyanosis or edema                             Skin:   Skin is warm and dry,  no rashes or palpable lesions                  Neurologic:   no focal deficits noted     Disposition:  Skilled nursing facility    Activity as tolerated    Diet as tolerated  Diet Order   Procedures    Diet: Cardiac; Healthy Heart (2-3 Na+); Fluid Consistency: Thin (IDDSI 0)       Patient Instructions:      Discharge Medications        New Medications        Instructions Start Date   acetaminophen 325 MG tablet  Commonly known as: TYLENOL  Replaces: acetaminophen 650 MG 8 hr tablet   650 mg, Oral, Every 6 Hours PRN      cephalexin 500 MG capsule  Commonly known as: KEFLEX   500 mg, Oral, 2 Times Daily      cyclobenzaprine 5 MG tablet  Commonly known as: FLEXERIL   5 mg, Oral, 3 Times Daily PRN             Changes to Medications        Instructions Start Date   levothyroxine 75 MCG tablet  Commonly known as: SYNTHROID, LEVOTHROID  What changed:   medication strength  how much to take   75 mcg, " Oral, Every Early Morning             Continue These Medications        Instructions Start Date   aspirin 81 MG EC tablet   81 mg, Daily      atenolol 100 MG tablet  Commonly known as: TENORMIN   Take 1 tablet (100 mg total) by mouth 2 (Two) Times a Day      brimonidine 0.2 % ophthalmic solution  Commonly known as: ALPHAGAN   1 drop, 2 Times Daily      calcium carbonate-vitamin d 600-400 MG-UNIT per tablet   1 tablet, Daily      carBAMazepine  MG 12 hr tablet  Commonly known as: TEGretol  XR   100 mg, Oral, 2 Times Daily      dorzolamide 2 % ophthalmic solution  Commonly known as: TRUSOPT   1 drop, 3 Times Daily      doxazosin 4 MG tablet  Commonly known as: CARDURA   Take 1 tablet by mouth every night at bedtime.      fluticasone 50 MCG/ACT nasal spray  Commonly known as: FLONASE   2 sprays, Daily      multivitamin with minerals tablet tablet   Take  by mouth.      spironolactone 25 MG tablet  Commonly known as: ALDACTONE   1 tablet, Daily             Stop These Medications      acetaminophen 650 MG 8 hr tablet  Commonly known as: TYLENOL  Replaced by: acetaminophen 325 MG tablet     furosemide 20 MG tablet  Commonly known as: LASIX     losartan 25 MG tablet  Commonly known as: COZAAR            No future appointments.   Contact information for follow-up providers       Angelica Anthony MD Follow up.    Specialty: Internal Medicine  Why: After released from rehab  Contact information:  [address]  [place] [address] [phone]               Angelica Anthony MD .    Specialty: Internal Medicine  Contact information:  [address] [address] [id number] [phone]                       Contact information for after-discharge care       Destination       [facility] [place] .    Service: Skilled Nursing  Contact information:  [address] [facility] [address] [phone]                                 Discharge Order (From  admission, onward)       Start     Ordered    06/12/25 0815  Discharge patient  Once        Expected Discharge Date: 06/12/25   Discharge Disposition: Skilled Nursing Facility (DC - External)   Physician of Record for Attribution - Please select from Treatment Team: ASHISH BUCKLEY [[id number]]   Review needed by CMO to determine Physician of Record: No      Question Answer Comment   Physician of Record for Attribution - Please select from Treatment Team ASHISH BUCKLEY    Review needed by CMO to determine Physician of Record No        06/12/25 0822                    Total time spent discharging patient including evaluation,post hospitalization follow up,  medication and post hospitalization instructions and education total time exceeds 30 minutes.    Signed:  Ashish Buckley MD  6/12/2025  08:33 EDT

## 2025-06-12 NOTE — PROGRESS NOTES
[facility] Progress Note      Patient Name: Inge Borjas  : 1932  MRN: [id number]  Primary Care Physician:  Angelica Anthony MD  Date of admission: 2025    Subjective     Interval History:   Acute kidney injury on CKD 3A.  Urine output 1600 cc on Aldactone only yesterday.  Weight down further.  Room air.  Tired today.  Being discharged to go to her 's  and then to rehab.  Review of Systems:   As noted above    Objective     Vitals:   Temp:  [97.3 °F (36.3 °C)-98.2 °F (36.8 °C)] 97.5 °F (36.4 °C)  Heart Rate:  [] 80  Resp:  [16-18] 16  BP: (114-144)/(75-97) 144/97  Flow (L/min) (Oxygen Therapy):  [1] 1    Intake/Output Summary (Last 24 hours) at 2025 0825  Last data filed at 2025 0454  Gross per 24 hour   Intake 480 ml   Output 1200 ml   Net -720 ml       Physical Exam:    General Appearance: Frail, elderly.  Lying on her side in bed.  Just awakened.  Skin: warm and dry  HEENT: oral mucosa dry , left periorbital edema.  Nonicteric sclera  Neck: supple, no JVD  Lungs: Improved today.  Better air movement.  Clear to auscultation anteriorly.  Heart: Irregularly irregular.  Abdomen: soft, nontender, nondistended. +bs  : no palpable bladder  Extremities: no edema  Neuro: normal speech and mental status     Scheduled Meds:     aspirin, 81 mg, Oral, Daily  atenolol, 100 mg, Oral, Q12H  brimonidine, 1 drop, Both Eyes, BID  calcium carbonate (oyster shell), 500 mg, Oral, Daily  carBAMazepine XR, 100 mg, Oral, BID  cefTRIAXone, 1,000 mg, Intravenous, Q24H  dorzolamide, 1 drop, Both Eyes, TID  fluticasone, 2 spray, Nasal, Daily  guaiFENesin, 1,200 mg, Oral, Q12H  heparin (porcine), 5,000 Units, Subcutaneous, Q12H  ipratropium-albuterol, 3 mL, Nebulization, 4x Daily - RT  levothyroxine, 75 mcg, Oral, Q AM  multivitamin with minerals, 1 tablet, Oral, Daily  terazosin, 5 mg, Oral, Nightly      IV Meds:        Results Reviewed:   I have personally reviewed  the results from the time of this admission to 6/12/2025 08:25 EDT     Results from last 7 days   Lab Units 06/12/25  0520 06/11/25  0428 06/10/25  0405 06/09/25  1756 06/08/25  1715   SODIUM mmol/L 132* 137 134*   < > 136   POTASSIUM mmol/L 4.3 4.2 4.4   < > 5.2   CHLORIDE mmol/L 94* 97* 100   < > 99   CO2 mmol/L 27.0 29.8* 24.8   < > 26.4   BUN mg/dL 59.0* 54.0* 47.0*   < > 47.0*   CREATININE mg/dL 1.91* 2.14* 1.72*   < > 1.63*   CALCIUM mg/dL 8.9 8.8 8.5   < > 9.6   BILIRUBIN mg/dL  --   --   --   --  0.4   ALK PHOS U/L  --   --   --   --  124*   ALT (SGPT) U/L  --   --   --   --  29   AST (SGOT) U/L  --   --   --   --  24   GLUCOSE mg/dL 89 81 71   < > 93    < > = values in this interval not displayed.       Estimated Creatinine Clearance: 11.8 mL/min (A) (by C-G formula based on SCr of 1.91 mg/dL (H)).    Results from last 7 days   Lab Units 06/12/25 0520 06/11/25 0428 06/09/25  0958   MAGNESIUM mg/dL  --   --  2.4*   PHOSPHORUS mg/dL 3.1 3.4  --        Results from last 7 days   Lab Units 06/09/25  0958   URIC ACID mg/dL 5.5       Results from last 7 days   Lab Units 06/12/25  0520 06/11/25  0428 06/10/25  0405 06/08/25  1715   WBC 10*3/mm3 5.64 5.54 4.14 5.96   HEMOGLOBIN g/dL 10.8* 10.9* 10.3* 11.9*   PLATELETS 10*3/mm3 127* 118* 108* 158             Assessment / Plan     ASSESSMENT:  Acute kidney injury on CKD 3A baseline creatinine 1.4-1.7.  GFR overestimated with her low muscle mass.  Underlying hypertensive nephrosclerosis.  Acute component likely due to cardiorenal with blood pressure lability.  Serum creatinine stable to a little better.  I would leave off of losartan.  Continue oral Aldactone but hold torsemide for.  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  Continue Aldactone but keep off torsemide.  Coronary artery disease.  Medical management.  Acute hypoxic respiratory failure rhinovirus infection.  Atrial  fibrillation.  Rate controlled.  Advanced age with frailty.  7.  Hypothyroidism on replacement.  TSH elevated.  Replacement dose increased.  8.  Hypertension with chronic kidney disease, labile.  Blood pressure 98 at 1 point yesterday afternoon after losartan.  Will leave her off of losartan.  With her frailty and propensity to falling would not try to lower her blood pressure further.  9.  Multiple falls at home.    PLAN:  Discontinue torsemide  Continue Aldactone  Keep off losartan.  Okay with renal for discharge.  Thank you for involving us in the care of Inge DARREN Borjas.  Please feel free to call with any questions.    Padmini Grimaldo MD  06/12/25  08:25 EDT    [facility]\""  [phone]    Please note that portions of this note were completed with a voice recognition program.

## 2025-06-12 NOTE — NURSING NOTE
Attempted first call for report to [facility] to ext [phone].  Left message.  [facility] is supposed to be here at anytime for transport.

## 2025-07-17 ENCOUNTER — APPOINTMENT (OUTPATIENT)
Dept: GENERAL RADIOLOGY | Facility: HOSPITAL | Age: OVER 89
DRG: 309 | End: 2025-07-17
Payer: MEDICARE

## 2025-07-17 ENCOUNTER — HOSPITAL ENCOUNTER (INPATIENT)
Facility: HOSPITAL | Age: OVER 89
LOS: 1 days | Discharge: HOME | DRG: 309 | End: 2025-07-19
Attending: EMERGENCY MEDICINE | Admitting: INTERNAL MEDICINE
Payer: MEDICARE

## 2025-07-17 DIAGNOSIS — I50.33 ACUTE ON CHRONIC DIASTOLIC CHF (CONGESTIVE HEART FAILURE): ICD-10-CM

## 2025-07-17 DIAGNOSIS — I48.20 CHRONIC A-FIB: ICD-10-CM

## 2025-07-17 DIAGNOSIS — I48.91 ATRIAL FIBRILLATION, UNSPECIFIED TYPE: Primary | ICD-10-CM

## 2025-07-17 DIAGNOSIS — D64.9 ANEMIA, UNSPECIFIED TYPE: ICD-10-CM

## 2025-07-17 DIAGNOSIS — T68.XXXA HYPOTHERMIA, INITIAL ENCOUNTER: ICD-10-CM

## 2025-07-17 DIAGNOSIS — I50.32 CHRONIC DIASTOLIC CHF (CONGESTIVE HEART FAILURE): ICD-10-CM

## 2025-07-17 LAB
ALBUMIN SERPL-MCNC: 4.1 G/DL (ref 3.5–5.2)
ALBUMIN/GLOB SERPL: 1.6 G/DL
ALP SERPL-CCNC: 97 U/L (ref 39–117)
ALT SERPL W P-5'-P-CCNC: 24 U/L (ref 1–33)
ANION GAP SERPL CALCULATED.3IONS-SCNC: 9 MMOL/L (ref 5–15)
AST SERPL-CCNC: 32 U/L (ref 1–32)
BACTERIA UR QL AUTO: ABNORMAL /HPF
BASOPHILS # BLD AUTO: 0.01 10*3/MM3 (ref 0–0.2)
BASOPHILS NFR BLD AUTO: 0.2 % (ref 0–1.5)
BILIRUB SERPL-MCNC: 0.6 MG/DL (ref 0–1.2)
BILIRUB UR QL STRIP: NEGATIVE
BUN SERPL-MCNC: 40 MG/DL (ref 8–23)
BUN/CREAT SERPL: 27.8 (ref 7–25)
CALCIUM SPEC-SCNC: 9.3 MG/DL (ref 8.2–9.6)
CHLORIDE SERPL-SCNC: 99 MMOL/L (ref 98–107)
CLARITY UR: CLEAR
CO2 SERPL-SCNC: 26 MMOL/L (ref 22–29)
COLOR UR: YELLOW
CORTIS SERPL-MCNC: 26.8 MCG/DL
CREAT SERPL-MCNC: 1.44 MG/DL (ref 0.57–1)
D-LACTATE SERPL-SCNC: 1.1 MMOL/L (ref 0.5–2)
DEPRECATED RDW RBC AUTO: 52.7 FL (ref 37–54)
EGFRCR SERPLBLD CKD-EPI 2021: 34.2 ML/MIN/1.73
EOSINOPHIL # BLD AUTO: 0.02 10*3/MM3 (ref 0–0.4)
EOSINOPHIL NFR BLD AUTO: 0.4 % (ref 0.3–6.2)
ERYTHROCYTE [DISTWIDTH] IN BLOOD BY AUTOMATED COUNT: 14.5 % (ref 12.3–15.4)
GLOBULIN UR ELPH-MCNC: 2.6 GM/DL
GLUCOSE SERPL-MCNC: 110 MG/DL (ref 65–99)
GLUCOSE UR STRIP-MCNC: NEGATIVE MG/DL
HCT VFR BLD AUTO: 35.1 % (ref 34–46.6)
HGB BLD-MCNC: 11.1 G/DL (ref 12–15.9)
HGB UR QL STRIP.AUTO: ABNORMAL
HOLD SPECIMEN: NORMAL
HOLD SPECIMEN: NORMAL
HYALINE CASTS UR QL AUTO: ABNORMAL /LPF
IMM GRANULOCYTES # BLD AUTO: 0.03 10*3/MM3 (ref 0–0.05)
IMM GRANULOCYTES NFR BLD AUTO: 0.5 % (ref 0–0.5)
KETONES UR QL STRIP: NEGATIVE
LEUKOCYTE ESTERASE UR QL STRIP.AUTO: ABNORMAL
LYMPHOCYTES # BLD AUTO: 0.7 10*3/MM3 (ref 0.7–3.1)
LYMPHOCYTES NFR BLD AUTO: 12.5 % (ref 19.6–45.3)
MCH RBC QN AUTO: 31.5 PG (ref 26.6–33)
MCHC RBC AUTO-ENTMCNC: 31.6 G/DL (ref 31.5–35.7)
MCV RBC AUTO: 99.7 FL (ref 79–97)
MONOCYTES # BLD AUTO: 0.47 10*3/MM3 (ref 0.1–0.9)
MONOCYTES NFR BLD AUTO: 8.4 % (ref 5–12)
NEUTROPHILS NFR BLD AUTO: 4.35 10*3/MM3 (ref 1.7–7)
NEUTROPHILS NFR BLD AUTO: 78 % (ref 42.7–76)
NITRITE UR QL STRIP: NEGATIVE
PH UR STRIP.AUTO: 8 [PH] (ref 5–8)
PLATELET # BLD AUTO: 107 10*3/MM3 (ref 140–450)
PMV BLD AUTO: 10.8 FL (ref 6–12)
POTASSIUM SERPL-SCNC: 5 MMOL/L (ref 3.5–5.2)
PROT SERPL-MCNC: 6.7 G/DL (ref 6–8.5)
PROT UR QL STRIP: ABNORMAL
RBC # BLD AUTO: 3.52 10*6/MM3 (ref 3.77–5.28)
RBC # UR STRIP: ABNORMAL /HPF
REF LAB TEST METHOD: ABNORMAL
SODIUM SERPL-SCNC: 134 MMOL/L (ref 136–145)
SP GR UR STRIP: 1.02 (ref 1–1.03)
SQUAMOUS #/AREA URNS HPF: ABNORMAL /HPF
T4 FREE SERPL-MCNC: 1.46 NG/DL (ref 0.92–1.68)
TSH SERPL DL<=0.05 MIU/L-ACNC: 6.12 UIU/ML (ref 0.27–4.2)
UROBILINOGEN UR QL STRIP: ABNORMAL
WBC # UR STRIP: ABNORMAL /HPF
WBC NRBC COR # BLD AUTO: 5.58 10*3/MM3 (ref 3.4–10.8)
WHOLE BLOOD HOLD COAG: NORMAL
WHOLE BLOOD HOLD SPECIMEN: NORMAL

## 2025-07-17 PROCEDURE — 71045 X-RAY EXAM CHEST 1 VIEW: CPT

## 2025-07-17 PROCEDURE — 81001 URINALYSIS AUTO W/SCOPE: CPT | Performed by: NURSE PRACTITIONER

## 2025-07-17 PROCEDURE — G0378 HOSPITAL OBSERVATION PER HR: HCPCS

## 2025-07-17 PROCEDURE — 99222 1ST HOSP IP/OBS MODERATE 55: CPT

## 2025-07-17 PROCEDURE — 93005 ELECTROCARDIOGRAM TRACING: CPT | Performed by: NURSE PRACTITIONER

## 2025-07-17 PROCEDURE — 84443 ASSAY THYROID STIM HORMONE: CPT | Performed by: INTERNAL MEDICINE

## 2025-07-17 PROCEDURE — 93010 ELECTROCARDIOGRAM REPORT: CPT | Performed by: INTERNAL MEDICINE

## 2025-07-17 PROCEDURE — 85025 COMPLETE CBC W/AUTO DIFF WBC: CPT | Performed by: NURSE PRACTITIONER

## 2025-07-17 PROCEDURE — 99291 CRITICAL CARE FIRST HOUR: CPT

## 2025-07-17 PROCEDURE — 87040 BLOOD CULTURE FOR BACTERIA: CPT | Performed by: NURSE PRACTITIONER

## 2025-07-17 PROCEDURE — 82533 TOTAL CORTISOL: CPT | Performed by: INTERNAL MEDICINE

## 2025-07-17 PROCEDURE — 72100 X-RAY EXAM L-S SPINE 2/3 VWS: CPT

## 2025-07-17 PROCEDURE — 83605 ASSAY OF LACTIC ACID: CPT | Performed by: NURSE PRACTITIONER

## 2025-07-17 PROCEDURE — 73502 X-RAY EXAM HIP UNI 2-3 VIEWS: CPT

## 2025-07-17 PROCEDURE — P9612 CATHETERIZE FOR URINE SPEC: HCPCS

## 2025-07-17 PROCEDURE — 36415 COLL VENOUS BLD VENIPUNCTURE: CPT

## 2025-07-17 PROCEDURE — 84439 ASSAY OF FREE THYROXINE: CPT | Performed by: INTERNAL MEDICINE

## 2025-07-17 PROCEDURE — 80053 COMPREHEN METABOLIC PANEL: CPT | Performed by: NURSE PRACTITIONER

## 2025-07-17 RX ORDER — ASPIRIN 81 MG/1
81 TABLET ORAL DAILY
Status: DISCONTINUED | OUTPATIENT
Start: 2025-07-17 | End: 2025-07-17

## 2025-07-17 RX ORDER — ASPIRIN 81 MG/1
81 TABLET ORAL NIGHTLY
Status: DISCONTINUED | OUTPATIENT
Start: 2025-07-17 | End: 2025-07-19 | Stop reason: HOSPADM

## 2025-07-17 RX ORDER — SODIUM CHLORIDE 0.9 % (FLUSH) 0.9 %
10 SYRINGE (ML) INJECTION AS NEEDED
Status: DISCONTINUED | OUTPATIENT
Start: 2025-07-17 | End: 2025-07-19 | Stop reason: HOSPADM

## 2025-07-17 RX ORDER — AMLODIPINE BESYLATE 5 MG/1
5 TABLET ORAL DAILY
COMMUNITY

## 2025-07-17 RX ORDER — AMLODIPINE BESYLATE 5 MG/1
5 TABLET ORAL DAILY
Status: DISCONTINUED | OUTPATIENT
Start: 2025-07-17 | End: 2025-07-19 | Stop reason: HOSPADM

## 2025-07-17 RX ORDER — FUROSEMIDE 20 MG/1
20 TABLET ORAL DAILY
COMMUNITY

## 2025-07-17 RX ORDER — SODIUM CHLORIDE 9 MG/ML
40 INJECTION, SOLUTION INTRAVENOUS AS NEEDED
Status: DISCONTINUED | OUTPATIENT
Start: 2025-07-17 | End: 2025-07-19 | Stop reason: HOSPADM

## 2025-07-17 RX ORDER — ONDANSETRON 2 MG/ML
4 INJECTION INTRAMUSCULAR; INTRAVENOUS EVERY 6 HOURS PRN
Status: DISCONTINUED | OUTPATIENT
Start: 2025-07-17 | End: 2025-07-19 | Stop reason: HOSPADM

## 2025-07-17 RX ORDER — CARBAMAZEPINE 100 MG/1
100 TABLET, EXTENDED RELEASE ORAL 2 TIMES DAILY
Status: DISCONTINUED | OUTPATIENT
Start: 2025-07-17 | End: 2025-07-19 | Stop reason: HOSPADM

## 2025-07-17 RX ORDER — ACETAMINOPHEN 160 MG/5ML
650 SOLUTION ORAL EVERY 4 HOURS PRN
Status: DISCONTINUED | OUTPATIENT
Start: 2025-07-17 | End: 2025-07-19 | Stop reason: HOSPADM

## 2025-07-17 RX ORDER — BISACODYL 10 MG
10 SUPPOSITORY, RECTAL RECTAL DAILY PRN
Status: DISCONTINUED | OUTPATIENT
Start: 2025-07-17 | End: 2025-07-19 | Stop reason: HOSPADM

## 2025-07-17 RX ORDER — AMOXICILLIN 250 MG
2 CAPSULE ORAL 2 TIMES DAILY PRN
Status: DISCONTINUED | OUTPATIENT
Start: 2025-07-17 | End: 2025-07-19 | Stop reason: HOSPADM

## 2025-07-17 RX ORDER — DORZOLAMIDE HCL 20 MG/ML
1 SOLUTION/ DROPS OPHTHALMIC 3 TIMES DAILY
Status: DISCONTINUED | OUTPATIENT
Start: 2025-07-17 | End: 2025-07-19 | Stop reason: HOSPADM

## 2025-07-17 RX ORDER — ACETAMINOPHEN 650 MG/1
650 SUPPOSITORY RECTAL EVERY 4 HOURS PRN
Status: DISCONTINUED | OUTPATIENT
Start: 2025-07-17 | End: 2025-07-19 | Stop reason: HOSPADM

## 2025-07-17 RX ORDER — TRAZODONE HYDROCHLORIDE 50 MG/1
50 TABLET ORAL NIGHTLY
COMMUNITY

## 2025-07-17 RX ORDER — TRAZODONE HYDROCHLORIDE 50 MG/1
50 TABLET ORAL NIGHTLY
Status: DISCONTINUED | OUTPATIENT
Start: 2025-07-17 | End: 2025-07-19 | Stop reason: HOSPADM

## 2025-07-17 RX ORDER — HYDRALAZINE HYDROCHLORIDE 25 MG/1
25 TABLET, FILM COATED ORAL EVERY 6 HOURS PRN
Status: DISCONTINUED | OUTPATIENT
Start: 2025-07-17 | End: 2025-07-19 | Stop reason: HOSPADM

## 2025-07-17 RX ORDER — BISACODYL 5 MG/1
5 TABLET, DELAYED RELEASE ORAL DAILY PRN
Status: DISCONTINUED | OUTPATIENT
Start: 2025-07-17 | End: 2025-07-19 | Stop reason: HOSPADM

## 2025-07-17 RX ORDER — POLYETHYLENE GLYCOL 3350 17 G/17G
17 POWDER, FOR SOLUTION ORAL DAILY PRN
Status: DISCONTINUED | OUTPATIENT
Start: 2025-07-17 | End: 2025-07-19 | Stop reason: HOSPADM

## 2025-07-17 RX ORDER — ONDANSETRON 4 MG/1
4 TABLET, ORALLY DISINTEGRATING ORAL EVERY 6 HOURS PRN
Status: DISCONTINUED | OUTPATIENT
Start: 2025-07-17 | End: 2025-07-19 | Stop reason: HOSPADM

## 2025-07-17 RX ORDER — BRIMONIDINE TARTRATE 2 MG/ML
1 SOLUTION/ DROPS OPHTHALMIC 2 TIMES DAILY
Status: DISCONTINUED | OUTPATIENT
Start: 2025-07-17 | End: 2025-07-19 | Stop reason: HOSPADM

## 2025-07-17 RX ORDER — NITROGLYCERIN 0.4 MG/1
0.4 TABLET SUBLINGUAL
Status: DISCONTINUED | OUTPATIENT
Start: 2025-07-17 | End: 2025-07-19 | Stop reason: HOSPADM

## 2025-07-17 RX ORDER — SODIUM CHLORIDE 0.9 % (FLUSH) 0.9 %
10 SYRINGE (ML) INJECTION EVERY 12 HOURS SCHEDULED
Status: DISCONTINUED | OUTPATIENT
Start: 2025-07-17 | End: 2025-07-19 | Stop reason: HOSPADM

## 2025-07-17 RX ORDER — ACETAMINOPHEN 325 MG/1
650 TABLET ORAL EVERY 4 HOURS PRN
Status: DISCONTINUED | OUTPATIENT
Start: 2025-07-17 | End: 2025-07-19 | Stop reason: HOSPADM

## 2025-07-17 RX ORDER — LEVOTHYROXINE SODIUM 75 UG/1
75 TABLET ORAL
Status: DISCONTINUED | OUTPATIENT
Start: 2025-07-18 | End: 2025-07-19 | Stop reason: HOSPADM

## 2025-07-17 RX ORDER — FLUTICASONE PROPIONATE 50 MCG
2 SPRAY, SUSPENSION (ML) NASAL DAILY
Status: DISCONTINUED | OUTPATIENT
Start: 2025-07-17 | End: 2025-07-19 | Stop reason: HOSPADM

## 2025-07-17 RX ORDER — CYCLOBENZAPRINE HCL 10 MG
5 TABLET ORAL 3 TIMES DAILY PRN
Status: DISCONTINUED | OUTPATIENT
Start: 2025-07-17 | End: 2025-07-19 | Stop reason: HOSPADM

## 2025-07-17 RX ADMIN — Medication 10 ML: at 20:44

## 2025-07-17 RX ADMIN — DORZOLAMIDE HYDROCHLORIDE 1 DROP: 20 SOLUTION/ DROPS OPHTHALMIC at 16:34

## 2025-07-17 RX ADMIN — BRIMONIDINE TARTRATE 1 DROP: 2 SOLUTION/ DROPS OPHTHALMIC at 20:46

## 2025-07-17 RX ADMIN — FLUTICASONE PROPIONATE 2 SPRAY: 50 SPRAY, METERED NASAL at 14:23

## 2025-07-17 RX ADMIN — ASPIRIN 81 MG: 81 TABLET, COATED ORAL at 20:44

## 2025-07-17 RX ADMIN — CARBAMAZEPINE 100 MG: 100 TABLET, EXTENDED RELEASE ORAL at 20:44

## 2025-07-17 RX ADMIN — BRIMONIDINE TARTRATE 1 DROP: 2 SOLUTION/ DROPS OPHTHALMIC at 14:23

## 2025-07-17 RX ADMIN — CARBAMAZEPINE 100 MG: 100 TABLET, EXTENDED RELEASE ORAL at 14:23

## 2025-07-17 RX ADMIN — DORZOLAMIDE HYDROCHLORIDE 1 DROP: 20 SOLUTION/ DROPS OPHTHALMIC at 20:46

## 2025-07-17 RX ADMIN — Medication 10 ML: at 14:01

## 2025-07-17 RX ADMIN — TRAZODONE HYDROCHLORIDE 50 MG: 50 TABLET ORAL at 20:44

## 2025-07-17 RX ADMIN — AMLODIPINE BESYLATE 5 MG: 5 TABLET ORAL at 13:40

## 2025-07-17 NOTE — PLAN OF CARE
Goal Outcome Evaluation:  Plan of Care Reviewed With: patient           Outcome Evaluation: admitted for fall and hypothermia, temperature now stable, episodes of bradycardia with pauses, cardio and EP consults, 2L, afib, assist x1, will continue to monitor

## 2025-07-17 NOTE — H&P
Patient Name:  Inge Borjas  YOB: 1932  MRN:  [id number]  Admit Date:  7/17/2025  Patient Care Team:  Angelica Anthony MD as PCP - General  Angelica Anthony MD as PCP - Family Medicine  Inge Jack MD as Referring Physician (General Surgery)  Len Escobar MD as Consulting Physician (Hematology and Oncology)      Subjective   History Present Illness     Chief Complaint   Patient presents with    Fall       Ms. Borjas is a 89 y.o. with a history of chronic afib, pulmonary hypertension, mitral and tricuspid regurg, recent admission for URI and recent death of her . She came to [facility] ER today after a mechanical fall. She was hypotensive with EMS (and hypertensive in the ER). She had hypothermia in the ER and was given warming blanket and bear hugger. Some bradycardia at times in the ER. She overall says she feels ok. Chronic dyspnea with exertion but denies any cough, chest pain, abdominal pain, dysuria. She says she's a little warm now after the bear huger.     History of Present Illness  Review of Systems   Constitutional:  Positive for fatigue. Negative for fever.   HENT: Negative.     Eyes: Negative.    Respiratory:  Positive for shortness of breath. Negative for cough.    Cardiovascular:  Negative for chest pain and palpitations.   Gastrointestinal: Negative.    Endocrine: Negative.    Genitourinary: Negative.    Musculoskeletal: Negative.    Skin: Negative.    Allergic/Immunologic: Negative.    Neurological: Negative.    Hematological: Negative.    Psychiatric/Behavioral: Negative.          Personal History     Past Medical History:   Diagnosis Date    Anemia     Arthritis     Atrial fibrillation     Intermittently    Colon cancer 2012    Stage III    Disease of thyroid gland     hypothyroid    Drug therapy 2012    pill for 6 months    H/O Herpes simplex     virus of the lip    Hyperlipidemia     Hypertension     Intervertebral disk disease     Polio     Trigeminal neuralgia       Past Surgical History:   Procedure Laterality Date    ARTHROSCOPY SHOULDER W/ OPEN ROTATOR CUFF REPAIR      BREAST BIOPSY Left     at least 25 years ago.    CARDIAC CATHETERIZATION  3/25/2025    Procedure: Right and Left Heart Cath;  Surgeon: Mukul Bloom MD;  Location:  [facility];  Service: Cardiology;;    CARDIAC CATHETERIZATION N/A 3/25/2025    Procedure: Coronary angiography;  Surgeon: Mukul Bloom MD;  Location:  [facility];  Service: Cardiology;  Laterality: N/A;    COLON RESECTION  08/2015    JOINT REPLACEMENT  1947    Elbow transplant and shoulder fusion    TONSILLECTOMY       Family History   Problem Relation Age of Onset    Colon cancer Father         Late in life    Heart disease Father     No Known Problems Mother      Social History     Tobacco Use    Smoking status: Never     Passive exposure: Never    Smokeless tobacco: Never   Vaping Use    Vaping status: Never Used   Substance Use Topics    Alcohol use: No    Drug use: No     Medications Prior to Admission   Medication Sig Dispense Refill Last Dose/Taking    atenolol (TENORMIN) 100 MG tablet Take 1 tablet (100 mg total) by mouth 2 (Two) Times a Day 240 tablet 1 7/16/2025 Evening    carBAMazepine XR (TEGretol  XR) 100 MG 12 hr tablet Take 1 tablet by mouth 2 (Two) Times a Day. 180 tablet 4 7/16/2025 Evening    doxazosin (CARDURA) 4 MG tablet Take 1 tablet by mouth every night at bedtime. 90 tablet 4 7/16/2025 Bedtime    furosemide (LASIX) 20 MG tablet Take 1 tablet by mouth Daily.   7/16/2025 Morning    levothyroxine (SYNTHROID, LEVOTHROID) 75 MCG tablet Take 1 tablet by mouth Every Morning.   7/16/2025 Morning    spironolactone (ALDACTONE) 25 MG tablet Take 1 tablet by mouth Daily.   7/16/2025 Evening    traZODone (DESYREL) 50 MG tablet Take 1 tablet by mouth Every Night.   7/16/2025 Evening    acetaminophen (TYLENOL) 325 MG tablet Take 2 tablets by mouth Every 6 (Six) Hours As Needed for  Mild Pain, Moderate Pain, Headache or Fever.       amLODIPine (NORVASC) 5 MG tablet Take 1 tablet by mouth Daily.       aspirin 81 MG EC tablet Take 1 tablet by mouth Daily.       brimonidine (ALPHAGAN) 0.2 % ophthalmic solution Administer 1 drop to both eyes 2 (Two) Times a Day.       calcium carbonate-vitamin d 600-400 MG-UNIT per tablet Take 1 tablet by mouth Daily.       cyclobenzaprine (FLEXERIL) 5 MG tablet Take 1 tablet by mouth 3 (Three) Times a Day As Needed for Muscle Spasms.       dorzolamide (TRUSOPT) 2 % ophthalmic solution 1 drop 3 (Three) Times a Day.       fluticasone (FLONASE) 50 MCG/ACT nasal spray Administer 2 sprays into the nostril(s) as directed by provider Daily.       Multiple Vitamins-Minerals (CENTRUM ADULTS PO) Take  by mouth.        Allergies:    Allergies   Allergen Reactions    Cardizem [Diltiazem Hcl] Unknown - Low Severity    Amoxicillin Rash       Objective    Objective     Vital Signs  Temp:  [93.6 °F (34.2 °C)-94.2 °F (34.6 °C)] 94.2 °F (34.6 °C)  Heart Rate:  [53-72] 65  Resp:  [22] 22  BP: (130-177)/() 177/99  SpO2:  [91 %-96 %] 96 %  on   ;   Device (Oxygen Therapy): room air  Body mass index is 20.12 kg/m².    Physical Exam  Vitals and nursing note reviewed.   Constitutional:       General: She is in acute distress.      Appearance: She is well-developed. She is ill-appearing (elderly, chronically ill).   HENT:      Head: Normocephalic and atraumatic.   Eyes:      General: No scleral icterus.     Pupils: Pupils are equal, round, and reactive to light.   Cardiovascular:      Rate and Rhythm: Normal rate. Rhythm irregular.      Heart sounds: Murmur heard.   Pulmonary:      Effort: Pulmonary effort is normal.      Breath sounds: Normal breath sounds.   Abdominal:      General: Bowel sounds are normal. There is no distension.      Palpations: Abdomen is soft.      Tenderness: There is no abdominal tenderness.   Musculoskeletal:      Cervical back: Normal range of motion and  neck supple.   Skin:     General: Skin is warm and dry.      Findings: No rash.   Neurological:      Mental Status: She is alert.      Cranial Nerves: No cranial nerve deficit.   Psychiatric:         Behavior: Behavior normal.         Thought Content: Thought content normal.         Results Review:  I reviewed the patient's new clinical results.  Discussed with ED provider.    Lab Results (last 24 hours)       Procedure Component Value Units Date/Time    Urinalysis With Culture If Indicated - Straight Cath [[id number]]  (Abnormal) Collected: 07/17/25 0633    Specimen: Urine from Straight Cath Updated: 07/17/25 0757     Color, UA Yellow     Appearance, UA Clear     pH, UA 8.0     Specific Gravity, UA 1.016     Glucose, UA Negative     Ketones, UA Negative     Bilirubin, UA Negative     Blood, UA Trace     Protein, UA 30 mg/dL (1+)     Leuk Esterase, UA Trace     Nitrite, UA Negative     Urobilinogen, UA 1.0 E.U./dL    Narrative:      In absence of clinical symptoms, the presence of pyuria, bacteria, and/or nitrites on the urinalysis result does not correlate with infection.    Urinalysis, Microscopic Only - Straight Cath [[id number]]  (Abnormal) Collected: 07/17/25 0633    Specimen: Urine from Straight Cath Updated: 07/17/25 0757     RBC, UA 11-20 /HPF      WBC, UA 0-2 /HPF      Comment: Urine culture not indicated.        Bacteria, UA None Seen /HPF      Squamous Epithelial Cells, UA 0-2 /HPF      Hyaline Casts, UA 0-2 /LPF      Methodology Automated Microscopy    CBC & Differential [[id number]]  (Abnormal) Collected: 07/17/25 0640    Specimen: Blood Updated: 07/17/25 0708    Narrative:      The following orders were created for panel order CBC & Differential.  Procedure                               Abnormality         Status                     ---------                               -----------         ------                     CBC Auto Differential[[id number]]        Abnormal            Final result                  Please view results for these tests on the individual orders.    Comprehensive Metabolic Panel [[id number]]  (Abnormal) Collected: 07/17/25 0640    Specimen: Blood Updated: 07/17/25 0724     Glucose 110 mg/dL      BUN 40.0 mg/dL      Creatinine 1.44 mg/dL      Sodium 134 mmol/L      Potassium 5.0 mmol/L      Comment: Slight hemolysis detected by analyzer. Result may be falsely elevated.        Chloride 99 mmol/L      CO2 26.0 mmol/L      Calcium 9.3 mg/dL      Total Protein 6.7 g/dL      Albumin 4.1 g/dL      ALT (SGPT) 24 U/L      AST (SGOT) 32 U/L      Alkaline Phosphatase 97 U/L      Total Bilirubin 0.6 mg/dL      Globulin 2.6 gm/dL      A/G Ratio 1.6 g/dL      BUN/Creatinine Ratio 27.8     Anion Gap 9.0 mmol/L      eGFR 34.2 mL/min/1.73     Narrative:      GFR Categories in Chronic Kidney Disease (CKD)              GFR Category          GFR (mL/min/1.73)    Interpretation  G1                    90 or greater        Normal or high (1)  G2                    60-89                Mild decrease (1)  G3a                   45-59                Mild to moderate decrease  G3b                   30-44                Moderate to severe decrease  G4                    15-29                Severe decrease  G5                    14 or less           Kidney failure    (1)In the absence of evidence of kidney disease, neither GFR category G1 or G2 fulfill the criteria for CKD.    eGFR calculation 2021 CKD-EPI creatinine equation, which does not include race as a factor    Lactic Acid, Plasma [[id number]]  (Normal) Collected: 07/17/25 0640    Specimen: Blood Updated: 07/17/25 0720     Lactate 1.1 mmol/L     CBC Auto Differential [[id number]]  (Abnormal) Collected: 07/17/25 0640    Specimen: Blood Updated: 07/17/25 0708     WBC 5.58 10*3/mm3      RBC 3.52 10*6/mm3      Hemoglobin 11.1 g/dL      Hematocrit 35.1 %      MCV 99.7 fL      MCH 31.5 pg      MCHC 31.6 g/dL      RDW 14.5 %      RDW-SD 52.7 fl      MPV 10.8 fL       Platelets 107 10*3/mm3      Neutrophil % 78.0 %      Lymphocyte % 12.5 %      Monocyte % 8.4 %      Eosinophil % 0.4 %      Basophil % 0.2 %      Immature Grans % 0.5 %      Neutrophils, Absolute 4.35 10*3/mm3      Lymphocytes, Absolute 0.70 10*3/mm3      Monocytes, Absolute 0.47 10*3/mm3      Eosinophils, Absolute 0.02 10*3/mm3      Basophils, Absolute 0.01 10*3/mm3      Immature Grans, Absolute 0.03 10*3/mm3     Narrative:      Instrument flags present, additional testing may be recommended.    Blood Culture - Blood, Arm, Left [[id number]] Collected: 07/17/25 0654    Specimen: Blood from Arm, Left Updated: 07/17/25 0706    Blood Culture - Blood, Arm, Left [[id number]] Collected: 07/17/25 0654    Specimen: Blood from Arm, Left Updated: 07/17/25 0706            Imaging Results (Last 24 Hours)       Procedure Component Value Units Date/Time    XR Chest 1 View [[id number]] Collected: 07/17/25 0759     Updated: 07/17/25 0807    Narrative:      XR CHEST 1 VW-, XR SPINE LUMBAR 2 OR 3 VW-, XR HIP W OR WO PELVIS 2-3  VIEW RIGHT-     HISTORY: Female who is 89 years-old, trauma, sepsis     TECHNIQUE: Frontal view of the chest, frontal view of the pelvis, 2  views of the right hip, 3 views of the lumbar spine     COMPARISON: 6/8/2025     FINDINGS:     Chest x-ray: The heart is enlarged. Aorta is tortuous, calcified.  Pulmonary vasculature is unremarkable. No focal pulmonary consolidation,  pleural effusion, or pneumothorax. Old granulomatous disease is  apparent. Chronic right rib and shoulder deformities. Chronic narrowing  of the left acromio humeral interval, compatible with rotator cuff  pathology. No acute osseous process.     Lumbar spine: As position, lumbar dextroscoliosis is apparent. Mild  anterior listhesis of L4 on L5, L5 on S1. Retrolisthesis of L1 on L2, L2  on L3. Vertebral body heights appear preserved. No acute fracture is  identified. Multilevel endplate spurring, disc base narrowing, and facet  arthropathy.  Arterial calcifications are conspicuous.     Pelvis and right hip: No acute fracture, erosion, or dislocation is  identified. Mild bilateral hip joint space narrowing. Mild colonic fecal  retention is apparent. Arterial calcifications are seen.     Follow-up/further evaluation can be obtained as indications persist.       Impression:      As described.     This report was finalized on 7/17/2025 8:04 AM by Dr. Esa Corea M.D on Workstation: [id number]       XR Hip With or Without Pelvis 2 - 3 View Right [[id number]] Collected: 07/17/25 0759     Updated: 07/17/25 0807    Narrative:      XR CHEST 1 VW-, XR SPINE LUMBAR 2 OR 3 VW-, XR HIP W OR WO PELVIS 2-3  VIEW RIGHT-     HISTORY: Female who is 89 years-old, trauma, sepsis     TECHNIQUE: Frontal view of the chest, frontal view of the pelvis, 2  views of the right hip, 3 views of the lumbar spine     COMPARISON: 6/8/2025     FINDINGS:     Chest x-ray: The heart is enlarged. Aorta is tortuous, calcified.  Pulmonary vasculature is unremarkable. No focal pulmonary consolidation,  pleural effusion, or pneumothorax. Old granulomatous disease is  apparent. Chronic right rib and shoulder deformities. Chronic narrowing  of the left acromio humeral interval, compatible with rotator cuff  pathology. No acute osseous process.     Lumbar spine: As position, lumbar dextroscoliosis is apparent. Mild  anterior listhesis of L4 on L5, L5 on S1. Retrolisthesis of L1 on L2, L2  on L3. Vertebral body heights appear preserved. No acute fracture is  identified. Multilevel endplate spurring, disc base narrowing, and facet  arthropathy. Arterial calcifications are conspicuous.     Pelvis and right hip: No acute fracture, erosion, or dislocation is  identified. Mild bilateral hip joint space narrowing. Mild colonic fecal  retention is apparent. Arterial calcifications are seen.     Follow-up/further evaluation can be obtained as indications persist.       Impression:      As described.      This report was finalized on 7/17/2025 8:04 AM by Dr. Esa Corea M.D on Workstation: [id number]       XR Spine Lumbar 2 or 3 View [[id number]] Collected: 07/17/25 0759     Updated: 07/17/25 0807    Narrative:      XR CHEST 1 VW-, XR SPINE LUMBAR 2 OR 3 VW-, XR HIP W OR WO PELVIS 2-3  VIEW RIGHT-     HISTORY: Female who is 89 years-old, trauma, sepsis     TECHNIQUE: Frontal view of the chest, frontal view of the pelvis, 2  views of the right hip, 3 views of the lumbar spine     COMPARISON: 6/8/2025     FINDINGS:     Chest x-ray: The heart is enlarged. Aorta is tortuous, calcified.  Pulmonary vasculature is unremarkable. No focal pulmonary consolidation,  pleural effusion, or pneumothorax. Old granulomatous disease is  apparent. Chronic right rib and shoulder deformities. Chronic narrowing  of the left acromio humeral interval, compatible with rotator cuff  pathology. No acute osseous process.     Lumbar spine: As position, lumbar dextroscoliosis is apparent. Mild  anterior listhesis of L4 on L5, L5 on S1. Retrolisthesis of L1 on L2, L2  on L3. Vertebral body heights appear preserved. No acute fracture is  identified. Multilevel endplate spurring, disc base narrowing, and facet  arthropathy. Arterial calcifications are conspicuous.     Pelvis and right hip: No acute fracture, erosion, or dislocation is  identified. Mild bilateral hip joint space narrowing. Mild colonic fecal  retention is apparent. Arterial calcifications are seen.     Follow-up/further evaluation can be obtained as indications persist.       Impression:      As described.     This report was finalized on 7/17/2025 8:04 AM by Dr. Esa Corea M.D on Workstation: [id number]               Results for orders placed during the hospital encounter of 03/12/25    Adult Transthoracic Echo Complete w/ Color, Spectral and Contrast if Necessary Per Protocol 03/12/2025  2:57 PM    Interpretation Summary    Left ventricular systolic function is normal.  Left ventricular ejection fraction appears to be 61 - 65%.    Left ventricular diastolic function was indeterminate.    Moderately reduced right ventricular systolic function noted.    The right ventricular cavity is moderately dilated.    The left atrial cavity is moderately dilated.    The right atrial cavity is moderately  dilated.    There is mild calcification of the aortic valve.    There is bileaflet mitral valve thickening present.    Severe mitral valve regurgitation is present.    Severe tricuspid valve regurgitation is present.    Estimated right ventricular systolic pressure from tricuspid regurgitation is markedly elevated (>55 mmHg).      ECG 12 Lead Bradycardia   Final Result   HEART RATE=54  bpm   RR Vcerppvw=1120  ms   WV Interval=  ms   P Horizontal Axis=  deg   P Front Axis=  deg   QRSD Interval=85  ms   QT Baepdbgh=544  ms   FDbR=025  ms   QRS Axis=53  deg   T Wave Axis=14  deg   - ABNORMAL ECG -   Atrial fibrillation   Abnormal R-wave progression, early transition   Consider  left ventricular hypertrophy   When compared with ECG of 08-Jun-2025 17:12:52,   Significant rate decrease otherwise no change   Electronically Signed By: Yousuf Fiore ([facility]) 2025-07-17 09:56:42   Date and Time of Study:2025-07-17 08:25:55      Telemetry Scan   Final Result           Assessment/Plan     Active Hospital Problems    Diagnosis  POA    **Hypothermia [T68.XXXA]  Yes    Chronic diastolic CHF (congestive heart failure) [I50.32]  Yes    Chronic a-fib [I48.20]  Yes    Moderate protein-calorie malnutrition [E44.0]  Yes    Essential hypertension [I10]  Yes    Hypothyroidism (acquired) [E03.9]  Yes    Nonrheumatic tricuspid valve regurgitation [I36.1]  Yes       Ms. Borjas is a 89 y.o. with a history of chronic afib, pulmonary hypertension, mitral and tricuspid regurg, recent admission for URI and recent death of her . She came to [facility] ER today after a mechanical fall. She was hypotensive with EMS (and  hypertensive in the ER). She had hypothermia in the ER and was given warming blanket and bear hugger. Some bradycardia at times in the ER. She overall says she feels ok. Chronic dyspnea with exertion but denies any cough, chest pain, abdominal pain, dysuria. She says she's a little warm now after the bear huger.     Continue warming measures to bring up temp. No evidence of infection by workup or symptoms. Check TSH and Cortisol  Cardiology consulted by ER for afib with low HR. May be related to to her hypothermia, hold home BB  Restart most of home meds, hold diuretics for now  I discussed the patients findings and my recommendations with patient and nursing staff.    VTE Prophylaxis - SCDs.  Code Status - DNR/DNI- confirmed with patient at bedside. She would be ok with most medical treatment but no intubation, no CPR.       Liam Bynum MD  [facility]  07/17/25  11:54 EDT

## 2025-07-17 NOTE — ED NOTES
Nursing report ED to floor  Inge Borjas  89 y.o.  female    HPI :  HPI  Stated Reason for Visit: fall, hypotensive w/ EMS  History Obtained From: patient, EMS    Chief Complaint  Chief Complaint   Patient presents with    Fall       Admitting doctor:   Liam Bynum MD    Admitting diagnosis:   The primary encounter diagnosis was Atrial fibrillation, unspecified type. A diagnosis of Hypothermia, initial encounter was also pertinent to this visit.    Code status:   Current Code Status       Date Active Code Status Order ID Comments User Context       Prior            Allergies:   Cardizem [diltiazem hcl] and Amoxicillin    Isolation:   No active isolations    Intake and Output  No intake or output data in the 24 hours ending 07/17/25 1014    Weight:       07/17/25  0618   Weight: 46.7 kg (103 lb)       Most recent vitals:   Vitals:    07/17/25 0700 07/17/25 0703 07/17/25 0900 07/17/25 0917   BP: 130/92  177/99    BP Location:       Patient Position:       Pulse: 72  65    Resp:       Temp:    94.2 °F (34.6 °C)   TempSrc:    Rectal   SpO2:  92% 96%    Weight:           Active LDAs/IV Access:   Lines, Drains & Airways       Active LDAs       Name Placement date Placement time Site Days    Peripheral IV 07/17/25 0625 20 G Anterior;Left;Proximal Forearm 07/17/25  0625  Forearm  less than 1    External Urinary Catheter 07/17/25  0853  --  less than 1                    Labs (abnormal labs have a star):   Labs Reviewed   COMPREHENSIVE METABOLIC PANEL - Abnormal; Notable for the following components:       Result Value    Glucose 110 (*)     BUN 40.0 (*)     Creatinine 1.44 (*)     Sodium 134 (*)     BUN/Creatinine Ratio 27.8 (*)     eGFR 34.2 (*)     All other components within normal limits    Narrative:     GFR Categories in Chronic Kidney Disease (CKD)              GFR Category          GFR (mL/min/1.73)    Interpretation  G1                    90 or greater        Normal or high (1)  G2                    60-89                 Mild decrease (1)  G3a                   45-59                Mild to moderate decrease  G3b                   30-44                Moderate to severe decrease  G4                    15-29                Severe decrease  G5                    14 or less           Kidney failure    (1)In the absence of evidence of kidney disease, neither GFR category G1 or G2 fulfill the criteria for CKD.    eGFR calculation 2021 CKD-EPI creatinine equation, which does not include race as a factor   URINALYSIS W/ CULTURE IF INDICATED - Abnormal; Notable for the following components:    Blood, UA Trace (*)     Protein, UA 30 mg/dL (1+) (*)     Leuk Esterase, UA Trace (*)     All other components within normal limits    Narrative:     In absence of clinical symptoms, the presence of pyuria, bacteria, and/or nitrites on the urinalysis result does not correlate with infection.   CBC WITH AUTO DIFFERENTIAL - Abnormal; Notable for the following components:    RBC 3.52 (*)     Hemoglobin 11.1 (*)     MCV 99.7 (*)     Platelets 107 (*)     Neutrophil % 78.0 (*)     Lymphocyte % 12.5 (*)     All other components within normal limits    Narrative:     Instrument flags present, additional testing may be recommended.   URINALYSIS, MICROSCOPIC ONLY - Abnormal; Notable for the following components:    RBC, UA 11-20 (*)     All other components within normal limits   LACTIC ACID, PLASMA - Normal   BLOOD CULTURE   BLOOD CULTURE   RAINBOW DRAW    Narrative:     The following orders were created for panel order [facility] Draw.  Procedure                               Abnormality         Status                     ---------                               -----------         ------                     Green Top (Gel)[[id number]]                                  Final result               Lavender Top[[id number]]                                     Final result               Gold Top - [facility][[id number]]                                   Final  result               Light Blue Top[[id number]]                                   Final result                 Please view results for these tests on the individual orders.   CBC AND DIFFERENTIAL    Narrative:     The following orders were created for panel order CBC & Differential.  Procedure                               Abnormality         Status                     ---------                               -----------         ------                     CBC Auto Differential[[id number]]        Abnormal            Final result                 Please view results for these tests on the individual orders.   GREEN TOP   LAVENDER TOP   GOLD TOP - SST   LIGHT BLUE TOP       EKG:   ECG 12 Lead Bradycardia   Final Result   HEART RATE=54  bpm   RR Yxgrvwrr=6677  ms   AZ Interval=  ms   P Horizontal Axis=  deg   P Front Axis=  deg   QRSD Interval=85  ms   QT Btkrywtl=072  ms   WCbK=161  ms   QRS Axis=53  deg   T Wave Axis=14  deg   - ABNORMAL ECG -   Atrial fibrillation   Abnormal R-wave progression, early transition   Consider  left ventricular hypertrophy   When compared with ECG of 08-Jun-2025 17:12:52,   Significant rate decrease otherwise no change   Electronically Signed By: Yousuf Fiore ([facility]) 2025-07-17 09:56:42   Date and Time of Study:2025-07-17 08:25:55      Telemetry Scan   Final Result          Meds given in ED:   Medications   sodium chloride 0.9 % flush 10 mL (has no administration in time range)       Imaging results:  XR Chest 1 View  Result Date: 7/17/2025  As described.  This report was finalized on 7/17/2025 8:04 AM by Dr. Esa Corea M.D on Workstation: [id number]      XR Hip With or Without Pelvis 2 - 3 View Right  Result Date: 7/17/2025  As described.  This report was finalized on 7/17/2025 8:04 AM by Dr. Esa Corea M.D on Workstation: [id number]      XR Spine Lumbar 2 or 3 View  Result Date: 7/17/2025  As described.  This report was finalized on 7/17/2025 8:04 AM by Dr. Esa Corea M.D  on Workstation: [id number]        Ambulatory status:   - bed rest    Social issues:   Social History     Socioeconomic History    Marital status:      Spouse name: Burt   Tobacco Use    Smoking status: Never     Passive exposure: Never    Smokeless tobacco: Never   Vaping Use    Vaping status: Never Used   Substance and Sexual Activity    Alcohol use: No    Drug use: No    Sexual activity: Defer     Partners: Male       Peripheral Neurovascular  Peripheral Neurovascular (Adult)  Peripheral Neurovascular WDL: WDL    Neuro Cognitive  Neuro Cognitive (Adult)  Cognitive/Neuro/Behavioral WDL: WDL, orientation  Orientation: oriented x 4    Learning  Learning Assessment  Learning Readiness and Ability: no barriers identified  Education Provided  Person Taught: patient  Teaching Method: verbal instruction  Teaching Focus: symptom/problem overview  Education Outcome Evaluation: verbalizes understanding    Respiratory  Respiratory WDL  Respiratory WDL: WDL, rhythm/pattern  Rhythm/Pattern, Respiratory: no shortness of breath reported, depth regular, pattern regular, unlabored    Abdominal Pain       Pain Assessments  Pain (Adult)  (0-10) Pain Rating: Rest: 0  (0-10) Pain Rating: Activity: 0    NIH Stroke Scale       Katarzyna Lopez RN  07/17/25 10:14 EDT

## 2025-07-17 NOTE — ED PROVIDER NOTES
EMERGENCY DEPARTMENT ENCOUNTER  Room Number:  05/05  PCP: Angelica Anthony MD  Independent Historians: Patient      HPI:  Chief Complaint: had concerns including Fall.       Context: Inge Borjas is a pleasant afebrile 89 y.o.  female with a medical history of htn, polio, afib  not on oral anticoagulation who presents to the ED c/o acute fall    Patient brought to the emergency department via EMS from [facility] assisted living  Patient states that she has a history of polio and previously resided at home with her  until he recently passed away  She reports having a mechanical fall, neighbor heard her calling out for help around 3 AM today  Patient with a notable ecchymosis over her right buttock she currently denies any discomfort like headache, chest pain abdominal pain  EMS reported that her blood pressure was running on the low side with them, 70s over 40s and they started an IV and some normal saline  Here patient's BP is 155/102 after multiple rechecks but her rectal temp is low.      Review of prior external notes (non-ED) -and- Review of prior external test results outside of this encounter: 6/8/2025 admitted for frequent falls, shortness of breath, found to have a urinary tract infection and viral URI, DC'd home with Harry and David    Prescription drug monitoring program review:         PAST MEDICAL HISTORY  Active Ambulatory Problems     Diagnosis Date Noted    Trigeminal neuralgia 07/19/2016    Malignant neoplasm of sigmoid colon 07/19/2016    COLVIN (dyspnea on exertion) 03/03/2025    Unstable angina 03/03/2025    Dyspnea on exertion 03/26/2025    Nonrheumatic tricuspid valve regurgitation 05/13/2025    Mitral regurgitation 05/13/2025    Shortness of breath 06/08/2025    Rhinovirus infection 06/09/2025    Weakness 06/09/2025    Falls 06/09/2025    A-fib 06/09/2025    Essential hypertension 06/09/2025    Acute on chronic diastolic CHF (congestive heart failure) 06/09/2025    Hypothyroidism  (acquired) 06/09/2025    Hyperlipidemia 06/09/2025    Acute kidney failure 06/09/2025    Chronic kidney failure, stage 3 (moderate) 06/09/2025    Anemia 06/09/2025    Moderate protein-calorie malnutrition 06/10/2025    UTI (urinary tract infection) 06/12/2025     Resolved Ambulatory Problems     Diagnosis Date Noted    No Resolved Ambulatory Problems     Past Medical History:   Diagnosis Date    Arthritis     Atrial fibrillation     Colon cancer 2012    Disease of thyroid gland     Drug therapy 2012    H/O Herpes simplex     Hypertension     Intervertebral disk disease     Polio          PAST SURGICAL HISTORY  Past Surgical History:   Procedure Laterality Date    ARTHROSCOPY SHOULDER W/ OPEN ROTATOR CUFF REPAIR      BREAST BIOPSY Left     at least 25 years ago.    CARDIAC CATHETERIZATION  3/25/2025    Procedure: Right and Left Heart Cath;  Surgeon: Mukul Bloom MD;  Location:  [facility];  Service: Cardiology;;    CARDIAC CATHETERIZATION N/A 3/25/2025    Procedure: Coronary angiography;  Surgeon: Mukul Bloom MD;  Location:  [facility];  Service: Cardiology;  Laterality: N/A;    COLON RESECTION  08/2015    JOINT REPLACEMENT  1947    Elbow transplant and shoulder fusion    TONSILLECTOMY           FAMILY HISTORY  Family History   Problem Relation Age of Onset    Colon cancer Father         Late in life    Heart disease Father     No Known Problems Mother          SOCIAL HISTORY  Social History     Socioeconomic History    Marital status:      Spouse name: Burt   Tobacco Use    Smoking status: Never     Passive exposure: Never    Smokeless tobacco: Never   Vaping Use    Vaping status: Never Used   Substance and Sexual Activity    Alcohol use: No    Drug use: No    Sexual activity: Defer     Partners: Male       Chronic or social conditions impacting patient care (Social Determinants of Health):  Social Drivers of Health     Tobacco Use: Low Risk   (6/9/2025)    Patient History     Smoking Tobacco Use: Never     Smokeless Tobacco Use: Never     Passive Exposure: Never   Alcohol Use: Not At Risk (6/9/2025)    AUDIT-C     Frequency of Alcohol Consumption: Never     Average Number of Drinks: Patient does not drink     Frequency of Binge Drinking: Never   Financial Resource Strain: Low Risk  (6/9/2025)    Overall Financial Resource Strain (CARDIA)     Difficulty of Paying Living Expenses: Not hard at all   Food Insecurity: No Food Insecurity (6/9/2025)    Hunger Vital Sign     Worried About Running Out of Food in the Last Year: Never true     Ran Out of Food in the Last Year: Never true   Transportation Needs: No Transportation Needs (6/9/2025)    PRAPARE - Transportation     Lack of Transportation (Medical): No     Lack of Transportation (Non-Medical): No   Physical Activity: Insufficiently Active (6/9/2025)    Exercise Vital Sign     Days of Exercise per Week: 4 days     Minutes of Exercise per Session: 30 min   Stress: No Stress Concern Present (6/9/2025)    [employer] [place] [employer] - Occupational Stress Questionnaire     Feeling of Stress : Not at all   Social Connections: Not At Risk (6/9/2025)    Family and Community Support     Help with Day-to-Day Activities: I don't need any help     Lonely or Isolated: Never   Interpersonal Safety: Not At Risk (7/17/2025)    Abuse Screen     Unsafe at Home or Work/School: no     Feels Threatened by Someone?: no     Does Anyone Keep You from Contacting Others or Doint Things Outside the Home?: no     Physical Sign of Abuse Present: no   Depression: Not at risk (6/9/2025)    PHQ-2     PHQ-2 Score: 0   Housing Stability: Not At Risk (6/9/2025)    Housing Stability     Current Living Arrangements: home     Potentially Unsafe Housing Conditions: none   Utilities: Not At Risk (6/9/2025)    C Utilities     Threatened with loss of utilities: No   Health Literacy: Not At Risk (6/9/2025)    Education     Help  with school or training?: No     Preferred Language: English   Employment: Not At Risk (6/9/2025)    Employment     Do you want help finding or keeping work or a job?: I do not need or want help   Disabilities: Not At Risk (6/9/2025)    Disabilities     Concentrating, Remembering, or Making Decisions Difficulty: no     Doing Errands Independently Difficulty: no       ALLERGIES  Cardizem [diltiazem hcl] and Amoxicillin      REVIEW OF SYSTEMS  Review of Systems  Included in HPI  All systems reviewed and negative except for those discussed in HPI.      PHYSICAL EXAM    I have reviewed the triage vital signs and nursing notes.    ED Triage Vitals   Temp Heart Rate Resp BP SpO2   -- 07/17/25 0618 07/17/25 0617 07/17/25 0617 07/17/25 0618    53 22 (!) 155/102 91 %      Temp src Heart Rate Source Patient Position BP Location FiO2 (%)   -- 07/17/25 0618 07/17/25 0617 07/17/25 0617 --    Monitor Sitting Left arm        Physical Exam  GENERAL: alert, no acute distress, elderly  SKIN: Warm, dry, and intact  HENT: Normocephalic, atraumatic  EYES: no scleral icterus or injection, EOMI  CV: regular without tachycardia however at times intermittently bradycardic, regular rate  RESPIRATORY: normal effort, lungs clear, no adventitious breath sounds appreciated  ABDOMEN: soft, nontender, nondistended  MUSCULOSKELETAL: no deformity active range of motion of all 4 extremities, there is a large 8 cm x 7 cm approximate area of confluent ecchymoses overlying patient's right buttock/posterior hip region  NEURO: alert, moves all extremities, follows commands, poor insight            LAB RESULTS  Recent Results (from the past 24 hours)   Urinalysis With Culture If Indicated - Straight Cath    Collection Time: 07/17/25  6:33 AM    Specimen: Straight Cath; Urine   Result Value Ref Range    Color, UA Yellow Yellow, Straw    Appearance, UA Clear Clear    pH, UA 8.0 5.0 - 8.0    Specific Gravity, UA 1.016 1.005 - 1.030    Glucose, UA Negative  Negative    Ketones, UA Negative Negative    Bilirubin, UA Negative Negative    Blood, UA Trace (A) Negative    Protein, UA 30 mg/dL (1+) (A) Negative    Leuk Esterase, UA Trace (A) Negative    Nitrite, UA Negative Negative    Urobilinogen, UA 1.0 E.U./dL 0.2 - 1.0 E.U./dL   Urinalysis, Microscopic Only - Straight Cath    Collection Time: 07/17/25  6:33 AM    Specimen: Straight Cath; Urine   Result Value Ref Range    RBC, UA 11-20 (A) None Seen, 0-2 /HPF    WBC, UA 0-2 None Seen, 0-2 /HPF    Bacteria, UA None Seen None Seen /HPF    Squamous Epithelial Cells, UA 0-2 None Seen, 0-2 /HPF    Hyaline Casts, UA 0-2 None Seen /LPF    Methodology Automated Microscopy    Comprehensive Metabolic Panel    Collection Time: 07/17/25  6:40 AM    Specimen: Blood   Result Value Ref Range    Glucose 110 (H) 65 - 99 mg/dL    BUN 40.0 (H) 8.0 - 23.0 mg/dL    Creatinine 1.44 (H) 0.57 - 1.00 mg/dL    Sodium 134 (L) 136 - 145 mmol/L    Potassium 5.0 3.5 - 5.2 mmol/L    Chloride 99 98 - 107 mmol/L    CO2 26.0 22.0 - 29.0 mmol/L    Calcium 9.3 8.2 - 9.6 mg/dL    Total Protein 6.7 6.0 - 8.5 g/dL    Albumin 4.1 3.5 - 5.2 g/dL    ALT (SGPT) 24 1 - 33 U/L    AST (SGOT) 32 1 - 32 U/L    Alkaline Phosphatase 97 39 - 117 U/L    Total Bilirubin 0.6 0.0 - 1.2 mg/dL    Globulin 2.6 gm/dL    A/G Ratio 1.6 g/dL    BUN/Creatinine Ratio 27.8 (H) 7.0 - 25.0    Anion Gap 9.0 5.0 - 15.0 mmol/L    eGFR 34.2 (L) >60.0 mL/min/1.73   Lactic Acid, Plasma    Collection Time: 07/17/25  6:40 AM    Specimen: Blood   Result Value Ref Range    Lactate 1.1 0.5 - 2.0 mmol/L   Green Top (Gel)    Collection Time: 07/17/25  6:40 AM   Result Value Ref Range    Extra Tube Hold for add-ons.    Lavender Top    Collection Time: 07/17/25  6:40 AM   Result Value Ref Range    Extra Tube hold for add-on    Gold Top - SST    Collection Time: 07/17/25  6:40 AM   Result Value Ref Range    Extra Tube Hold for add-ons.    Light Blue Top    Collection Time: 07/17/25  6:40 AM   Result  Value Ref Range    Extra Tube Hold for add-ons.    CBC Auto Differential    Collection Time: 07/17/25  6:40 AM    Specimen: Blood   Result Value Ref Range    WBC 5.58 3.40 - 10.80 10*3/mm3    RBC 3.52 (L) 3.77 - 5.28 10*6/mm3    Hemoglobin 11.1 (L) 12.0 - 15.9 g/dL    Hematocrit 35.1 34.0 - 46.6 %    MCV 99.7 (H) 79.0 - 97.0 fL    MCH 31.5 26.6 - 33.0 pg    MCHC 31.6 31.5 - 35.7 g/dL    RDW 14.5 12.3 - 15.4 %    RDW-SD 52.7 37.0 - 54.0 fl    MPV 10.8 6.0 - 12.0 fL    Platelets 107 (L) 140 - 450 10*3/mm3    Neutrophil % 78.0 (H) 42.7 - 76.0 %    Lymphocyte % 12.5 (L) 19.6 - 45.3 %    Monocyte % 8.4 5.0 - 12.0 %    Eosinophil % 0.4 0.3 - 6.2 %    Basophil % 0.2 0.0 - 1.5 %    Immature Grans % 0.5 0.0 - 0.5 %    Neutrophils, Absolute 4.35 1.70 - 7.00 10*3/mm3    Lymphocytes, Absolute 0.70 0.70 - 3.10 10*3/mm3    Monocytes, Absolute 0.47 0.10 - 0.90 10*3/mm3    Eosinophils, Absolute 0.02 0.00 - 0.40 10*3/mm3    Basophils, Absolute 0.01 0.00 - 0.20 10*3/mm3    Immature Grans, Absolute 0.03 0.00 - 0.05 10*3/mm3   ECG 12 Lead Bradycardia    Collection Time: 07/17/25  8:25 AM   Result Value Ref Range    QT Interval 428 ms    QTC Interval 406 ms         RADIOLOGY  XR Chest 1 View  XR Chest 1 View, XR Hip With or Without Pelvis 2 - 3 View Right  XR Chest 1 View, XR Hip With or Without Pelvis 2 - 3 View Right, XR Spine Lumbar 2 or 3 View  Result Date: 7/17/2025  XR CHEST 1 VW-, XR SPINE LUMBAR 2 OR 3 VW-, XR HIP W OR WO PELVIS 2-3 VIEW RIGHT-  HISTORY: Female who is 89 years-old, trauma, sepsis  TECHNIQUE: Frontal view of the chest, frontal view of the pelvis, 2 views of the right hip, 3 views of the lumbar spine  COMPARISON: 6/8/2025  FINDINGS:  Chest x-ray: The heart is enlarged. Aorta is tortuous, calcified. Pulmonary vasculature is unremarkable. No focal pulmonary consolidation, pleural effusion, or pneumothorax. Old granulomatous disease is apparent. Chronic right rib and shoulder deformities. Chronic narrowing of the  left acromio humeral interval, compatible with rotator cuff pathology. No acute osseous process.  Lumbar spine: As position, lumbar dextroscoliosis is apparent. Mild anterior listhesis of L4 on L5, L5 on S1. Retrolisthesis of L1 on L2, L2 on L3. Vertebral body heights appear preserved. No acute fracture is identified. Multilevel endplate spurring, disc base narrowing, and facet arthropathy. Arterial calcifications are conspicuous.  Pelvis and right hip: No acute fracture, erosion, or dislocation is identified. Mild bilateral hip joint space narrowing. Mild colonic fecal retention is apparent. Arterial calcifications are seen.  Follow-up/further evaluation can be obtained as indications persist.      As described.  This report was finalized on 7/17/2025 8:04 AM by Dr. Esa Corea M.D on Workstation: [id number]          MEDICATIONS GIVEN IN ER  Medications   sodium chloride 0.9 % flush 10 mL (has no administration in time range)         ORDERS PLACED DURING THIS VISIT:  Orders Placed This Encounter   Procedures    Critical Care    Blood Culture - Blood,    Blood Culture - Blood,    XR Chest 1 View    XR Hip With or Without Pelvis 2 - 3 View Right    XR Spine Lumbar 2 or 3 View    [place] Draw    Comprehensive Metabolic Panel    Lactic Acid, Plasma    Urinalysis With Culture If Indicated - Urine, Catheter    CBC Auto Differential    Urinalysis, Microscopic Only - Urine, Clean Catch    Undress & Gown    Continuous Pulse Oximetry    Vital Signs    Yulissa davis  Misc Nursing Order (Specify)    Cardiology (on-call MD unless specified)    [facility] (on-call MD unless specified) Details    Oxygen Therapy- Nasal Cannula; Titrate 1-6 LPM Per SpO2; 90 - 95%    ECG 12 Lead Bradycardia    Telemetry Scan    Insert Peripheral IV    Initiate Observation Status    CBC & Differential    Green Top (Gel)    Lavender Top    Gold Top - SST    Light Blue Top         OUTPATIENT MEDICATION MANAGEMENT:  Current Facility-Administered Medications  Ordered in Epic   Medication Dose Route Frequency Provider Last Rate Last Admin    sodium chloride 0.9 % flush 10 mL  10 mL Intravenous PRN Qian Hernandez APRN         Current Outpatient Medications Ordered in Epic   Medication Sig Dispense Refill    atenolol (TENORMIN) 100 MG tablet Take 1 tablet (100 mg total) by mouth 2 (Two) Times a Day 240 tablet 1    carBAMazepine XR (TEGretol  XR) 100 MG 12 hr tablet Take 1 tablet by mouth 2 (Two) Times a Day. 180 tablet 4    doxazosin (CARDURA) 4 MG tablet Take 1 tablet by mouth every night at bedtime. 90 tablet 4    furosemide (LASIX) 20 MG tablet Take 1 tablet by mouth Daily.      levothyroxine (SYNTHROID, LEVOTHROID) 75 MCG tablet Take 1 tablet by mouth Every Morning.      spironolactone (ALDACTONE) 25 MG tablet Take 1 tablet by mouth Daily.      traZODone (DESYREL) 50 MG tablet Take 1 tablet by mouth Every Night.      acetaminophen (TYLENOL) 325 MG tablet Take 2 tablets by mouth Every 6 (Six) Hours As Needed for Mild Pain, Moderate Pain, Headache or Fever.      amLODIPine (NORVASC) 5 MG tablet Take 1 tablet by mouth Daily.      aspirin 81 MG EC tablet Take 1 tablet by mouth Daily.      brimonidine (ALPHAGAN) 0.2 % ophthalmic solution Administer 1 drop to both eyes 2 (Two) Times a Day.      calcium carbonate-vitamin d 600-400 MG-UNIT per tablet Take 1 tablet by mouth Daily.      cyclobenzaprine (FLEXERIL) 5 MG tablet Take 1 tablet by mouth 3 (Three) Times a Day As Needed for Muscle Spasms.      dorzolamide (TRUSOPT) 2 % ophthalmic solution 1 drop 3 (Three) Times a Day.      fluticasone (FLONASE) 50 MCG/ACT nasal spray Administer 2 sprays into the nostril(s) as directed by provider Daily.      Multiple Vitamins-Minerals (CENTRUM ADULTS PO) Take  by mouth.           PROCEDURES  Critical Care    Performed by: Qian Hernandez APRN  Authorized by: Huan Duque MD    Critical care provider statement:     Critical care time (minutes):  50    Critical care time was exclusive  of:  Separately billable procedures and treating other patients    Critical care was necessary to treat or prevent imminent or life-threatening deterioration of the following conditions:  Cardiac failure, dehydration, endocrine crisis, metabolic crisis, respiratory failure, sepsis and shock    Critical care was time spent personally by me on the following activities:  Blood draw for specimens, development of treatment plan with patient or surrogate, discussions with consultants, evaluation of patient's response to treatment, examination of patient, obtaining history from patient or surrogate, ordering and performing treatments and interventions, ordering and review of laboratory studies, ordering and review of radiographic studies, pulse oximetry, re-evaluation of patient's condition and review of old charts    Care discussed with: admitting provider    Comments:      Warming measures initially withg warm blankets, her rectal temp improved from 93-94, Yulissa hugger was initiated.  Suspect patient's intermittent bradycardia secondary to her hypothermia and with gentle warming measures this will improve.  Patient is quite frail.  She has had frequent falls, I am curious if she may be having intermittent bradycardia at home causing her to feel weak and fall if she denies any syncope, chest discomfort or palpitations.  She has known A-fib, I think it would be beneficial to have cardiology evaluate her.  I considered sepsis as an etiology for her hypothermia, she is denying any subjective infectious symptoms like respiratory, genitourinary or GI related issues.  Certainly hypothermia with some bradycardia would warrant concern for infectious process but given how reassuring her exam is I have elected not to ordered antibiotics as I do not think the benefits outweigh the potential risk.              PROGRESS, DATA ANALYSIS, CONSULTS, AND MEDICAL DECISION MAKING  All labs have been independently interpreted by me.  All  radiology studies have been reviewed by me. All EKG's have been independently viewed and interpreted by me.  Discussion below represents my analysis of pertinent findings related to patient's condition, differential diagnosis, treatment plan and final disposition.    Differential diagnosis includes but is not limited to mechanical fall, syncope, electrolyte derangement, sepsis.    Clinical Scores:                                       ED Course as of 07/17/25 1022   Thu Jul 17, 2025   0717 Hemoglobin(!): 11.1  10.8 1 mo ago [AH]   0741 Creatinine(!): 1.44  1.91 1 mo ago [AH]   0741 XR Chest 1 View  Per my independent interpretation is cardiomegaly , no appreciable infiltrates [AH]   0804 Advised by RN that on tele pt HR is dropping to the 30s and then coming back up to the 90s []   0834 ECG 12 Lead Bradycardia  Per my independent interpretation is atrial fibrillation, slow rate of 54, qtc 406, no evidence of st elevation/depression [AH]   0847 Phone call with dr. streeter.  Discussed the patient, relevant history, exam, diagnostics, ED findings/progress, and concerns.  They agree to consult.    []   0917 Phone call with dr. Liam reza.  Discussed the patient, relevant history, exam, diagnostics, ED findings/progress, and concerns.  They agree to admit to a tele bed   []      ED Course User Index  [] Qian Hernandez APRSEBASTIAN             AS OF 10:22 EDT VITALS:    BP - 177/99  HR - 65  TEMP - 94.2 °F (34.6 °C) (Rectal)  O2 SATS - 96%    COMPLEXITY OF CARE  The patient requires admission.      DIAGNOSIS  Final diagnoses:   Atrial fibrillation, unspecified type   Hypothermia, initial encounter         DISPOSITION  ED Disposition       ED Disposition   Decision to Admit    Condition   --    Comment   Level of Care: Telemetry [5]   Diagnosis: Hypothermia [198552]   Admitting Physician: LIAM REZA [[id number]]   Attending Physician: LIAM REZA [[id number]]   Is patient appropriate for Inpatient Observation  Unit?: Yes [1]                  Please note that portions of this document were completed with a voice recognition program.    Note Disclaimer: At [facility], we believe that sharing information builds trust and better relationships. You are receiving this note because you recently visited [facility]. It is possible you will see health information before a provider has talked with you about it. This kind of information can be easy to misunderstand. To help you fully understand what it means for your health, we urge you to discuss this note with your provider.         Qian Hernandez, ZEESHAN  07/17/25 1022

## 2025-07-17 NOTE — CONSULTS
Date of Consultation: 25    Referral Provider: Liam Bynum MD     Reason for Consultation: Bradycardia     Encounter Provider: ZEESHAN Bobby    Group of Service: [facility]     Patient Name: Inge Borjas    :1932    Chief complaint: Fall      History of Present Illness: Inge Borjas is a 89 year old who is followed by Dr. Bloom. She has known chronic atrial fibrillation, colon cancer, frailty, hypertension, and dyspnea on exertion. She recently lost her .     She presented to the ED 2025 after a mechanical fall. Her neighbor heard her calling for help around 3 AM. EMS noted her to be hypotensive and gave IV fluids. Her BP had normalized and was even hypertensive when she arrived to the ED. She was noted to be hypothermic. She did have periods of bradycardia where her heart rate dropped into the 30's but quickly recovered. EKG showed atrial fibrillation with a rate of 54 bpm.    She had a bear hugger place and her temperature has normalized. On review of her telemetry her heart rate has also normalized.     On exam she is resting in bed. She wants to go home. She reports she feels short of breath but it is no different than her baseline. She denies chest pain.     Echocardiogram 3/12/25    Left ventricular systolic function is normal. Left ventricular ejection fraction appears to be 61 - 65%.    Left ventricular diastolic function was indeterminate.    Moderately reduced right ventricular systolic function noted.    The right ventricular cavity is moderately dilated.    The left atrial cavity is moderately dilated.    The right atrial cavity is moderately  dilated.    There is mild calcification of the aortic valve.    There is bileaflet mitral valve thickening present.    Severe mitral valve regurgitation is present.    Severe tricuspid valve regurgitation is present.    Estimated right ventricular systolic pressure from tricuspid regurgitation is markedly  elevated (>55 mmHg).    Cardiac catheterization 3/25/25  Hemodynamics:   AO: 141/80  RA: 11/11/9  RV: 53/9  PA: 53/23/37  Wedge: 18/30/20  Saturations  PA 56%  AO 95%  Cardiac output: 1.9 L/min  Cardiac index 1.4 L/min/m²  Estimated blood loss: Minimal  Complications: None  Conclusions:   1. Left main: Normal  2. LAD: Diffuse 20 to 30% proximal stenosis.  Discrete 30 to 40% mid vessel stenosis.  Mid septal  branch with discrete 99% stenosis  3. LCX: Diffuse 20 to 30% mid vessel stenosis  4. RCA: Luminal irregularities proximal segment.  5.  Mildly elevated right atrial and wedge pressure.  Large V wave on wedge pressure.  6.  Moderate pulmonary hypertension    Past Medical History:   Diagnosis Date    Anemia     Arthritis     Atrial fibrillation     Intermittently    Colon cancer 2012    Stage III    Disease of thyroid gland     hypothyroid    Drug therapy 2012    pill for 6 months    H/O Herpes simplex     virus of the lip    Hyperlipidemia     Hypertension     Intervertebral disk disease     Polio     Trigeminal neuralgia          Past Surgical History:   Procedure Laterality Date    ARTHROSCOPY SHOULDER W/ OPEN ROTATOR CUFF REPAIR      BREAST BIOPSY Left     at least 25 years ago.    CARDIAC CATHETERIZATION  3/25/2025    Procedure: Right and Left Heart Cath;  Surgeon: Mukul Bloom MD;  Location:  [facility];  Service: Cardiology;;    CARDIAC CATHETERIZATION N/A 3/25/2025    Procedure: Coronary angiography;  Surgeon: Mukul Bloom MD;  Location:  [facility];  Service: Cardiology;  Laterality: N/A;    COLON RESECTION  08/2015    JOINT REPLACEMENT  1947    Elbow transplant and shoulder fusion    TONSILLECTOMY           Allergies   Allergen Reactions    Cardizem [Diltiazem Hcl] Unknown - Low Severity    Amoxicillin Rash         No current facility-administered medications on file prior to encounter.     Current Outpatient Medications on File Prior to  Encounter   Medication Sig Dispense Refill    atenolol (TENORMIN) 100 MG tablet Take 1 tablet (100 mg total) by mouth 2 (Two) Times a Day 240 tablet 1    carBAMazepine XR (TEGretol  XR) 100 MG 12 hr tablet Take 1 tablet by mouth 2 (Two) Times a Day. 180 tablet 4    doxazosin (CARDURA) 4 MG tablet Take 1 tablet by mouth every night at bedtime. 90 tablet 4    furosemide (LASIX) 20 MG tablet Take 1 tablet by mouth Daily.      levothyroxine (SYNTHROID, LEVOTHROID) 75 MCG tablet Take 1 tablet by mouth Every Morning.      spironolactone (ALDACTONE) 25 MG tablet Take 1 tablet by mouth Daily.      traZODone (DESYREL) 50 MG tablet Take 1 tablet by mouth Every Night.      acetaminophen (TYLENOL) 325 MG tablet Take 2 tablets by mouth Every 6 (Six) Hours As Needed for Mild Pain, Moderate Pain, Headache or Fever.      amLODIPine (NORVASC) 5 MG tablet Take 1 tablet by mouth Daily.      aspirin 81 MG EC tablet Take 1 tablet by mouth Daily.      brimonidine (ALPHAGAN) 0.2 % ophthalmic solution Administer 1 drop to both eyes 2 (Two) Times a Day.      calcium carbonate-vitamin d 600-400 MG-UNIT per tablet Take 1 tablet by mouth Daily.      cyclobenzaprine (FLEXERIL) 5 MG tablet Take 1 tablet by mouth 3 (Three) Times a Day As Needed for Muscle Spasms.      dorzolamide (TRUSOPT) 2 % ophthalmic solution 1 drop 3 (Three) Times a Day.      fluticasone (FLONASE) 50 MCG/ACT nasal spray Administer 2 sprays into the nostril(s) as directed by provider Daily.      Multiple Vitamins-Minerals (CENTRUM ADULTS PO) Take  by mouth.           Social History     Socioeconomic History    Marital status:      Spouse name: Burt   Tobacco Use    Smoking status: Never     Passive exposure: Never    Smokeless tobacco: Never   Vaping Use    Vaping status: Never Used   Substance and Sexual Activity    Alcohol use: No    Drug use: No    Sexual activity: Defer     Partners: Male         Family History   Problem Relation Age of Onset    Colon cancer  "Father         Late in life    Heart disease Father     No Known Problems Mother        REVIEW OF SYSTEMS:   12 point ROS was performed and is negative except as outlined in HPI       Objective:     Vitals:    07/17/25 0917 07/17/25 1100 07/17/25 1150 07/17/25 1345   BP:  130/82 151/98    BP Location:   Left arm    Patient Position:   Lying    Pulse:  52 69    Resp:   20    Temp: 94.2 °F (34.6 °C)  96.6 °F (35.9 °C) 97.4 °F (36.3 °C)   TempSrc: Rectal  Rectal Rectal   SpO2:  (!) 89% 92%    Weight:       Height:   152.4 cm (60\")      Body mass index is 20.12 kg/m².  Flowsheet Rows      Flowsheet Row First Filed Value   Admission Height 152.4 cm (60\") Documented at 07/17/2025 1150   Admission Weight 46.7 kg (103 lb) Documented at 07/17/2025 0618          Physical Exam  Vitals reviewed.   Constitutional:       General: She is not in acute distress.  HENT:      Head: Normocephalic.   Eyes:      Extraocular Movements: Extraocular movements intact.      Pupils: Pupils are equal, round, and reactive to light.   Cardiovascular:      Rate and Rhythm: Rhythm irregularly irregular.      Pulses: Normal pulses.      Heart sounds: Normal heart sounds, S1 normal and S2 normal. Heart sounds not distant. No murmur heard.     No friction rub. No gallop. No S3 or S4 sounds.   Pulmonary:      Effort: Pulmonary effort is normal.      Breath sounds: Normal breath sounds.   Abdominal:      General: Abdomen is flat. Bowel sounds are normal.      Palpations: Abdomen is soft.      Tenderness: There is no abdominal tenderness.   Skin:     General: Skin is warm and dry.      Findings: Bruising present.   Neurological:      General: No focal deficit present.      Mental Status: She is alert and oriented to person, place, and time.   Psychiatric:         Mood and Affect: Mood normal.         Behavior: Behavior normal.       Lab Review:                Results from last 7 days   Lab Units 07/17/25  0640   SODIUM mmol/L 134*   POTASSIUM mmol/L 5.0 "   CHLORIDE mmol/L 99   CO2 mmol/L 26.0   BUN mg/dL 40.0*   CREATININE mg/dL 1.44*   GLUCOSE mg/dL 110*   CALCIUM mg/dL 9.3         Results from last 7 days   Lab Units 07/17/25  0640   WBC 10*3/mm3 5.58   HEMOGLOBIN g/dL 11.1*   HEMATOCRIT % 35.1   PLATELETS 10*3/mm3 107*                       EKG:      Assessment/Plan:   Fall, weakness, frailty   Chronic atrial fibrillation   Bradycardia noted in the ED, likely secondary to hypothermia as this has normalized with her temperature. She denies symptoms with this.   Agree with holding atenolol, would resume tomorrow if no further bradycardia noted.   Chronic heart failure with preserved ejection fraction   She appears euvolemic on exam.   GDMT: Currently held - atenolol, furosemide, and spironolactone. Resume diuretics tomorrow if renal function stable.   Hypertension   Continue amlodipine.   Severe mitral valve regurgitation   She has previously been evaluated and is not a candidate for a mitral valve clip.     No further cardiac testing is indicated. Continue to monitor on telemetry.     Sonal Munroe, APRN   07/17/25     Addendum: I was notified her primary nurse Kerry that she had an episode of lethargy while ambulating to the chair. Review of telemetry showed a pause as noted below. Discussed with Dr. Montiel, will ask EP to evaluate.

## 2025-07-17 NOTE — ED PROVIDER NOTES
MD ATTESTATION NOTE    The DONNY and I have discussed this patient's history, physical exam, and treatment plan.  I have reviewed the documentation and personally had a face to face interaction with the patient. I affirm the documentation and agree with the treatment and plan.  The attached note describes my personal findings.      I provided a substantive portion of the care of the patient.  I personally performed the physical exam in its entirety, and below are my findings.        Brief HPI: This patient is a 89-year-old female with a history of nonanticoagulation dependent atrial fibrillation presenting to the ED today following a fall.  The fall was unwitnessed however she was overheard by neighbors calling out for help early this morning following the fall.  She denies striking her head or any headache.  She also denies chest pain, shortness of breath, abdominal pain, back pain, or nausea/vomiting.      PHYSICAL EXAM  ED Triage Vitals   Temp Heart Rate Resp BP SpO2   07/17/25 0622 07/17/25 0618 07/17/25 0617 07/17/25 0617 07/17/25 0618   93.6 °F (34.2 °C) 53 22 (!) 155/102 91 %      Temp src Heart Rate Source Patient Position BP Location FiO2 (%)   07/17/25 0622 07/17/25 0618 07/17/25 0617 07/17/25 0617 --   Rectal Monitor Sitting Left arm          GENERAL: Resting comfortably and in no acute distress, nontoxic in appearance  HENT: nares patent  EYES: no scleral icterus  CV: Irregularly irregular rhythm, normal rate, no M/R/G  RESPIRATORY: normal effort, lungs clear bilaterally  ABDOMEN: soft, nontender, no rebound or guarding  MUSCULOSKELETAL: no deformity, no edema  NEURO: alert, moves all extremities, follows commands  PSYCH:  calm, cooperative  SKIN: warm, dry    Vital signs and nursing notes reviewed.      Differential diagnosis includes but is not limited to sepsis, septic shock, acute bacteremia, acute anemia, severe electrolyte disturbance acute renal failure, electrolyte disturbance, or bony  trauma.      Plan: The patient was somewhat hypotensive and round and temperature shows her to be hypothermic.  We will obtain labs, UA, as well as x-rays of the chest, hip/pelvis, and lumbar spines in the ED today.  Further plan to follow.      Chest x-ray independently interpreted by myself with my interpretation showing no cardiomegaly nor edema, infiltrate, or pneumothorax.       Huan Duque MD  07/17/25 0822

## 2025-07-18 LAB
ANION GAP SERPL CALCULATED.3IONS-SCNC: 8.9 MMOL/L (ref 5–15)
BASOPHILS # BLD AUTO: 0.01 10*3/MM3 (ref 0–0.2)
BASOPHILS NFR BLD AUTO: 0.2 % (ref 0–1.5)
BUN SERPL-MCNC: 38 MG/DL (ref 8–23)
BUN/CREAT SERPL: 30.4 (ref 7–25)
CALCIUM SPEC-SCNC: 8.9 MG/DL (ref 8.2–9.6)
CHLORIDE SERPL-SCNC: 104 MMOL/L (ref 98–107)
CO2 SERPL-SCNC: 22.1 MMOL/L (ref 22–29)
CREAT SERPL-MCNC: 1.25 MG/DL (ref 0.57–1)
DEPRECATED RDW RBC AUTO: 52.7 FL (ref 37–54)
EGFRCR SERPLBLD CKD-EPI 2021: 40.5 ML/MIN/1.73
EOSINOPHIL # BLD AUTO: 0.04 10*3/MM3 (ref 0–0.4)
EOSINOPHIL NFR BLD AUTO: 0.7 % (ref 0.3–6.2)
ERYTHROCYTE [DISTWIDTH] IN BLOOD BY AUTOMATED COUNT: 14.2 % (ref 12.3–15.4)
GLUCOSE SERPL-MCNC: 79 MG/DL (ref 65–99)
HCT VFR BLD AUTO: 35.6 % (ref 34–46.6)
HGB BLD-MCNC: 10.9 G/DL (ref 12–15.9)
IMM GRANULOCYTES # BLD AUTO: 0.02 10*3/MM3 (ref 0–0.05)
IMM GRANULOCYTES NFR BLD AUTO: 0.3 % (ref 0–0.5)
LYMPHOCYTES # BLD AUTO: 1.07 10*3/MM3 (ref 0.7–3.1)
LYMPHOCYTES NFR BLD AUTO: 17.6 % (ref 19.6–45.3)
MAGNESIUM SERPL-MCNC: 2.6 MG/DL (ref 1.7–2.3)
MCH RBC QN AUTO: 31.1 PG (ref 26.6–33)
MCHC RBC AUTO-ENTMCNC: 30.6 G/DL (ref 31.5–35.7)
MCV RBC AUTO: 101.7 FL (ref 79–97)
MONOCYTES # BLD AUTO: 0.5 10*3/MM3 (ref 0.1–0.9)
MONOCYTES NFR BLD AUTO: 8.2 % (ref 5–12)
NEUTROPHILS NFR BLD AUTO: 4.44 10*3/MM3 (ref 1.7–7)
NEUTROPHILS NFR BLD AUTO: 73 % (ref 42.7–76)
NRBC BLD AUTO-RTO: 0 /100 WBC (ref 0–0.2)
PHOSPHATE SERPL-MCNC: 2.9 MG/DL (ref 2.5–4.5)
PLATELET # BLD AUTO: 101 10*3/MM3 (ref 140–450)
PMV BLD AUTO: 10.6 FL (ref 6–12)
POTASSIUM SERPL-SCNC: 4.8 MMOL/L (ref 3.5–5.2)
QT INTERVAL: 428 MS
QTC INTERVAL: 406 MS
RBC # BLD AUTO: 3.5 10*6/MM3 (ref 3.77–5.28)
SODIUM SERPL-SCNC: 135 MMOL/L (ref 136–145)
WBC NRBC COR # BLD AUTO: 6.08 10*3/MM3 (ref 3.4–10.8)

## 2025-07-18 PROCEDURE — 80048 BASIC METABOLIC PNL TOTAL CA: CPT | Performed by: INTERNAL MEDICINE

## 2025-07-18 PROCEDURE — 97162 PT EVAL MOD COMPLEX 30 MIN: CPT

## 2025-07-18 PROCEDURE — 84100 ASSAY OF PHOSPHORUS: CPT | Performed by: INTERNAL MEDICINE

## 2025-07-18 PROCEDURE — 97166 OT EVAL MOD COMPLEX 45 MIN: CPT

## 2025-07-18 PROCEDURE — 97530 THERAPEUTIC ACTIVITIES: CPT

## 2025-07-18 PROCEDURE — 83735 ASSAY OF MAGNESIUM: CPT | Performed by: INTERNAL MEDICINE

## 2025-07-18 PROCEDURE — 85025 COMPLETE CBC W/AUTO DIFF WBC: CPT | Performed by: INTERNAL MEDICINE

## 2025-07-18 PROCEDURE — 97535 SELF CARE MNGMENT TRAINING: CPT

## 2025-07-18 PROCEDURE — 99232 SBSQ HOSP IP/OBS MODERATE 35: CPT | Performed by: INTERNAL MEDICINE

## 2025-07-18 PROCEDURE — 99222 1ST HOSP IP/OBS MODERATE 55: CPT

## 2025-07-18 RX ORDER — ATENOLOL 25 MG/1
25 TABLET ORAL
Status: DISCONTINUED | OUTPATIENT
Start: 2025-07-19 | End: 2025-07-19 | Stop reason: HOSPADM

## 2025-07-18 RX ORDER — SPIRONOLACTONE 25 MG/1
25 TABLET ORAL DAILY
Status: DISCONTINUED | OUTPATIENT
Start: 2025-07-18 | End: 2025-07-19 | Stop reason: HOSPADM

## 2025-07-18 RX ORDER — FUROSEMIDE 20 MG/1
20 TABLET ORAL DAILY
Status: DISCONTINUED | OUTPATIENT
Start: 2025-07-18 | End: 2025-07-19 | Stop reason: HOSPADM

## 2025-07-18 RX ORDER — TERAZOSIN 5 MG/1
5 CAPSULE ORAL NIGHTLY
Status: DISCONTINUED | OUTPATIENT
Start: 2025-07-18 | End: 2025-07-19 | Stop reason: HOSPADM

## 2025-07-18 RX ADMIN — DORZOLAMIDE HYDROCHLORIDE 1 DROP: 20 SOLUTION/ DROPS OPHTHALMIC at 08:53

## 2025-07-18 RX ADMIN — Medication 10 ML: at 09:48

## 2025-07-18 RX ADMIN — SPIRONOLACTONE 25 MG: 25 TABLET ORAL at 08:53

## 2025-07-18 RX ADMIN — TRAZODONE HYDROCHLORIDE 50 MG: 50 TABLET ORAL at 20:09

## 2025-07-18 RX ADMIN — CARBAMAZEPINE 100 MG: 100 TABLET, EXTENDED RELEASE ORAL at 08:54

## 2025-07-18 RX ADMIN — FUROSEMIDE 20 MG: 20 TABLET ORAL at 14:24

## 2025-07-18 RX ADMIN — AMLODIPINE BESYLATE 5 MG: 5 TABLET ORAL at 08:53

## 2025-07-18 RX ADMIN — Medication 10 ML: at 20:10

## 2025-07-18 RX ADMIN — BRIMONIDINE TARTRATE 1 DROP: 2 SOLUTION/ DROPS OPHTHALMIC at 08:53

## 2025-07-18 RX ADMIN — CARBAMAZEPINE 100 MG: 100 TABLET, EXTENDED RELEASE ORAL at 20:09

## 2025-07-18 RX ADMIN — HYDRALAZINE HYDROCHLORIDE 25 MG: 25 TABLET ORAL at 08:53

## 2025-07-18 RX ADMIN — DORZOLAMIDE HYDROCHLORIDE 1 DROP: 20 SOLUTION/ DROPS OPHTHALMIC at 20:09

## 2025-07-18 RX ADMIN — BRIMONIDINE TARTRATE 1 DROP: 2 SOLUTION/ DROPS OPHTHALMIC at 20:09

## 2025-07-18 RX ADMIN — ASPIRIN 81 MG: 81 TABLET, COATED ORAL at 20:09

## 2025-07-18 RX ADMIN — LEVOTHYROXINE SODIUM 75 MCG: 0.07 TABLET ORAL at 08:54

## 2025-07-18 RX ADMIN — TERAZOSIN 5 MG: 5 CAPSULE ORAL at 20:09

## 2025-07-18 NOTE — PLAN OF CARE
Goal Outcome Evaluation:  Plan of Care Reviewed With: patient           Outcome Evaluation: Inge Borjas is a 89 year old female seen for physical therapy evaluation after being admitted for hypothermia/fall at home. She reports she lives at [facility], uses a RW, is indep at [facility], and she notes she does wear O2 at [facility], but is unsure what # of liters. She denies pain. She performs bed mob with standby A, STS with CGA and LHHA, and takes several side steps along EOB with CGA and LHHA. Pt. limited throughuot R hand, appears contracted and requests PT does not touch it. PT rec HH vs SNF at d/c pending progress.    Anticipated Discharge Disposition (PT): skilled nursing facility, home with home health

## 2025-07-18 NOTE — CASE MANAGEMENT/SOCIAL WORK
"Continued Stay Note  [facility]     Patient Name: Inge Borjas  MRN: [id number]  Today's Date: 7/18/2025    Admit Date: 7/17/2025    Plan: plans to return to [facility] with [facility] following for PT, OT, and skilled nursing HH services   Discharge Plan       Row Name 07/18/25 1419       Plan    Plan plans to return to [facility] ILF with [facility] following for PT, OT, and skilled nursing HH services    Patient/Family in Agreement with Plan yes    Provided Post Acute Provider List? Refused    Refused Provider List Comment Per patient prefers [facility], used in the past    Plan Comments [facility] RN following for home health/DME set up. Patient chose [facility] and referral accepted and [facility] Patient denied any DME needs at this time. Ariana KO updated. -Jyoti Doran [facility] RN      Row Name 07/18/25 0834       Plan    Plan [facility] [place] via family, [facility] referral pending.    Patient/Family in Agreement with Plan yes    Plan Comments CCP s/w Precious, pts HCS, via phone. Introduced self and role of CCP. Face sheet information and pharmacy verified. [place] lives [place] at [facility]. Pt does not drive, mostly IADL's and has a walker. Pt has assistance with bathing, laundry, cleaning and meals. Pt has a living will. Precious declines meds to beds and denies trouble affording or managing her medications. Precious has used [facility] in the past and has been to [facility] for SNF. CCP discussed PT recs for SNF vs HH and also discussed Medicare guidelines for Inpt vs Obs. Precious verbalized understanding and is agreeable to HH referral. Per Precious \"Pt has had a lot of changes recently and would like for her to return to [facility]\". LVM for Trey/[facility] to confirm pts return and report/fax numbers if needed. Jyoti/[facility] notified of referral. Adrian GAVIN/CCP                   Discharge Codes    No documentation.             "         Signed,  Jyoti Doran RN, BSN, BA  Post Acute Care Coordinator  [facility]  (C)[phone]  (O)[phone]

## 2025-07-18 NOTE — PLAN OF CARE
Goal Outcome Evaluation:  Plan of Care Reviewed With: patient        Progress: improving  Outcome Evaluation: Pt is a 89 year old female admitted to [facility] 7/17 with a fall at home, hypothermia, CHF, and malnutrition. Pts past medical history including but not limited too: HTN, polio, afib with resdual RUE impairments from polio. Pt is a new resident to [facility] to which she reports she is (I) at baseline with RW for ADLs and in room mobility, reports she does not use oxygen normally at home. She verb'd feeling near her baseline but had increased difficulty coming to stand today and requiring increased time, cues, and CGA to min A. Pt also w/ difficulty standing up from bathroom commode, verb'd her grab bars are on the opposite side as set up here. Pt cont'd following ADLs in the room around the unit w/ the walker and close SBA/CGA. Hopeful with continued therapy while IP she will regain function and be safe to return back to [facility] with recommending continued therapy the next level of care. Pt was somewhat receptive to education.

## 2025-07-18 NOTE — CASE MANAGEMENT/SOCIAL WORK
"Discharge Planning Assessment  [facility]     Patient Name: Inge Borjas  MRN: [id number]  Today's Date: 7/18/2025    Admit Date: 7/17/2025    Plan: [facility] [place] via family, [facility] referral pending.   Discharge Needs Assessment       Row Name 07/18/25 1345       Living Environment    People in Home alone    Current Living Arrangements assisted living facility    Family Caregiver if Needed other (see comments)    Quality of Family Relationships helpful;supportive;involved    Able to Return to Prior Arrangements yes       Transition Planning    Patient/Family Anticipates Transition to home with family    Patient/Family Anticipated Services at Transition     Transportation Anticipated family or friend will provide       Discharge Needs Assessment    Readmission Within the Last 30 Days no previous admission in last 30 days    Equipment Currently Used at Home walker, rolling    Concerns to be Addressed discharge planning    Outpatient/Agency/Support Group Needs homecare agency    Discharge Facility/Level of Care Needs home with home health                   Discharge Plan       Row Name 07/18/25 1346       Plan    Plan [facility] [place] via family, [facility] referral pending.    Patient/Family in Agreement with Plan yes    Plan Comments CCP s/w Precious, pts HCS, via phone. Introduced self and role of CCP. Face sheet information and pharmacy verified. [place] [facility]. Pt does not drive, mostly IADL's and has a walker. Pt has assistance with bathing, laundry, cleaning and meals. Pt has a living will. Precious declines meds to beds and denies trouble affording or managing her medications. Precious has used [facility]  in the past and has been to [facility] for SNF. CCP discussed PT recs for SNF vs HH and also discussed Medicare guidelines for Inpt vs Obs. Precious verbalized understanding and is agreeable to HH referral. Per Precious \"Pt has had a lot of changes recently and would like for her to return to " "[facility]\". [facility] for [facility] to confirm pts return and report/fax numbers if needed. Jyoti/Shanel  notified of referral. Adrian RN/CCP                  Continued Care and Services - Admitted Since 7/17/2025       Home Medical Care       Service Provider Request Status Services Address Phone Fax Patient Preferred    [facility] [place] Pending - Request Sent -- [address], [address] [id number] [phone] --                  Selected Continued Care - Prior Encounters Includes continued care and service providers with selected services from prior encounters from 4/18/2025 to 7/18/2025      Discharged on 6/12/2025 Admission date: 6/8/2025 - Discharge disposition: Skilled Nursing Facility (DC - External)      Destination       Service Provider Services Address Phone Fax Patient Preferred    [facility] [address] [facility] [id number] [phone] --                             Demographic Summary       Row Name 07/18/25 1345       General Information    Admission Type observation    Arrived From home    Required Notices Provided Observation Status Notice    Referral Source admission list;case finding    Reason for Consult discharge planning    Preferred Language English       Contact Information    Permission Granted to Share Info With                    Functional Status       Row Name 07/18/25 1340       Functional Status    Usual Activity Tolerance moderate    Current Activity Tolerance fair       Assessment of Health Literacy    How often do you have someone help you read hospital materials? Occasionally    How often do you have problems learning about your medical condition because of difficulty understanding written information? Occasionally    How often do you have a problem understanding what is told to you about your medical condition? Occasionally    " How confident are you filling out medical forms by yourself? Quite a bit    Health Literacy Good       Functional Status, IADL    Medications independent    Meal Preparation assistive person    Housekeeping assistive person    Laundry assistive person    Shopping assistive person                               Ariana Coy, RN

## 2025-07-18 NOTE — DISCHARGE PLACEMENT REQUEST
"Inge Borjas (89 y.o. Female)       Date of Birth   08/06/1932    Social Security Number       Address   [address] [place] [address] [place] [address]    Home Phone   [phone]    MRN   [id number]       [facility]    Marital Status                               Admission Date   7/17/2025    Admission Type   Emergency    Admitting Provider   Liam Bynum MD    Attending Provider   Liam Bynum MD    Department, Room/Bed   [address], S[address]/1       Discharge Date       Discharge Disposition       Discharge Destination                                 Attending Provider: Liam Bynum MD    Allergies: Cardizem [Diltiazem Hcl], Amoxicillin    Isolation: None   Infection: None   Code Status: No CPR    Ht: 152.4 cm (60\")   Wt: 46.7 kg (103 lb)    Admission Cmt: None   Principal Problem: Hypothermia [T68.XXXA]                   Active Insurance as of 7/17/2025       Primary Coverage       Payor Plan Insurance Group Employer/Plan Group    MEDICARE MEDICARE A & B        Payor Plan Address Payor Plan Phone Number Payor Plan Fax Number Effective Dates    [address] [phone]  10/1/1999 - None Entered    [facility] [id number]         Subscriber Name Subscriber Birth Date Member ID       INGE BORJAS 8/6/1932 [id number]               Secondary Coverage       Payor Plan Insurance Group Employer/Plan Group    PH[employer] [place] [employer] F       Payor Plan Address Payor Plan Phone Number Payor Plan Fax Number Effective Dates    ATTN CLAIMS DEPT [phone]  6/1/2002 - None Entered    [address] [place] [id number]         Subscriber Name Subscriber Birth Date Member ID       INGE BORJAS 8/6/1932 [id number]                     Emergency Contacts        (Rel.) Home Phone Work Phone Mobile Phone    Precious Paula (HCS) (Friend) -- -- [phone]                "

## 2025-07-18 NOTE — THERAPY EVALUATION
Patient Name: Inge Borjas  : 1932    MRN: [id number]                              Today's Date: 2025       Admit Date: 2025    Visit Dx:     ICD-10-CM ICD-9-CM   1. Atrial fibrillation, unspecified type  I48.91 427.31   2. Hypothermia, initial encounter  T68.XXXA 991.6     Patient Active Problem List   Diagnosis    Trigeminal neuralgia    Malignant neoplasm of sigmoid colon    COLVIN (dyspnea on exertion)    Unstable angina    Dyspnea on exertion    Nonrheumatic tricuspid valve regurgitation    Mitral regurgitation    Shortness of breath    Rhinovirus infection    Weakness    Falls    A-fib    Essential hypertension    Acute on chronic diastolic CHF (congestive heart failure)    Hypothyroidism (acquired)    Hyperlipidemia    Acute kidney failure    Chronic kidney failure, stage 3 (moderate)    Anemia    Moderate protein-calorie malnutrition    UTI (urinary tract infection)    Hypothermia    Chronic diastolic CHF (congestive heart failure)    Chronic a-fib     Past Medical History:   Diagnosis Date    Anemia     Arthritis     Atrial fibrillation     Intermittently    Colon cancer     Stage III    Disease of thyroid gland     hypothyroid    Drug therapy 2012    pill for 6 months    H/O Herpes simplex     virus of the lip    Hyperlipidemia     Hypertension     Intervertebral disk disease     Polio     Trigeminal neuralgia      Past Surgical History:   Procedure Laterality Date    ARTHROSCOPY SHOULDER W/ OPEN ROTATOR CUFF REPAIR      BREAST BIOPSY Left     at least 25 years ago.    CARDIAC CATHETERIZATION  3/25/2025    Procedure: Right and Left Heart Cath;  Surgeon: Mukul Bloom MD;  Location:  [facility];  Service: Cardiology;;    CARDIAC CATHETERIZATION N/A 3/25/2025    Procedure: Coronary angiography;  Surgeon: Mukul Bloom MD;  Location:  [facility];  Service: Cardiology;  Laterality: N/A;    COLON RESECTION  2015    JOINT REPLACEMENT   1947    Elbow transplant and shoulder fusion    TONSILLECTOMY        General Information       Row Name 07/18/25 1127          Physical Therapy Time and Intention    Document Type evaluation  -ER     Mode of Treatment individual therapy  -ER       Row Name 07/18/25 1127          General Information    Patient Profile Reviewed yes  -ER     Prior Level of Function --  reports she is from [place]  -ER     Existing Precautions/Restrictions fall  -ER     Barriers to Rehab none identified  -ER       Row Name 07/18/25 1127          Living Environment    Current Living Arrangements assisted living facility  -ER     People in Home alone;facility resident  -ER       Row Name 07/18/25 1127          Home Main Entrance    Number of Stairs, Main Entrance none  -ER       Row Name 07/18/25 1127          Stairs Within Home, Primary    Number of Stairs, Within Home, Primary none  -ER       Row Name 07/18/25 1127          Cognition    Orientation Status (Cognition) oriented x 3  -ER       Row Name 07/18/25 1127          Safety Issues/Impairments Affecting Functional Mobility    Impairments Affecting Function (Mobility) balance;strength  -ER     Comment, Safety Issues/Impairments (Mobility) Gait belt and non skid socks donned  -ER               User Key  (r) = Recorded By, (t) = Taken By, (c) = Cosigned By      Initials Name Provider Type    ER Yaima Covarrubias, PT Physical Therapist                   Mobility       Row Name 07/18/25 1128          Bed Mobility    Bed Mobility bed mobility (all) activities  -ER     All Activities, [place] (Bed Mobility) standby assist  -ER     Assistive Device (Bed Mobility) bed rails  -ER       Row Name 07/18/25 1128          Sit-Stand Transfer    Sit-Stand [place] (Transfers) contact guard  -ER     Comment, (Sit-Stand Transfer) L HHA  -ER       Row Name 07/18/25 1128          Gait/Stairs (Locomotion)    [place] Level (Gait) contact guard  -ER     Assistive Device (Gait) other (see comments)  L  HHA  -ER     Patient was able to Ambulate yes  -ER     Distance in Feet (Gait) 4  -ER     Deviations/Abnormal Patterns (Gait) bilateral deviations;gait speed decreased;base of support, narrow  -ER     Bilateral Gait Deviations forward flexed posture  -ER               User Key  (r) = Recorded By, (t) = Taken By, (c) = Cosigned By      Initials Name Provider Type    ER Yaima Covarrubias, PT Physical Therapist                   Obj/Interventions       Row Name 07/18/25 1128          Range of Motion Comprehensive    General Range of Motion no range of motion deficits identified  -ER       Row Name 07/18/25 1128          Strength Comprehensive (MMT)    Comment, General Manual Muscle Testing (MMT) Assessment Generalized BLE weakness  -ER       Row Name 07/18/25 1128          Balance    Balance Assessment sitting static balance;standing static balance;standing dynamic balance;sitting dynamic balance  -ER     Static Sitting Balance standby assist  -ER     Dynamic Sitting Balance standby assist  -ER     Position, Sitting Balance sitting edge of bed;unsupported  -ER     Static Standing Balance contact guard  -ER     Dynamic Standing Balance contact guard  -ER     Position/Device Used, Standing Balance supported;other (see comments)  L HHA  -ER     Balance Interventions sitting;standing;sit to stand;static;dynamic;supported  -ER       Row Name 07/18/25 1128          Sensory Assessment (Somatosensory)    Sensory Assessment (Somatosensory) sensation intact  -ER               User Key  (r) = Recorded By, (t) = Taken By, (c) = Cosigned By      Initials Name Provider Type    ER Yaima Covarrubias, PT Physical Therapist                   Goals/Plan       Row Name 07/18/25 1131          Bed Mobility Goal 1 (PT)    Activity/Assistive Device (Bed Mobility Goal 1, PT) bed mobility activities, all  -ER     [place] Level/Cues Needed (Bed Mobility Goal 1, PT) modified independence  -ER     Time Frame (Bed Mobility Goal 1, PT) 2 weeks  -ER        Row Name 07/18/25 1131          Transfer Goal 1 (PT)    Activity/Assistive Device (Transfer Goal 1, PT) transfers, all  -ER     [place] Level/Cues Needed (Transfer Goal 1, PT) supervision required  -ER     Time Frame (Transfer Goal 1, PT) 2 weeks  -ER       Row Name 07/18/25 1131          Gait Training Goal 1 (PT)    Activity/Assistive Device (Gait Training Goal 1, PT) gait (walking locomotion)  -ER     [place] Level (Gait Training Goal 1, PT) supervision required  -ER     Distance (Gait Training Goal 1, PT) 45  -ER     Time Frame (Gait Training Goal 1, PT) 2 weeks  -ER       Row Name 07/18/25 1131          Therapy Assessment/Plan (PT)    Planned Therapy Interventions (PT) balance training;bed mobility training;gait training;home exercise program;patient/family education;stretching;strengthening;neuromuscular re-education;ROM (range of motion);postural re-education;transfer training  -ER               User Key  (r) = Recorded By, (t) = Taken By, (c) = Cosigned By      Initials Name Provider Type    ER Yaima Covarrubias, PT Physical Therapist                   Clinical Impression       Row Name 07/18/25 1129          Pain    Pretreatment Pain Rating 0/10 - no pain  -ER     Posttreatment Pain Rating 0/10 - no pain  -ER       Row Name 07/18/25 1129          Plan of Care Review    Plan of Care Reviewed With patient  -ER     Outcome Evaluation Inge Borjas is a 89 year old female seen for physical therapy evaluation after being admitted for hypothermia/fall at home. She reports she lives at [facility], uses a RW, is indep at [facility], and she notes she does wear O2 at [facility], but is unsure what # of liters. She denies pain. She performs bed mob with standby A, STS with CGA and LHHA, and takes several side steps along EOB with CGA and LHHA. Pt. limited throughuot R hand, appears contracted and requests PT does not touch it. PT rec HH vs SNF at d/c pending progress.  -ER       Row Name 07/18/25 1129          Therapy Assessment/Plan (PT)     Criteria for Skilled Interventions Met (PT) yes  -ER     Therapy Frequency (PT) 5 times/wk  -ER       Row Name 07/18/25 1129          Positioning and Restraints    Pre-Treatment Position in bed  -ER     Post Treatment Position bed  -ER     In Bed notified nsg;call light within reach;encouraged to call for assist;exit alarm on  -ER               User Key  (r) = Recorded By, (t) = Taken By, (c) = Cosigned By      Initials Name Provider Type    ER Yaima Covarrubias PT Physical Therapist                   Outcome Measures       Row Name 07/18/25 1131 07/18/25 0809       How much help from another person do you currently need...    Turning from your back to your side while in flat bed without using bedrails? 4  -ER 4  -MS    Moving from lying on back to sitting on the side of a flat bed without bedrails? 3  -ER 3  -MS    Moving to and from a bed to a chair (including a wheelchair)? 3  -ER 3  -MS    Standing up from a chair using your arms (e.g., wheelchair, bedside chair)? 3  -ER 2  -MS    Climbing 3-5 steps with a railing? 2  -ER 2  -MS    To walk in hospital room? 2  -ER 2  -MS    AM-PAC 6 Clicks Score (PT) 17  -ER 16  -MS    Highest Level of Mobility Goal Stand (1 or More Minutes)-5  -ER Stand (1 or More Minutes)-5  -MS      Row Name 07/18/25 1131          Functional Assessment    Outcome Measure Options AM-PAC 6 Clicks Basic Mobility (PT)  -ER               User Key  (r) = Recorded By, (t) = Taken By, (c) = Cosigned By      Initials Name Provider Type    Patti Matthew, RN Registered Nurse    Yaima Mcmillan PT Physical Therapist                                 Physical Therapy Education       Title: PT OT SLP Therapies (In Progress)       Topic: Physical Therapy (Done)       Point: Mobility training (Done)       Learning Progress Summary            Patient Acceptance, E, VU,NR by ER at 7/18/2025 1132                      Point: Home exercise program (Done)       Learning Progress Summary            Patient  Acceptance, E, VU,NR by ER at 7/18/2025 1132                      Point: Body mechanics (Done)       Learning Progress Summary            Patient Acceptance, E, VU,NR by ER at 7/18/2025 1132                      Point: Precautions (Done)       Learning Progress Summary            Patient Acceptance, E, VU,NR by ER at 7/18/2025 1132                                      User Key       Initials Effective Dates Name Provider Type Discipline    ER 10/15/23 -  Yaima Covarrubias, PT Physical Therapist PT                  PT Recommendation and Plan  Planned Therapy Interventions (PT): balance training, bed mobility training, gait training, home exercise program, patient/family education, stretching, strengthening, neuromuscular re-education, ROM (range of motion), postural re-education, transfer training  Outcome Evaluation: Inge Borjas is a 89 year old female seen for physical therapy evaluation after being admitted for hypothermia/fall at home. She reports she lives at [facility], uses a RW, is indep at [facility], and she notes she does wear O2 at [facility], but is unsure what # of liters. She denies pain. She performs bed mob with standby A, STS with CGA and LHHA, and takes several side steps along EOB with CGA and LHHA. Pt. limited throughuot R hand, appears contracted and requests PT does not touch it. PT rec HH vs SNF at d/c pending progress.     Time Calculation:         PT Charges       Row Name 07/18/25 1133             Time Calculation    Start Time 1010  -ER      Stop Time 1029  -ER      Time Calculation (min) 19 min  -ER      PT Received On 07/18/25  -ER      PT - Next Appointment 07/21/25  -ER      PT Goal Re-Cert Due Date 08/01/25  -ER         Time Calculation- PT    Total Timed Code Minutes- PT 15 minute(s)  -ER         Timed Charges    83396 - PT Therapeutic Activity Minutes 15  -ER         Total Minutes    Timed Charges Total Minutes 15  -ER       Total Minutes 15  -ER                User Key  (r) = Recorded By, (t) = Taken By, (c) =  Cosigned By      Initials Name Provider Type    ER Yaima Covarrubias, PT Physical Therapist                  Therapy Charges for Today       Code Description Service Date Service Provider Modifiers Qty    09630981682 HC PT THERAPEUTIC ACT EA 15 MIN 7/18/2025 Yaima Covarrubias, PT GP 1    [id number] HC PT EVAL MOD COMPLEXITY 3 7/18/2025 Yaima Covarrubias, PT GP 1            PT G-Codes  Outcome Measure Options: AM-PAC 6 Clicks Basic Mobility (PT)  AM-PAC 6 Clicks Score (PT): 17  PT Discharge Summary  Anticipated Discharge Disposition (PT): skilled nursing facility, home with home health    Yaima Covarrubias, PT  7/18/2025

## 2025-07-18 NOTE — THERAPY EVALUATION
Patient Name: nIge Borjas  : 1932    MRN: [id number]                              Today's Date: 2025       Admit Date: 2025    Visit Dx:     ICD-10-CM ICD-9-CM   1. Atrial fibrillation, unspecified type  I48.91 427.31   2. Hypothermia, initial encounter  T68.XXXA 991.6   3. Chronic a-fib  I48.20 427.31   4. Chronic diastolic CHF (congestive heart failure)  I50.32 428.32     428.0   5. Anemia, unspecified type  D64.9 285.9     Patient Active Problem List   Diagnosis    Trigeminal neuralgia    Malignant neoplasm of sigmoid colon    COLVIN (dyspnea on exertion)    Unstable angina    Dyspnea on exertion    Nonrheumatic tricuspid valve regurgitation    Mitral regurgitation    Shortness of breath    Rhinovirus infection    Weakness    Falls    A-fib    Essential hypertension    Acute on chronic diastolic CHF (congestive heart failure)    Hypothyroidism (acquired)    Hyperlipidemia    Acute kidney failure    Chronic kidney failure, stage 3 (moderate)    Anemia    Moderate protein-calorie malnutrition    UTI (urinary tract infection)    Hypothermia    Chronic diastolic CHF (congestive heart failure)    Chronic a-fib     Past Medical History:   Diagnosis Date    Anemia     Arthritis     Atrial fibrillation     Intermittently    Colon cancer 2012    Stage III    Disease of thyroid gland     hypothyroid    Drug therapy 2012    pill for 6 months    H/O Herpes simplex     virus of the lip    Hyperlipidemia     Hypertension     Intervertebral disk disease     Polio     Trigeminal neuralgia      Past Surgical History:   Procedure Laterality Date    ARTHROSCOPY SHOULDER W/ OPEN ROTATOR CUFF REPAIR      BREAST BIOPSY Left     at least 25 years ago.    CARDIAC CATHETERIZATION  3/25/2025    Procedure: Right and Left Heart Cath;  Surgeon: Mukul Bloom MD;  Location: [facility] CATH INVASIVE LOCATION;  Service: Cardiology;;    CARDIAC CATHETERIZATION N/A 3/25/2025    Procedure: Coronary angiography;  Surgeon:  Mukul Bloom MD;  Location: [facility] INVASIVE LOCATION;  Service: Cardiology;  Laterality: N/A;    COLON RESECTION  08/2015    JOINT REPLACEMENT  1947    Elbow transplant and shoulder fusion    TONSILLECTOMY        General Information       Row Name 07/18/25 1525          OT Time and Intention    Document Type evaluation  -BC     Mode of Treatment individual therapy;occupational therapy  -BC     Patient Effort good  -BC       Row Name 07/18/25 1525          General Information    Patient Profile Reviewed yes  -BC     Prior Level of Function independent:;ADL's  Pt newly resides at [facility], she isn't sure if staff can assist her or not, reports (I) w/ walker at baseline.  -BC     Existing Precautions/Restrictions fall  -BC     Barriers to Rehab none identified  -BC       Row Name 07/18/25 1525          Living Environment    Current Living Arrangements assisted living facility  -BC     People in Home alone  -BC       Row Name 07/18/25 1525          Home Main Entrance    Number of Stairs, Main Entrance none  -BC       Row Name 07/18/25 1525          Cognition    Orientation Status (Cognition) oriented x 3  -BC       Row Name 07/18/25 1525          Safety Issues/Impairments Affecting Functional Mobility    Impairments Affecting Function (Mobility) balance;endurance/activity tolerance;strength  -BC               User Key  (r) = Recorded By, (t) = Taken By, (c) = Cosigned By      Initials Name Provider Type    BC Ria Tang OT Occupational Therapist                     Mobility/ADL's       Row Name 07/18/25 1527          Bed Mobility    Bed Mobility supine-sit;sit-supine  -BC     Supine-Sit [place] (Bed Mobility) standby assist  -BC     Sit-Supine [place] (Bed Mobility) standby assist  -BC     Assistive Device (Bed Mobility) bed rails  -BC       Row Name 07/18/25 1527          Transfers    Transfers sit-stand transfer;stand-sit transfer;toilet transfer  -BC       Row Name 07/18/25 1527           Sit-Stand Transfer    Sit-Stand [place] (Transfers) contact guard;minimum assist (75% patient effort)  -BC     Comment, (Sit-Stand Transfer) Pt had difficulty with sit>stand from EOB with mutliple attempts to complete herself. Small boost up with min A initially, Practiced 2 more stands and with L hand on bedrail and HHA on R Pt was able to stand on 3rd trial w/ improved performance.  -BC       Row Name 07/18/25 1527          Stand-Sit Transfer    Stand-Sit [place] (Transfers) contact guard  -BC       Row Name 07/18/25 1527          Toilet Transfer    Type (Toilet Transfer) sit-stand;stand-sit  -BC     [place] Level (Toilet Transfer) minimum assist (75% patient effort);1 person assist;verbal cues;nonverbal cues (demo/gesture)  -BC     Assistive Device (Toilet Transfer) commode  -BC       Row Name 07/18/25 1527          Functional Mobility    Functional Mobility- Ind. Level contact guard assist;standby assist  -BC     Functional Mobility- Comment Close SBA/CGA w/ FWW around the unit following ADLs in the room per Pt request for increased tolerance/endurance for return back to [facility].  -BC     Patient was able to [place] yes  -BC       Row Name 07/18/25 1527          Activities of Daily Living    BADL Assessment/Intervention lower body dressing;grooming;toileting;upper body dressing  -BC       Row Name 07/18/25 1527          Lower Body Dressing Assessment/Training    [place] Level (Lower Body Dressing) don;shoes/slippers;set up;doff  -BC       Row Name 07/18/25 1527          Grooming Assessment/Training    [place] Level (Grooming) wash face, hands;standby assist  -BC     Position (Grooming) supported standing;sink side  -BC       Row Name 07/18/25 1527          Toileting Assessment/Training    [place] Level (Toileting) toileting skills;adjust/manage clothing;standby assist  -BC     Assistive Devices (Toileting) commode  -BC     Position (Toileting) supported sitting;supported standing   -BC     Comment, (Toileting) Pt was able to perform all steps for toileting (I).  -BC       Row Name 07/18/25 1527          Upper Body Dressing Assessment/Training    [place] Level (Upper Body Dressing) upper body dressing skills;minimum assist (75% patient effort)  -BC     Comment, (Upper Body Dressing) min A in standing w/ help w/ gown mgmt in the back  -BC               User Key  (r) = Recorded By, (t) = Taken By, (c) = Cosigned By      Initials Name Provider Type    Ria Jordan OT Occupational Therapist                   Obj/Interventions       Row Name 07/18/25 1534          Sensory Assessment (Somatosensory)    Sensory Assessment (Somatosensory) sensation intact  -BC       Row Name 07/18/25 1534          Range of Motion Comprehensive    Comment, General Range of Motion RUE impaired range/strength baseline due to hx of polio  -BC       Row Name 07/18/25 1534          Strength Comprehensive (MMT)    Comment, General Manual Muscle Testing (MMT) Assessment gen'd weakness w/ RUE impaired at baseline.  -BC       Row Name 07/18/25 1534          Balance    Balance Assessment sitting static balance;sitting dynamic balance;standing static balance;standing dynamic balance  -BC     Static Sitting Balance standby assist  -BC     Dynamic Sitting Balance standby assist  -BC     Position, Sitting Balance sitting edge of bed;unsupported  -BC     Static Standing Balance standby assist  -BC     Dynamic Standing Balance contact guard  -BC     Position/Device Used, Standing Balance supported;walker, front-wheeled  -BC               User Key  (r) = Recorded By, (t) = Taken By, (c) = Cosigned By      Initials Name Provider Type    Ria Jordan OT Occupational Therapist                   Goals/Plan       Row Name 07/18/25 1540          Bed Mobility Goal 1 (OT)    Activity/Assistive Device (Bed Mobility Goal 1, OT) bed mobility activities, all  -BC     [place] Level/Cues Needed (Bed Mobility Goal 1, OT)  modified independence  -BC     Time Frame (Bed Mobility Goal 1, OT) short term goal (STG);2 weeks  -BC     Progress/Outcomes (Bed Mobility Goal 1, OT) new goal  -BC       Row Name 07/18/25 1544          Transfer Goal 1 (OT)    Activity/Assistive Device (Transfer Goal 1, OT) sit-to-stand/stand-to-sit;bed-to-chair/chair-to-bed;toilet  -BC     [place] Level/Cues Needed (Transfer Goal 1, OT) supervision required  -BC     Time Frame (Transfer Goal 1, OT) short term goal (STG);2 weeks  -BC     Progress/Outcome (Transfer Goal 1, OT) new goal  -BC       Row Name 07/18/25 1544          Dressing Goal 1 (OT)    Activity/Device (Dressing Goal 1, OT) upper body dressing;lower body dressing  -BC     [place]/Cues Needed (Dressing Goal 1, OT) supervision required  -BC     Time Frame (Dressing Goal 1, OT) short term goal (STG);2 weeks  -BC     Progress/Outcome (Dressing Goal 1, OT) new goal  -BC       Row Name 07/18/25 1544          Toileting Goal 1 (OT)    Activity/Device (Toileting Goal 1, OT) toileting skills, all;adjust/manage clothing;perform perineal hygiene  -BC     [place] Level/Cues Needed (Toileting Goal 1, OT) supervision required  -BC     Time Frame (Toileting Goal 1, OT) short term goal (STG);2 weeks  -BC     Progress/Outcome (Toileting Goal 1, OT) new goal  -BC       Row Name 07/18/25 1544          Grooming Goal 1 (OT)    Activity/Device (Grooming Goal 1, OT) grooming skills, all  -BC     [place] (Grooming Goal 1, OT) supervision required  -BC     Time Frame (Grooming Goal 1, OT) 2 weeks;short term goal (STG)  -BC     Strategies/Barriers (Grooming Goal 1, OT) standing sinkside for ADL IND  -BC     Progress/Outcome (Grooming Goal 1, OT) new goal  -BC       Row Name 07/18/25 1544          Problem Specific Goal 1 (OT)    Problem Specific Goal 1 (OT) Pt will increase ADL IND for 3 step ADL in room w/ walker and close sup A.  -BC     Time Frame (Problem Specific Goal 1, OT) 2 weeks;short term goal (STG)   -BC     Progress/Outcome (Problem Specific Goal 1, OT) new goal  -BC       Row Name 07/18/25 1544          Therapy Assessment/Plan (OT)    Planned Therapy Interventions (OT) activity tolerance training;BADL retraining;cognitive/visual perception retraining;functional balance retraining;neuromuscular control/coordination retraining;occupation/activity based interventions;patient/caregiver education/training;strengthening exercise;transfer/mobility retraining;passive ROM/stretching;ROM/therapeutic exercise  -BC               User Key  (r) = Recorded By, (t) = Taken By, (c) = Cosigned By      Initials Name Provider Type    BC Ria Tang, LOUISE Occupational Therapist                   Clinical Impression       Row Name 07/18/25 1535          Pain Assessment    Pretreatment Pain Rating 0/10 - no pain  -BC     Posttreatment Pain Rating 0/10 - no pain  -BC       Row Name 07/18/25 1539          Plan of Care Review    Plan of Care Reviewed With patient  -BC     Progress improving  -BC     Outcome Evaluation Pt is a 89 year old female admitted to [facility] 7/17 with a fall at home, hypothermia, CHF, and malnutrition. Pts past medical history including but not limited too: HTN, polio, afib with resdual RUE impairments from polio. Pt is a new resident to [facility] to which she reports she is (I) at baseline with RW for ADLs and in room mobility, reports she does not use oxygen normally at home. She verb'd feeling near her baseline but had increased difficulty coming to stand today and requiring increased time, cues, and CGA to min A. Pt also w/ difficulty standing up from bathroom commode, verb'd her grab bars are on the opposite side as set up here. Pt cont'd following ADLs in the room around the unit w/ the walker and close SBA/CGA. Hopeful with continued therapy while IP she will regain function and be safe to return back to [facility] with recommending continued therapy the next level of care. Pt was somewhat receptive to education.  -BC        Row Name 07/18/25 1535          Therapy Assessment/Plan (OT)    Rehab Potential (OT) good  -BC     Criteria for Skilled Therapeutic Interventions Met (OT) yes;meets criteria  -BC     Therapy Frequency (OT) 5 times/wk  -BC       Row Name 07/18/25 1535          Vital Signs    O2 Delivery Pre Treatment supplemental O2  -BC     O2 Delivery Intra Treatment room air  poor pleth waveform unable to get accurate reading therefore reapplied NC (2L)  -BC     O2 Delivery Post Treatment supplemental O2  -BC     Pre Patient Position Supine  -BC     Intra Patient Position Standing  -BC     Post Patient Position Supine  -BC       Row Name 07/18/25 1535          Positioning and Restraints    Pre-Treatment Position in bed  -BC     Post Treatment Position bed  -BC     In Bed notified nsg;supine;fowlers;call light within reach;encouraged to call for assist;exit alarm on;patient within staff view  -BC               User Key  (r) = Recorded By, (t) = Taken By, (c) = Cosigned By      Initials Name Provider Type    Ria Jordan, OT Occupational Therapist                   Outcome Measures       Row Name 07/18/25 1544          How much help from another is currently needed...    Putting on and taking off regular lower body clothing? 3  -BC     Bathing (including washing, rinsing, and drying) 2  -BC     Toileting (which includes using toilet bed pan or urinal) 3  -BC     Putting on and taking off regular upper body clothing 3  -BC     Taking care of personal grooming (such as brushing teeth) 3  -BC     Eating meals 3  -BC     AM-PAC 6 Clicks Score (OT) 17  -BC       Row Name 07/18/25 1131 07/18/25 0809       How much help from another person do you currently need...    Turning from your back to your side while in flat bed without using bedrails? 4  -ER 4  -MS    Moving from lying on back to sitting on the side of a flat bed without bedrails? 3  -ER 3  -MS    Moving to and from a bed to a chair (including a wheelchair)? 3  -ER 3   -MS    Standing up from a chair using your arms (e.g., wheelchair, bedside chair)? 3  -ER 2  -MS    Climbing 3-5 steps with a railing? 2  -ER 2  -MS    To walk in hospital room? 2  -ER 2  -MS    AM-PAC 6 Clicks Score (PT) 17  -ER 16  -MS    Highest Level of Mobility Goal Stand (1 or More Minutes)-5  -ER Stand (1 or More Minutes)-5  -MS      Row Name 07/18/25 1544          Modified [place] Scale    Modified [place]alfa Scale 2 - Slight disability.  Unable to carry out all previous activities but able to look after own affairs without assistance.  -BC       Row Name 07/18/25 1544 07/18/25 1131       Functional Assessment    Outcome Measure Options AM-PAC 6 Clicks Daily Activity (OT);Modified Luli  -BC AM-PAC 6 Clicks Basic Mobility (PT)  -ER              User Key  (r) = Recorded By, (t) = Taken By, (c) = Cosigned By      Initials Name Provider Type    Patti Matthew, RN Registered Nurse    ER Yaima Covarrubias, PT Physical Therapist    BC Ria Tang, OT Occupational Therapist                    Occupational Therapy Education       Title: PT OT SLP Therapies (In Progress)       Topic: Occupational Therapy (In Progress)       Point: ADL training (Done)       Learning Progress Summary            Patient Acceptance, E, VU by BC at 7/18/2025 1544    Comment: role of OT at acute level and POC, recommending cont'd therapy at [facility], walker safety/hand placement                                      User Key       Initials Effective Dates Name Provider Type Discipline    BC 07/29/24 -  Ria Tang, OT Occupational Therapist OT                  OT Recommendation and Plan  Planned Therapy Interventions (OT): activity tolerance training, BADL retraining, cognitive/visual perception retraining, functional balance retraining, neuromuscular control/coordination retraining, occupation/activity based interventions, patient/caregiver education/training, strengthening exercise, transfer/mobility retraining, passive ROM/stretching,  ROM/therapeutic exercise  Therapy Frequency (OT): 5 times/wk  Plan of Care Review  Plan of Care Reviewed With: patient  Progress: improving  Outcome Evaluation: Pt is a 89 year old female admitted to [facility] 7/17 with a fall at home, hypothermia, CHF, and malnutrition. Pts past medical history including but not limited too: HTN, polio, afib with resdual RUE impairments from polio. Pt is a new resident to [facility] to which she reports she is (I) at baseline with RW for ADLs and in room mobility, reports she does not use oxygen normally at home. She verb'd feeling near her baseline but had increased difficulty coming to stand today and requiring increased time, cues, and CGA to min A. Pt also w/ difficulty standing up from bathroom commode, verb'd her grab bars are on the opposite side as set up here. Pt cont'd following ADLs in the room around the unit w/ the walker and close SBA/CGA. Hopeful with continued therapy while IP she will regain function and be safe to return back to [facility] with recommending continued therapy the next level of care. Pt was somewhat receptive to education.     Time Calculation:   Evaluation Complexity (OT)  Review Occupational Profile/Medical/Therapy History Complexity: expanded/moderate complexity  Assessment, Occupational Performance/Identification of Deficit Complexity: 3-5 performance deficits  Clinical Decision Making Complexity (OT): detailed assessment/moderate complexity  Overall Complexity of Evaluation (OT): moderate complexity     Time Calculation- OT       Row Name 07/18/25 1545             Time Calculation- OT    OT Start Time 1453  -BC      OT Stop Time 1520  -BC      OT Time Calculation (min) 27 min  -BC      Total Timed Code Minutes- OT 14 minute(s)  -BC      OT Received On 07/18/25  -BC      OT - Next Appointment 07/21/25  -BC      OT Goal Re-Cert Due Date 08/01/25  -BC         Timed Charges    01560 - OT Self Care/Mgmt Minutes 14  -BC         Total Minutes    Timed Charges Total  Minutes 14  -BC       Total Minutes 14  -BC                User Key  (r) = Recorded By, (t) = Taken By, (c) = Cosigned By      Initials Name Provider Type    BC Ria Tang OT Occupational Therapist                  Therapy Charges for Today       Code Description Service Date Service Provider Modifiers Qty    [id number]  OT SELF CARE/MGMT/TRAIN EA 15 MIN 7/18/2025 Ria Tang OT GO 1    [id number]  OT EVAL MOD COMPLEXITY 3 7/18/2025 Ria Tang OT GO 1                 Ria Tang OT  7/18/2025

## 2025-07-18 NOTE — CONSULTS
Electrophysiology Hospital Consult            Patient Name: Inge Borjas  Age/Sex: 89 y.o. female  : 1932  MRN: [id number]    Date of Admission: 2025  Date of Encounter Visit: 25  Encounter Provider: ZEESHAN Cano  Referring Provider: Liam Bynum MD  Place of Service: [facility] CARDIOLOGY  Patient Care Team:  Angelica Anthony MD as PCP - General  Angelica Anthony MD as PCP - Family Medicine  Inge Jack MD as Referring Physician (General Surgery)  Len Escobar MD as Consulting Physician (Hematology and Oncology)      Subjective:   EP Consultation for: AF with slow ventricular rate     Chief Complaint: mechanical fall     History of Present Illness:  Inge Borjas is a 89 y.o. female who follows with Dr. Bloom. She has chronic atrial fibrillation, colon cancer, HTN, valvular heart disease.     She has chronic atrial fibrillation rate controlled with atenolol.     She was seen in 2025 with dyspnea. Echo showed elevated RSVP. Heart cath showed increased pulmoanary pressures.     Mitraclip was discussed but deferred.     She saw Nataliia in May. She was still having dyspnea on exertion. Her lasix was increased.         She presented  with complaints of a mechanical fall.     She was in A fib. On telemetry noted to have a period of bradycardia into the 30s. Possible had some decreased alertness at that time.     I saw the patient today.     She denies syncope associated with her fall.     She says she bent over to reach for something and tripped.     She denies any dizziness, near syncope, or syncope.     On telemetry yesterday she had about 6 seconds of bradycardia and slow ventricular response. She does not remember any symptoms at that time.     She has not had any further episodes of bradycardia.     Her atenolol is being held.     Past Medical History:  Past Medical History:   Diagnosis Date    Anemia     Arthritis      Atrial fibrillation     Intermittently    Colon cancer 2012    Stage III    Disease of thyroid gland     hypothyroid    Drug therapy 2012    pill for 6 months    H/O Herpes simplex     virus of the lip    Hyperlipidemia     Hypertension     Intervertebral disk disease     Polio     Trigeminal neuralgia        Past Surgical History:   Procedure Laterality Date    ARTHROSCOPY SHOULDER W/ OPEN ROTATOR CUFF REPAIR      BREAST BIOPSY Left     at least 25 years ago.    CARDIAC CATHETERIZATION  3/25/2025    Procedure: Right and Left Heart Cath;  Surgeon: Mukul Bloom MD;  Location:  [facility];  Service: Cardiology;;    CARDIAC CATHETERIZATION N/A 3/25/2025    Procedure: Coronary angiography;  Surgeon: Mukul Bloom MD;  Location:  [facility];  Service: Cardiology;  Laterality: N/A;    COLON RESECTION  08/2015    JOINT REPLACEMENT  1947    Elbow transplant and shoulder fusion    TONSILLECTOMY         Home Medications:   Medications Prior to Admission   Medication Sig Dispense Refill Last Dose/Taking    atenolol (TENORMIN) 100 MG tablet Take 1 tablet (100 mg total) by mouth 2 (Two) Times a Day 240 tablet 1 7/16/2025 Evening    carBAMazepine XR (TEGretol  XR) 100 MG 12 hr tablet Take 1 tablet by mouth 2 (Two) Times a Day. 180 tablet 4 7/16/2025 Evening    doxazosin (CARDURA) 4 MG tablet Take 1 tablet by mouth every night at bedtime. 90 tablet 4 7/16/2025 Bedtime    furosemide (LASIX) 20 MG tablet Take 1 tablet by mouth Daily.   7/16/2025 Morning    levothyroxine (SYNTHROID, LEVOTHROID) 75 MCG tablet Take 1 tablet by mouth Every Morning.   7/16/2025 Morning    spironolactone (ALDACTONE) 25 MG tablet Take 1 tablet by mouth Daily.   7/16/2025 Evening    traZODone (DESYREL) 50 MG tablet Take 1 tablet by mouth Every Night.   7/16/2025 Evening    acetaminophen (TYLENOL) 325 MG tablet Take 2 tablets by mouth Every 6 (Six) Hours As Needed for Mild Pain, Moderate Pain, Headache  or Fever.       amLODIPine (NORVASC) 5 MG tablet Take 1 tablet by mouth Daily.       aspirin 81 MG EC tablet Take 1 tablet by mouth Daily.       brimonidine (ALPHAGAN) 0.2 % ophthalmic solution Administer 1 drop to both eyes 2 (Two) Times a Day.       calcium carbonate-vitamin d 600-400 MG-UNIT per tablet Take 1 tablet by mouth Daily.       cyclobenzaprine (FLEXERIL) 5 MG tablet Take 1 tablet by mouth 3 (Three) Times a Day As Needed for Muscle Spasms.       dorzolamide (TRUSOPT) 2 % ophthalmic solution 1 drop 3 (Three) Times a Day.       fluticasone (FLONASE) 50 MCG/ACT nasal spray Administer 2 sprays into the nostril(s) as directed by provider Daily.       Multiple Vitamins-Minerals (CENTRUM ADULTS PO) Take  by mouth.          Allergies:  Allergies   Allergen Reactions    Cardizem [Diltiazem Hcl] Unknown - Low Severity    Amoxicillin Rash       Past Social History:  Social History     Socioeconomic History    Marital status:      Spouse name: Burt   Tobacco Use    Smoking status: Never     Passive exposure: Never    Smokeless tobacco: Never   Vaping Use    Vaping status: Never Used   Substance and Sexual Activity    Alcohol use: No    Drug use: No    Sexual activity: Defer     Partners: Male       Past Family History:  Family History   Problem Relation Age of Onset    Colon cancer Father         Late in life    Heart disease Father     No Known Problems Mother        Review of Systems: All systems reviewed. Pertinent positives identified in HPI. All other systems are negative.     14 point ROS was performed and is negative except as outlined in HPI.     Objective:     Objective:  Vital Signs (last 24 hours)         07/17 0700  07/18 0659 07/18 0700 07/18 0929   Most Recent      Temp (°F) 94.2 -  97.7       97.3 (36.3) 07/17 2312    Heart Rate 52 -  85      79     79 07/18 0716    Resp 18 -  20      18     18 07/18 0716    /82 -  177/99      163/103     163/103  Comment: Rn notified 07/18 0716     SpO2 (%) 89 -  100      100     100 07/18 0716    Flow (L/min) (Oxygen Therapy)   2      2     2 07/18 0809          Temp:  [96.6 °F (35.9 °C)-97.7 °F (36.5 °C)] 97.3 °F (36.3 °C)  Heart Rate:  [52-85] 79  Resp:  [18-20] 18  BP: (130-163)/() 163/103  Body mass index is 20.12 kg/m².        Physical Exam:     General Appearance: No acute distress, well developed and well nourished.   Respiratory: No signs of respiratory distress. Respiration rhythm and depth normal.   Cardiovascular:  Heart Rate and Rhythm: Normal, Heart Sounds: Normal S1 and S2. No S3 or S4 noted  Skin: Warm and dry.   Psychiatric: Patient alert and oriented to person, place, and time. Speech and behavior appropriate. Normal mood and affect.    Labs:   Lab Review:     Results from last 7 days   Lab Units 07/18/25  0412 07/17/25  0640   SODIUM mmol/L 135* 134*   POTASSIUM mmol/L 4.8 5.0   CHLORIDE mmol/L 104 99   CO2 mmol/L 22.1 26.0   BUN mg/dL 38.0* 40.0*   CREATININE mg/dL 1.25* 1.44*   GLUCOSE mg/dL 79 110*   CALCIUM mg/dL 8.9 9.3   AST (SGOT) U/L  --  32   ALT (SGPT) U/L  --  24         Results from last 7 days   Lab Units 07/18/25  0412   WBC 10*3/mm3 6.08   HEMOGLOBIN g/dL 10.9*   HEMATOCRIT % 35.6   PLATELETS 10*3/mm3 101*             Results from last 7 days   Lab Units 07/18/25  0412   MAGNESIUM mg/dL 2.6*                 Results from last 7 days   Lab Units 07/17/25  0640   TSH uIU/mL 6.120*     EKG:         I personally viewed and interpreted the patient's EKG/Telemetry tracings.    Assessment:       Hypothermia    Nonrheumatic tricuspid valve regurgitation    Essential hypertension    Hypothyroidism (acquired)    Chronic kidney failure, stage 3 (moderate)    Moderate protein-calorie malnutrition    Chronic diastolic CHF (congestive heart failure)    Chronic a-fib        Plan:   She has chronic a fib and noted to have periods of bradycardia. After discussion I do not think this is the cause of her syncope.     She has not had any  further bradycardia since yesterday and she does not appreciate any symptoms.     She was on a high dose of atenolol and her HR is better after holding.     We discussed and no indication for pacemaker at this time.     I would resume atenolol at 25mg daily and can increase as needed for her A fib.     EP will see as needed. She can follow up in clinic at discharge.       Thank you for allowing me to participate in the care of Inge Borjas. Feel free to contact me directly with any further questions or concerns.    ZEESHAN Cano  [facility]  07/18/25  09:39 EDT

## 2025-07-18 NOTE — PROGRESS NOTES
"Patient Care Team:  Angelica Anthony MD as PCP - General  Angelica Anthony MD as PCP - Family Medicine  Inge Jack MD as Referring Physician (General Surgery)  Len Escobar MD as Consulting Physician (Hematology and Oncology)    Chief Complaint: Atrial fibrillation with a slow ventricular rate.      Interval History:   Heart rate has improved.  Temperature is better.  She states that she feels better today.    Objective   Vital Signs  Temp:  [97.3 °F (36.3 °C)-97.7 °F (36.5 °C)] 97.3 °F (36.3 °C)  Heart Rate:  [79-85] 85  Resp:  [18] 18  BP: (127-163)/() 127/72  No intake or output data in the 24 hours ending 07/18/25 1423  Flowsheet Rows      Flowsheet Row First Filed Value   Admission Height 152.4 cm (60\") Documented at 07/17/2025 1150   Admission Weight 46.7 kg (103 lb) Documented at 07/17/2025 0618            Temp:  [97.3 °F (36.3 °C)-97.7 °F (36.5 °C)] 97.3 °F (36.3 °C)  Heart Rate:  [79-85] 85  Resp:  [18] 18  BP: (127-163)/() 127/72  No intake or output data in the 24 hours ending 07/18/25 1423  Flowsheet Rows      Flowsheet Row First Filed Value   Admission Height 152.4 cm (60\") Documented at 07/17/2025 1150   Admission Weight 46.7 kg (103 lb) Documented at 07/17/2025 0618            General Appearance:    Alert, cooperative, in no acute distress   Head:    Normocephalic, without obvious abnormality, atraumatic       Neck/Lymph   No adenopathy, supple, no thyromegaly, no carotid bruit, no    JVD   Lungs:     Clear to auscultation bilaterally, no wheezes, rales, or     rhonchi    Cardiac:    Normal rate, irregularly irregular rhythm, 3 out of 6 systolic murmur, no rub, no gallop   Chest Wall:    No abnormalities observed   GI:     Normal bowel sounds, soft, nontender, nondistended,            no rebound tenderness   Extremities:   No cyanosis, clubbing, or edema   Circulatory/Peripheral Vascular :   Pulses palpable and equal bilaterally   Integumentary:   No bleeding or rash. " Normal temperature                amLODIPine, 5 mg, Oral, Daily  aspirin, 81 mg, Oral, Nightly  brimonidine, 1 drop, Both Eyes, BID  carBAMazepine XR, 100 mg, Oral, BID  dorzolamide, 1 drop, Both Eyes, TID  fluticasone, 2 spray, Nasal, Daily  furosemide, 20 mg, Oral, Daily  levothyroxine, 75 mcg, Oral, Q AM  sodium chloride, 10 mL, Intravenous, Q12H  spironolactone, 25 mg, Oral, Daily  terazosin, 5 mg, Oral, Nightly  traZODone, 50 mg, Oral, Nightly             Results Review:    Results from last 7 days   Lab Units 07/18/25  0412   SODIUM mmol/L 135*   POTASSIUM mmol/L 4.8   CHLORIDE mmol/L 104   CO2 mmol/L 22.1   BUN mg/dL 38.0*   CREATININE mg/dL 1.25*   GLUCOSE mg/dL 79   CALCIUM mg/dL 8.9         Results from last 7 days   Lab Units 07/18/25  0412   WBC 10*3/mm3 6.08   HEMOGLOBIN g/dL 10.9*   HEMATOCRIT % 35.6   PLATELETS 10*3/mm3 101*             Results from last 7 days   Lab Units 07/18/25  0412   MAGNESIUM mg/dL 2.6*         @LABRCNT(bnp)@  I reviewed the patient's new clinical results.  I personally viewed and interpreted the patient's EKG/Telemetry data            Assessment & Plan   1.  Fall/frailty  2.  Chronic atrial fibrillation: Bradycardic in the emergency room.  In the setting of hypothermia and high-dose atenolol therapy.  3.  Heart failure with preserved ejection fraction  4.  Essential hypertension  5.  Severe mitral valve regurgitation: Patient unfortunately based on anatomy, age and overall condition is not a candidate for MitraClip.    - Medication has been restarted including amlodipine 5 mg p.o. daily, Farmer and to take the place of patient's Cardura, and low-dose  - Agree with restart low-dose atenolol.  This can be started today.  25 mg daily.  - We will check on tomorrow.  No additional cardiac workup indicated right now.

## 2025-07-18 NOTE — PLAN OF CARE
Goal Outcome Evaluation:  Plan of Care Reviewed With: patient        Progress: improving     Pt is alert and oriented.  VSS.  Plan is to d/c tomorrow.  Meds slowly being restarted.  Oral lasix given this afternoon.  Up to chair for lunch.  Will continue to monitor patient.         Problem: Adult Inpatient Plan of Care  Goal: Plan of Care Review  Outcome: Progressing  Flowsheets (Taken 7/18/2025 1451)  Progress: improving  Plan of Care Reviewed With: patient  Goal: Patient-Specific Goal (Individualized)  Outcome: Progressing  Goal: Absence of Hospital-Acquired Illness or Injury  Outcome: Progressing  Intervention: Identify and Manage Fall Risk  Recent Flowsheet Documentation  Taken 7/18/2025 1424 by Patti Brower, RN  Safety Promotion/Fall Prevention:   assistive device/personal items within reach   clutter free environment maintained   fall prevention program maintained   nonskid shoes/slippers when out of bed   room organization consistent   safety round/check completed  Taken 7/18/2025 1202 by Patti Brower, RN  Safety Promotion/Fall Prevention:   assistive device/personal items within reach   clutter free environment maintained   fall prevention program maintained   nonskid shoes/slippers when out of bed   room organization consistent   safety round/check completed  Taken 7/18/2025 0958 by Patti Brower, RN  Safety Promotion/Fall Prevention:   assistive device/personal items within reach   clutter free environment maintained   fall prevention program maintained   nonskid shoes/slippers when out of bed   room organization consistent   safety round/check completed  Taken 7/18/2025 0809 by Patti Brower, RN  Safety Promotion/Fall Prevention:   assistive device/personal items within reach   clutter free environment maintained   fall prevention program maintained   nonskid shoes/slippers when out of bed   room organization consistent   safety round/check completed  Intervention: Prevent Skin  Injury  Recent Flowsheet Documentation  Taken 7/18/2025 1424 by Patti Brower RN  Body Position: supine  Taken 7/18/2025 1202 by Patti Brower RN  Body Position:   side-lying   left   position changed independently  Taken 7/18/2025 0958 by Patti Brower RN  Body Position:   left   position changed independently   side-lying  Taken 7/18/2025 0809 by Patti Brower RN  Body Position: sitting up in bed  Goal: Optimal Comfort and Wellbeing  Outcome: Progressing  Goal: Readiness for Transition of Care  Outcome: Progressing     Problem: Skin Injury Risk Increased  Goal: Skin Health and Integrity  Outcome: Progressing  Intervention: Optimize Skin Protection  Recent Flowsheet Documentation  Taken 7/18/2025 1424 by Patti Brower RN  Activity Management: back to bed  Head of Bed (HOB) Positioning: HOB elevated  Taken 7/18/2025 1202 by Patti Brower RN  Head of Bed (HOB) Positioning: HOB lowered  Taken 7/18/2025 0958 by Patti Brower RN  Head of Bed (HOB) Positioning: HOB elevated  Taken 7/18/2025 0809 by Patti Brower RN  Head of Bed (HOB) Positioning: HOB at 30-45 degrees     Problem: Fall Injury Risk  Goal: Absence of Fall and Fall-Related Injury  Outcome: Progressing  Intervention: Promote Injury-Free Environment  Recent Flowsheet Documentation  Taken 7/18/2025 1424 by Patti Brower RN  Safety Promotion/Fall Prevention:   assistive device/personal items within reach   clutter free environment maintained   fall prevention program maintained   nonskid shoes/slippers when out of bed   room organization consistent   safety round/check completed  Taken 7/18/2025 1202 by Patti Brower RN  Safety Promotion/Fall Prevention:   assistive device/personal items within reach   clutter free environment maintained   fall prevention program maintained   nonskid shoes/slippers when out of bed   room organization consistent   safety round/check completed  Taken 7/18/2025 0958 by Leonarda  Patti HODGES, RN  Safety Promotion/Fall Prevention:   assistive device/personal items within reach   clutter free environment maintained   fall prevention program maintained   nonskid shoes/slippers when out of bed   room organization consistent   safety round/check completed  Taken 7/18/2025 0809 by Patti Brower, RN  Safety Promotion/Fall Prevention:   assistive device/personal items within reach   clutter free environment maintained   fall prevention program maintained   nonskid shoes/slippers when out of bed   room organization consistent   safety round/check completed     Problem: Targeted Temperature Management  Goal: Optimal Response to Life-Threatening Event  Outcome: Progressing  Goal: Target Body Temperature Maintained  Outcome: Progressing  Goal: Stable Cardiac Rate and Rhythm  Outcome: Progressing  Goal: Effective Tissue Perfusion  Outcome: Progressing  Goal: Absence of Infection Signs and Symptoms  Outcome: Progressing

## 2025-07-18 NOTE — DISCHARGE PLACEMENT REQUEST
"Inge Borjas (89 y.o. Female)       Date of Birth   08/06/1932    Social Security Number       Address   [address] [place] [address] [place] [address]    Home Phone   [phone]    MRN   [id number]       [facility]    Marital Status                               Admission Date   7/17/2025    Admission Type   Emergency    Admitting Provider   Liam Bynum MD    Attending Provider   Liam Bynum MD    Department, Room/Bed   [address], [address]/1       Discharge Date       Discharge Disposition       Discharge Destination                                 Attending Provider: Liam Bynum MD    Allergies: Cardizem [Diltiazem Hcl], Amoxicillin    Isolation: None   Infection: None   Code Status: No CPR    Ht: 152.4 cm (60\")   Wt: 46.7 kg (103 lb)    Admission Cmt: None   Principal Problem: Hypothermia [T68.XXXA]                   Active Insurance as of 7/17/2025       Primary Coverage       Payor Plan Insurance Group Employer/Plan Group    MEDICARE MEDICARE A & B        Payor Plan Address Payor Plan Phone Number Payor Plan Fax Number Effective Dates    [address] [phone]  10/1/1999 - None Entered    [facility] [id number]         Subscriber Name Subscriber Birth Date Member ID       INGE BORJAS 8/6/1932 [id number]               Secondary Coverage       Payor Plan Insurance Group Employer/Plan Group    PHYSICIANS [place] [employer]AN F       Payor Plan Address Payor Plan Phone Number Payor Plan Fax Number Effective Dates    ATTN CLAIMS DEPT [phone]  6/1/2002 - None Entered    [address] [place] [id number]         Subscriber Name Subscriber Birth Date Member ID       INGE BORJAS 8/6/1932 [id number]                     Emergency Contacts        (Rel.) Home Phone Work Phone Mobile Phone    Precious Paula ([facility]) (Friend) -- -- [phone]                "

## 2025-07-18 NOTE — PROGRESS NOTES
Name: Inge Borjas ADMIT: 2025   : 1932  PCP: Angelica Anthony MD    MRN: [id number] LOS: 0 days   AGE/SEX: 89 y.o. female  ROOM: [facility]   Subjective   Chief Complaint   Patient presents with    Fall    Temp is better  BP is up  Feels better  Wants to work with therapy    ROS  No f/c  No n/v  No cp/palp  No cough  Chronic dyspnea     Objective   Vital Signs  Temp:  [97.3 °F (36.3 °C)-97.7 °F (36.5 °C)] 97.3 °F (36.3 °C)  Heart Rate:  [79-85] 79  Resp:  [18] 18  BP: (161-163)/() 163/103  SpO2:  [93 %-100 %] 100 %  on  Flow (L/min) (Oxygen Therapy):  [2] 2;   Device (Oxygen Therapy): nasal cannula  Body mass index is 20.12 kg/m².    Physical Exam  HENT:      Head: Normocephalic and atraumatic.   Eyes:      General: No scleral icterus.  Cardiovascular:      Rate and Rhythm: Normal rate.      Heart sounds: Murmur heard.   Pulmonary:      Effort: Pulmonary effort is normal. No respiratory distress.   Abdominal:      General: There is no distension.      Palpations: Abdomen is soft.   Musculoskeletal:      Cervical back: Neck supple.   Neurological:      Mental Status: She is alert.   Psychiatric:         Behavior: Behavior normal.     Elderly  Chronically ill     Results Review:       I reviewed the patient's new clinical results.  Results from last 7 days   Lab Units 25  0412 25  0640   WBC 10*3/mm3 6.08 5.58   HEMOGLOBIN g/dL 10.9* 11.1*   PLATELETS 10*3/mm3 101* 107*     Results from last 7 days   Lab Units 25  0412 25  0640   SODIUM mmol/L 135* 134*   POTASSIUM mmol/L 4.8 5.0   CHLORIDE mmol/L 104 99   CO2 mmol/L 22.1 26.0   BUN mg/dL 38.0* 40.0*   CREATININE mg/dL 1.25* 1.44*   GLUCOSE mg/dL 79 110*   Estimated Creatinine Clearance: 21.2 mL/min (A) (by C-G formula based on SCr of 1.25 mg/dL (H)).  Results from last 7 days   Lab Units 25  0640   ALBUMIN g/dL 4.1   BILIRUBIN mg/dL 0.6   ALK PHOS U/L 97   AST (SGOT) U/L 32   ALT (SGPT) U/L 24     Results from last 7  "days   Lab Units 07/18/25  0412 07/17/25  0640   CALCIUM mg/dL 8.9 9.3   ALBUMIN g/dL  --  4.1   MAGNESIUM mg/dL 2.6*  --    PHOSPHORUS mg/dL 2.9  --      Results from last 7 days   Lab Units 07/17/25  0640   LACTATE mmol/L 1.1       Coag     HbA1C No results found for: \"HGBA1C\"  Infection   Results from last 7 days   Lab Units 07/17/25  0654   BLOODCX  No growth at 24 hours  No growth at 24 hours     Radiology(recent) XR Chest 1 View  Result Date: 7/17/2025  As described.  This report was finalized on 7/17/2025 8:04 AM by Dr. Esa Corea M.D on Workstation: [id number]      XR Hip With or Without Pelvis 2 - 3 View Right  Result Date: 7/17/2025  As described.  This report was finalized on 7/17/2025 8:04 AM by Dr. Esa Corea M.D on Workstation: [id number]      XR Spine Lumbar 2 or 3 View  Result Date: 7/17/2025  As described.  This report was finalized on 7/17/2025 8:04 AM by Dr. Esa Corea M.D on Workstation: [id number]      No results found for: \"TROPONINT\", \"TROPONINI\", \"BNP\"  No components found for: \"TSH;2\"    amLODIPine, 5 mg, Oral, Daily  aspirin, 81 mg, Oral, Nightly  brimonidine, 1 drop, Both Eyes, BID  carBAMazepine XR, 100 mg, Oral, BID  dorzolamide, 1 drop, Both Eyes, TID  fluticasone, 2 spray, Nasal, Daily  levothyroxine, 75 mcg, Oral, Q AM  sodium chloride, 10 mL, Intravenous, Q12H  spironolactone, 25 mg, Oral, Daily  traZODone, 50 mg, Oral, Nightly       Diet: Cardiac; Healthy Heart (2-3 Na+); Fluid Consistency: Thin (IDDSI 0)      Assessment & Plan      Active Hospital Problems    Diagnosis  POA    **Hypothermia [T68.XXXA]  Yes    Chronic diastolic CHF (congestive heart failure) [I50.32]  Yes    Chronic a-fib [I48.20]  Yes    Moderate protein-calorie malnutrition [E44.0]  Yes    Essential hypertension [I10]  Yes    Hypothyroidism (acquired) [E03.9]  Yes    Chronic kidney failure, stage 3 (moderate) [N18.30]  Yes    Nonrheumatic tricuspid valve regurgitation [I36.1]  Yes      Resolved " Hospital Problems   No resolved problems to display.       Ms. Borjas is a 89 y.o. with a history of chronic afib, pulmonary hypertension, mitral and tricuspid regurg, recent admission for URI and recent death of her . She came to [facility] ER today after a mechanical fall. She was hypotensive with EMS (and hypertensive in the ER). She had hypothermia in the ER and was given warming blanket and bear hugger. Some bradycardia at times in the ER. She overall says she feels ok. Chronic dyspnea with exertion but denies any cough, chest pain, abdominal pain, dysuria. She says she's a little warm now after the bear huger.      Temperature improved. No evidence of infection by workup or symptoms. TSH was ok, free T4 normal, Cortisol normal  Cardiology consulted by ER for afib with low HR. Likely related to to her hypothermia, BB held and now restarted by Cardiology at lower dose  Restart aldactone with BP elevated. Monitor BP and need for adjustements can restart her low dose lasix as well and her qhs alpha blocker.   I discussed the patients findings and my recommendations with patient and nursing staff.     VTE Prophylaxis - SCDs.  Code Status - DNR/DNI- confirmed with patient at bedside. She would be ok with most medical treatment but no intubation, no CPR.       Dispo- back to [place] vs need for rehab based on how she does with therapy.       Liam Bynum MD  [facility]  07/18/25  13:26 EDT

## 2025-07-19 VITALS
DIASTOLIC BLOOD PRESSURE: 76 MMHG | HEART RATE: 78 BPM | TEMPERATURE: 97.3 F | BODY MASS INDEX: 20.22 KG/M2 | OXYGEN SATURATION: 87 % | HEIGHT: 60 IN | RESPIRATION RATE: 18 BRPM | WEIGHT: 103 LBS | SYSTOLIC BLOOD PRESSURE: 148 MMHG

## 2025-07-19 RX ORDER — ATENOLOL 25 MG/1
25 TABLET ORAL
Qty: 30 TABLET | Refills: 0 | Status: SHIPPED | OUTPATIENT
Start: 2025-07-20

## 2025-07-19 RX ADMIN — BRIMONIDINE TARTRATE 1 DROP: 2 SOLUTION/ DROPS OPHTHALMIC at 08:33

## 2025-07-19 RX ADMIN — Medication 10 ML: at 08:34

## 2025-07-19 RX ADMIN — FUROSEMIDE 20 MG: 20 TABLET ORAL at 08:33

## 2025-07-19 RX ADMIN — FLUTICASONE PROPIONATE 2 SPRAY: 50 SPRAY, METERED NASAL at 08:33

## 2025-07-19 RX ADMIN — CARBAMAZEPINE 100 MG: 100 TABLET, EXTENDED RELEASE ORAL at 08:33

## 2025-07-19 RX ADMIN — ATENOLOL 25 MG: 25 TABLET ORAL at 08:33

## 2025-07-19 RX ADMIN — LEVOTHYROXINE SODIUM 75 MCG: 0.07 TABLET ORAL at 06:22

## 2025-07-19 RX ADMIN — SPIRONOLACTONE 25 MG: 25 TABLET ORAL at 08:33

## 2025-07-19 RX ADMIN — AMLODIPINE BESYLATE 5 MG: 5 TABLET ORAL at 08:33

## 2025-07-19 RX ADMIN — DORZOLAMIDE HYDROCHLORIDE 1 DROP: 20 SOLUTION/ DROPS OPHTHALMIC at 08:33

## 2025-07-19 NOTE — DISCHARGE SUMMARY
NAME: Inge Borjas ADMIT: 2025   : 1932  PCP: Angelica Anthony MD    MRN: [id number] LOS: 1 days   AGE/SEX: 89 y.o. female  ROOM: [facility]     Date of Admission:  2025  Date of Discharge:  2025    PCP: Angelica Anthony MD    CHIEF COMPLAINT  Fall      DISCHARGE DIAGNOSIS  Active Hospital Problems    Diagnosis  POA    **Hypothermia [T68.XXXA]  Yes    Chronic diastolic CHF (congestive heart failure) [I50.32]  Yes    Chronic a-fib [I48.20]  Yes    Moderate protein-calorie malnutrition [E44.0]  Yes    Essential hypertension [I10]  Yes    Hypothyroidism (acquired) [E03.9]  Yes    Chronic kidney failure, stage 3 (moderate) [N18.30]  Yes    Nonrheumatic tricuspid valve regurgitation [I36.1]  Yes      Resolved Hospital Problems   No resolved problems to display.       SECONDARY DIAGNOSES  Past Medical History:   Diagnosis Date    Anemia     Arthritis     Atrial fibrillation     Intermittently    Colon cancer     Stage III    Disease of thyroid gland     hypothyroid    Drug therapy     pill for 6 months    H/O Herpes simplex     virus of the lip    Hyperlipidemia     Hypertension     Intervertebral disk disease     Polio     Trigeminal neuralgia        CONSULTS   Cardiology    HOSPITAL COURSE  Ms. Borjas is a 89 y.o. with a history of chronic afib, pulmonary hypertension, mitral and tricuspid regurg, recent admission for URI and recent death of her . She came to [facility] ER today after a mechanical fall. She was hypotensive with EMS (and hypertensive in the ER). She had hypothermia in the ER and was given warming blanket and bear hugger. Some bradycardia at times in the ER. She overall says she feels ok. Chronic dyspnea with exertion but denies any cough, chest pain, abdominal pain, dysuria.     She was admitted. Her low temperature improved quickly with warming measures. She was monitored for a couple of days. No evidence of infection by workup or symptoms. TSH was ok (can be rechecked  as outpatient with Angelica Anthony MD), free T4 normal, Cortisol normal    Cardiology consulted by ER for afib with low HR. Likely related to to her hypothermia, BB held initially and now restarted by Cardiology at lower dose. She is not on a/c per Cardiology with falls/fraility. Severe mitral valve regurgitation: Patient unfortunately based on anatomy, age and overall condition is not a candidate for MitraClip per Cardiology. She feels well to discharge today with home health. She will be on oxygen at home for the time being with needing O2 while in the hospital with room air sats 87% on RA. Suspect this is related to her age and CHF. No evidence for PNA or volume overload on imaging and she feels her dyspnea is at baseline.       DIAGNOSTICS  07/18/2025 0412 07/18/2025 0546 Basic Metabolic Panel [[id number]]    (Abnormal)   Blood    Final result Component Value Units   Glucose 79 mg/dL   BUN 38.0 High  mg/dL   Creatinine 1.25 High  mg/dL   Sodium 135 Low  mmol/L   Potassium 4.8 mmol/L   Chloride 104 mmol/L   CO2 22.1 mmol/L   Calcium 8.9 mg/dL   BUN/Creatinine Ratio 30.4 High     Anion Gap 8.9 mmol/L   eGFR 40.5 Low  mL/min/1.73          07/18/2025 0412 07/18/2025 0510 CBC Auto Differential [[id number]]   (Abnormal)   Blood    Final result Component Value Units   WBC 6.08 10*3/mm3   RBC 3.50 Low  10*6/mm3   Hemoglobin 10.9 Low  g/dL   Hematocrit 35.6 %   .7 High  fL   MCH 31.1 pg   MCHC 30.6 Low  g/dL   RDW 14.2 %   RDW-SD 52.7 fl   MPV 10.6 fL   Platelets 101 Low  10*3/mm3   Neutrophil % 73.0 %   Lymphocyte % 17.6 Low  %   Monocyte % 8.2 %   Eosinophil % 0.7 %   Basophil % 0.2 %               07/17/2025 0640 07/17/2025 1322 Cortisol [[id number]]    Blood    Final result Component Value Units   Cortisol 26.80 mcg/dL          07/17/2025 0640 07/17/2025 1235 TSH [[id number]]   (Abnormal)   Blood    Final result Component Value Units   TSH 6.120 High  uIU/mL          07/17/2025 0640 07/17/2025 1456 T4, Free  [[id number]]   Blood    Final result Component Value Units   Free T4 1.46 ng/dL          07/17/2025 0633 07/17/2025 0757 Urinalysis With Culture If Indicated - Straight Cath [[id number]]    (Abnormal)   Urine from Straight Cath    Final result Component Value   Color, UA Yellow   Appearance, UA Clear   pH, UA 8.0   Specific Gravity, UA 1.016   Glucose, UA Negative   Ketones, UA Negative   Bilirubin, UA Negative   Blood, UA Trace Abnormal    Protein, UA 30 mg/dL (1+) Abnormal    Leuk Esterase, UA Trace Abnormal    Nitrite, UA Negative   Urobilinogen, UA 1.0 E.U./dL          07/17/2025 0633 07/17/2025 0757 Urinalysis, Microscopic Only - Straight Cath [[id number]]   (Abnormal)   Urine from Straight Cath    Final result Component Value Units   RBC, UA 11-20 Abnormal  /HPF   WBC, UA 0-2  /HPF   Bacteria, UA None Seen /HPF   Squamous Epithelial Cells, UA 0-2 /HPF   Hyaline Casts, UA 0-2 /LPF   Methodology Automated Microscopy                  Procedure Component Value Units Date/Time    XR Chest 1 View [[id number]] Donald as Reviewed   Order Status: Completed Collected: 07/17/25 0759    Updated: 07/17/25 0807   Narrative:     XR CHEST 1 VW-, XR SPINE LUMBAR 2 OR 3 VW-, XR HIP W OR WO PELVIS 2-3  VIEW RIGHT-     HISTORY: Female who is 89 years-old, trauma, sepsis     TECHNIQUE: Frontal view of the chest, frontal view of the pelvis, 2  views of the right hip, 3 views of the lumbar spine     COMPARISON: 6/8/2025     FINDINGS:     Chest x-ray: The heart is enlarged. Aorta is tortuous, calcified.  Pulmonary vasculature is unremarkable. No focal pulmonary consolidation,  pleural effusion, or pneumothorax. Old granulomatous disease is  apparent. Chronic right rib and shoulder deformities. Chronic narrowing  of the left acromio humeral interval, compatible with rotator cuff  pathology. No acute osseous process.     Lumbar spine: As position, lumbar dextroscoliosis is apparent. Mild  anterior listhesis of L4 on L5, L5 on S1.  Retrolisthesis of L1 on L2, L2  on L3. Vertebral body heights appear preserved. No acute fracture is  identified. Multilevel endplate spurring, disc base narrowing, and facet  arthropathy. Arterial calcifications are conspicuous.     Pelvis and right hip: No acute fracture, erosion, or dislocation is  identified. Mild bilateral hip joint space narrowing. Mild colonic fecal  retention is apparent. Arterial calcifications are seen.     Follow-up/further evaluation can be obtained as indications persist.      Impression:     As described.     This report was finalized on 7/17/2025 8:04 AM by Dr. Esa Corea M.D on Workstation: [id number]       XR Spine Lumbar 2 or 3 View [[id number]] Donald as Reviewed   Order Status: Completed Collected: 07/17/25 0759    Updated: 07/17/25 0807   Narrative:     XR CHEST 1 VW-, XR SPINE LUMBAR 2 OR 3 VW-, XR HIP W OR WO PELVIS 2-3  VIEW RIGHT-     HISTORY: Female who is 89 years-old, trauma, sepsis     TECHNIQUE: Frontal view of the chest, frontal view of the pelvis, 2  views of the right hip, 3 views of the lumbar spine     COMPARISON: 6/8/2025     FINDINGS:     Chest x-ray: The heart is enlarged. Aorta is tortuous, calcified.  Pulmonary vasculature is unremarkable. No focal pulmonary consolidation,  pleural effusion, or pneumothorax. Old granulomatous disease is  apparent. Chronic right rib and shoulder deformities. Chronic narrowing  of the left acromio humeral interval, compatible with rotator cuff  pathology. No acute osseous process.     Lumbar spine: As position, lumbar dextroscoliosis is apparent. Mild  anterior listhesis of L4 on L5, L5 on S1. Retrolisthesis of L1 on L2, L2  on L3. Vertebral body heights appear preserved. No acute fracture is  identified. Multilevel endplate spurring, disc base narrowing, and facet  arthropathy. Arterial calcifications are conspicuous.     Pelvis and right hip: No acute fracture, erosion, or dislocation is  identified. Mild bilateral hip  joint space narrowing. Mild colonic fecal  retention is apparent. Arterial calcifications are seen.     Follow-up/further evaluation can be obtained as indications persist.      Impression:     As described.     This report was finalized on 7/17/2025 8:04 AM by Dr. Esa Corea M.D on Workstation: [id number]       XR Hip With or Without Pelvis 2 - 3 View Right [[id number]] Donald as Reviewed   Order Status: Completed Collected: 07/17/25 0759    Updated: 07/17/25 0807   Narrative:     XR CHEST 1 VW-, XR SPINE LUMBAR 2 OR 3 VW-, XR HIP W OR WO PELVIS 2-3  VIEW RIGHT-     HISTORY: Female who is 89 years-old, trauma, sepsis     TECHNIQUE: Frontal view of the chest, frontal view of the pelvis, 2  views of the right hip, 3 views of the lumbar spine     COMPARISON: 6/8/2025     FINDINGS:     Chest x-ray: The heart is enlarged. Aorta is tortuous, calcified.  Pulmonary vasculature is unremarkable. No focal pulmonary consolidation,  pleural effusion, or pneumothorax. Old granulomatous disease is  apparent. Chronic right rib and shoulder deformities. Chronic narrowing  of the left acromio humeral interval, compatible with rotator cuff  pathology. No acute osseous process.     Lumbar spine: As position, lumbar dextroscoliosis is apparent. Mild  anterior listhesis of L4 on L5, L5 on S1. Retrolisthesis of L1 on L2, L2  on L3. Vertebral body heights appear preserved. No acute fracture is  identified. Multilevel endplate spurring, disc base narrowing, and facet  arthropathy. Arterial calcifications are conspicuous.     Pelvis and right hip: No acute fracture, erosion, or dislocation is  identified. Mild bilateral hip joint space narrowing. Mild colonic fecal  retention is apparent. Arterial calcifications are seen.     Follow-up/further evaluation can be obtained as indications persist.      Impression:     As described.         PHYSICAL EXAM  Objective:  Vital signs: (most recent): Blood pressure 148/76, pulse 78, temperature  "97.3 °F (36.3 °C), temperature source Oral, resp. rate 18, height 152.4 cm (60\"), weight 46.7 kg (103 lb), SpO2 (!) 87%, not currently breastfeeding.                Alert  nad  No resp distress  +murmur  Elderly  No volume overload  Wishes for dc today     CONDITION ON DISCHARGE  Stable.      DISCHARGE DISPOSITION   Home or Self Care      DISCHARGE MEDICATIONS       Your medication list        CHANGE how you take these medications        Instructions Last Dose Given Next Dose Due   atenolol 25 MG tablet  Commonly known as: TENORMIN  Start taking on: July 20, 2025  What changed:   medication strength  how much to take  when to take this      Take 1 tablet by mouth Daily.              CONTINUE taking these medications        Instructions Last Dose Given Next Dose Due   acetaminophen 325 MG tablet  Commonly known as: TYLENOL      Take 2 tablets by mouth Every 6 (Six) Hours As Needed for Mild Pain, Moderate Pain, Headache or Fever.       amLODIPine 5 MG tablet  Commonly known as: NORVASC      Take 1 tablet by mouth Daily.       aspirin 81 MG EC tablet      Take 1 tablet by mouth Daily.       brimonidine 0.2 % ophthalmic solution  Commonly known as: ALPHAGAN      Administer 1 drop to both eyes 2 (Two) Times a Day.       calcium carbonate-vitamin d 600-400 MG-UNIT per tablet      Take 1 tablet by mouth Daily.       carBAMazepine  MG 12 hr tablet  Commonly known as: TEGretol  XR      Take 1 tablet by mouth 2 (Two) Times a Day.       cyclobenzaprine 5 MG tablet  Commonly known as: FLEXERIL      Take 1 tablet by mouth 3 (Three) Times a Day As Needed for Muscle Spasms.       dorzolamide 2 % ophthalmic solution  Commonly known as: TRUSOPT      1 drop 3 (Three) Times a Day.       doxazosin 4 MG tablet  Commonly known as: CARDURA      Take 1 tablet by mouth every night at bedtime.       fluticasone 50 MCG/ACT nasal spray  Commonly known as: FLONASE      Administer 2 sprays into the nostril(s) as directed by provider " Daily.       furosemide 20 MG tablet  Commonly known as: LASIX      Take 1 tablet by mouth Daily.       levothyroxine 75 MCG tablet  Commonly known as: SYNTHROID, LEVOTHROID      Take 1 tablet by mouth Every Morning.       multivitamin with minerals tablet tablet      Take  by mouth.       spironolactone 25 MG tablet  Commonly known as: ALDACTONE      Take 1 tablet by mouth Daily.       traZODone 50 MG tablet  Commonly known as: DESYREL      Take 1 tablet by mouth Every Night.                 Where to Get Your Medications        These medications were sent to [place] [id number]      Hours: Monday to Friday 7 AM to 6 PM, Saturday & Sunday 8 AM to 4:30 PM (Closed 12 PM to 12:30 PM) Phone: [phone]   atenolol 25 MG tablet        No future appointments.  Additional Instructions for the Follow-ups that You Need to Schedule       Ambulatory Referral to Home Health   As directed      Per Attending, OK with  SOC nursing for Monday 7/21/25.    Per Attending, OK with  SOC nursing for Monday 7/21/25.    Order Comments: Per Attending, OK with HH SOC nursing for Monday 7/21/25.    Face to Face Visit Date: 7/18/2025   Follow-up provider for Plan of Care?: I treated the patient in an acute care facility and will not continue treatment after discharge.   Follow-up provider: KHADIJAH SÁNCHEZ [[id number]]   Reason/Clinical Findings: Hypothermia, CHF, euvolemic, fall at home PTA   Describe mobility limitations that make leaving home difficult: Requires assist of one plus device to leave house   Nursing/Therapeutic Services Requested: Skilled Nursing Physical Therapy Occupational Therapy   Skilled nursing orders: Medication education Pain management CHF management Cardiopulmonary assessments   PT orders: Therapeutic exercise Gait Training Transfer training Strengthening Home safety assessment   Weight Bearing Status: As Tolerated   Occupational orders: Activities of daily living  Energy conservation Strengthening Cognition Fine motor Home safety assessment   Frequency: 1 Week 1        Ambulatory Referral to Home Health   As directed      Angelica Anthony MD    Order Comments: Angelica Anthony MD    Face to Face Visit Date: 7/19/2025   Follow-up provider for Plan of Care?: I treated the patient in an acute care facility and will not continue treatment after discharge.   Follow-up provider: ANGELICA ANTHONY [[id number]]   Reason/Clinical Findings: debility, CHF, weakness   Describe mobility limitations that make leaving home difficult: debility, CHF, weakness   Nursing/Therapeutic Services Requested: Skilled Nursing Physical Therapy Occupational Therapy   Skilled nursing orders: Medication education CHF management   PT orders: Therapeutic exercise Strengthening   Occupational orders: Activities of daily living Strengthening        Discharge Follow-up with Specialty: PCP in 1-2 weeks, Cardiology in 2 weeks   As directed      Specialty: PCP in 1-2 weeks, Cardiology in 2 weeks               Follow-up Information       Angelica Anthony MD .    Specialty: Internal Medicine  Contact information:  [address] [place] [address]  [phone]                             TEST  RESULTS PENDING AT DISCHARGE  Pending Labs       Order Current Status    Blood Culture - Blood, Arm, Left Preliminary result    Blood Culture - Blood, Arm, Left Preliminary result               Liam Bynum MD  [facility]  07/19/25  10:39 EDT      Time: greater than 32 minutes on discharge  It was a pleasure taking care of this patient while in the hospital.

## 2025-07-19 NOTE — DISCHARGE PLACEMENT REQUEST
"Inge Borjas (89 y.o. Female)       Date of Birth   08/06/1932    Social Security Number       Address   [address] [place] [address] [address] [place] [address]    Home Phone   [phone]    MRN   [id number]       [facility]    Marital Status                               Admission Date   7/17/2025    Admission Type   Emergency    Admitting Provider   Liam Bynum MD    Attending Provider   Liam Bynum MD    Department, Room/Bed   [address], S414/1       Discharge Date       Discharge Disposition   Home or Self Care    Discharge Destination                                 Attending Provider: Liam Bynum MD    Allergies: Cardizem [Diltiazem Hcl], Amoxicillin    Isolation: None   Infection: None   Code Status: No CPR    Ht: 152.4 cm (60\")   Wt: 46.7 kg (103 lb)    Admission Cmt: None   Principal Problem: Hypothermia [T68.XXXA]                   Active Insurance as of 7/17/2025       Primary Coverage       Payor Plan Insurance Group Employer/Plan Group    MEDICARE MEDICARE A & B        Payor Plan Address Payor Plan Phone Number Payor Plan Fax Number Effective Dates    [address] [phone]  10/1/1999 - None Entered    [facility] [id number]         Subscriber Name Subscriber Birth Date Member ID       INGE BORJAS 8/6/1932 [id number]               Secondary Coverage       Payor Plan Insurance Group Employer/Plan Group    PH[place]       Payor Plan Address Payor Plan Phone Number Payor Plan Fax Number Effective Dates    ATTN CLAIMS DEPT [phone]  6/1/2002 - None Entered    [address] [place] [id number]         Subscriber Name Subscriber Birth Date Member ID       INGE BORJAS 8/6/1932 [id number]                     Emergency Contacts        (Rel.) Home Phone Work Phone Mobile Phone    Precious Paula (HCS) (Friend) -- -- [phone]              {Outbreak/Travel/Exposure " Documentation......;  Question Available Choices Patient Response   COVID-19 Outbreak Screen:  Do you currently have a new onset of the following symptoms?        Fever/Chills, Cough, Shortness of air, Loss of taste or smell, No, Unknown  (not recorded)   COVID-19 Outbreak Screen: In the last 14 days, have you had contact with anyone who is ill, has show any of the symptoms listed above and/or has been diagnosis with the 2019 Novel Coronavirus? This includes any immediate household members but excludes any patients with whom you have been in contact within your normal work duties wearing proper PPE, if you are a healthcare worker.  Yes, No, Unknown              (not recorded)   COVID-19 Outbreak Screen: Who was notified? Free text (not recorded)   Ebola Screening Outbreak Screen: Have you traveled to the [place] the [place] or [place] within the past 21 days?  Yes, No, Unknown (not recorded)   Ebola Screening Outbreak Screen: Do you have ANY of the following symptoms: Fever/Chills, Vomiting, Diarrhea, Fatigue, Headache, Muscle pain, Unexplained bleeding, Abdominal (stomach) pain, No, Unknown (not recorded)   Ebola Screening Outbreak Screen: Name of Person notified Free text (not recorded)   Travel Screen: Have you traveled in the last month? If so, to what country have you traveled? If [place] what state? Yes, No, Unknown  List of all countries  List of all States (not recorded)  (not recorded)  (not recorded)   Infection Risk: Do you currently have the following symptoms?  (If cough is selected, the Tuberculosis Screen is performed.) Cough, Fever, Rash, No No (07/17/25 8675)   Tuberculosis Screen: Do you have any of the following Tuberculosis Risks?  Have you lived or spent time with anyone who had or may have TB?  Have you lived in or visited any of the following areas for more than one month: [place], [place], [place], [place] or [place], the [place] or [place]?  Do you have HIV/AIDS?  Have you  lived in or worked in a nursing home, homeless shelter, correctional facility, or substance abuse treatment facility?   No    If Yes do you have any of the following symptoms? Yes responses display to the right    If Yes, symptoms listed are:  Cough greater than or equal to 3 weeks, Loss of appetite, Unexplained weight loss, Night sweats, Bloody sputum or hemoptysis, Hoarseness, Fever, Fatigue, Chest pain, No No (07/17/25 9580)  (not recorded)   Exposure Screen: Have you been exposed to any of these contagious diseases in the last month? Measles, Chickenpox, Meningitis, Pertussis, Whooping Cough, No No (07/17/25 2363)

## 2025-07-19 NOTE — PROGRESS NOTES
Continued Stay Note  [facility]     Patient Name: Inge Borjas  MRN: [id number]  Today's Date: 7/19/2025    Admit Date: 7/17/2025    Plan: Home with [facility]   Discharge Plan       Row Name 07/19/25 1435       Plan    Plan Home with [facility]    Roadmap to Recovery Yes    Patient/Family in Agreement with Plan yes    Provided Post Acute Provider List? Refused    Provided Post Acute Provider Quality & Resource List? Refused    Plan Comments Patient to discharge home with [facility]      Row Name 07/19/25 1329       Plan    Final Discharge Disposition Code 06 - home with home health care    Final Note Inbound call from RN that patient is discharging back to [facility] and has HH orders. CCP confirmed with Jeanine/[facility], patient accepted. CCP noted 02 orders and confirmed with Trey/[facility] ([phone]) that patient able to return with oxygen. CCP spoke with patient's HCS, Precious, who is agreeable to 02 with no DME preference. Referral placed for [facility] and per Shanika/[facility] 02 orders valid. CCP delivered tank to room and instructed patient/HCS to call company when leaving hospital to complete 02 set up. RN updated. No further discharge needs noted. Luis GAVIN CCP                   Discharge Codes    No documentation.                 Expected Discharge Date and Time       Expected Discharge Date Expected Discharge Time    Jul 19, 2025               Jeanine Zavala RN

## 2025-07-19 NOTE — DISCHARGE PLACEMENT REQUEST
"Inge Borjas (89 y.o. Female)       Date of Birth   08/06/1932    Social Security Number       Address   [address] [place] [address] [address] [place] [address]    Home Phone   [phone]    MRN   [id number]       [facility]    Marital Status                               Admission Date   7/17/2025    Admission Type   Emergency    Admitting Provider   Liam Bynum MD    Attending Provider       Department, Room/Bed   [address], S414/1       Discharge Date   7/19/2025    Discharge Disposition   Home or Self Care    Discharge Destination                                 Attending Provider: (none)   Allergies: Cardizem [Diltiazem Hcl], Amoxicillin    Isolation: None   Infection: None   Code Status: No CPR    Ht: 152.4 cm (60\")   Wt: 46.7 kg (103 lb)    Admission Cmt: None   Principal Problem: Hypothermia [T68.XXXA]                   Active Insurance as of 7/17/2025       Primary Coverage       Payor Plan Insurance Group Employer/Plan Group    MEDICARE MEDICARE A & B        Payor Plan Address Payor Plan Phone Number Payor Plan Fax Number Effective Dates    [address] [phone]  10/1/1999 - None Entered    [facility] [id number]         Subscriber Name Subscriber Birth Date Member ID       INGE BORJAS 8/6/1932 [id number]               Secondary Coverage       Payor Plan Insurance Group Employer/Plan Group    PHYSICIANS [employer] PHYSICIANS MUTUAL PLAN F       Payor Plan Address Payor Plan Phone Number Payor Plan Fax Number Effective Dates    ATTN CLAIMS DEPT [phone]  6/1/2002 - None Entered    [address] [place] [id number]         Subscriber Name Subscriber Birth Date Member ID       INGE BORJAS 8/6/1932 [id number]                     Emergency Contacts        (Rel.) Home Phone Work Phone Mobile Phone    Precious Paula (HCS) (Friend) -- -- [phone]                "

## 2025-07-19 NOTE — CASE MANAGEMENT/SOCIAL WORK
Case Management Discharge Note      Final Note: Inbound call from RN that patient is discharging back to [facility] independent living and has HH orders. CCP confirmed with Jeanine/[facility], patient accepted. CCP noted 02 orders and confirmed with Trey/[facility] ([phone]) that patient able to return with oxygen. CCP spoke with patient's HCS, Precious, who is agreeable to 02 with no DME preference. Referral placed for [facility] and per Shanika/[facility] 02 orders valid. CCP delivered tank to room and instructed patient/HCS to call company when leaving hospital to complete 02 set up. RN updated. No further discharge needs noted. Luis GAVIN CCP    Provided Post Acute Provider List?: Refused  Refused Provider List Comment: Per patient prefers [facility] , used in the past    Selected Continued Care - Admitted Since 7/17/2025       Destination    No services have been selected for the patient.                Durable Medical Equipment Coordination complete.      Service Provider Services Address Phone Fax Patient Preferred    [facility] [address] [facility] [address] [phone] --              Dialysis/Infusion    No services have been selected for the patient.                Home Medical Care Coordination complete.      Service Provider Services Address Phone Fax Patient Preferred    [facility] [address] [facility] [address], [address], [facility] [address] [phone] --              Therapy    No services have been selected for the patient.                Community Resources    No services have been selected for the patient.                Community & DME    No services have been selected for the patient.                    Selected Continued Care - Prior Encounters Includes continued care and service providers with selected services from prior encounters from 4/18/2025 to 7/19/2025      Discharged on 6/12/2025 Admission date:  6/8/2025 - Discharge disposition: Skilled Nursing Facility (DC - External)      Destination       Service Provider Services Address Phone Fax Patient Preferred    [facility] [address] [place], [place] [id number] [phone] --                          Transportation Services  Transportation: Private Transportation  Private: Car    Final Discharge Disposition Code: 06 - home with home health care

## 2025-07-19 NOTE — PLAN OF CARE
Goal Outcome Evaluation:  Plan of Care Reviewed With: patient        Progress: improving  Outcome Evaluation: discharged

## 2025-07-19 NOTE — PLAN OF CARE
Problem: Adult Inpatient Plan of Care  Goal: Plan of Care Review  Outcome: Progressing   Goal Outcome Evaluation:  VSS. 2L nc. Afib. A&Ox4. Bed alarm. Pt rested throughout the night with no issues or complaints. Anticipate discharge back to [facility] this morning.

## 2025-07-19 NOTE — PROGRESS NOTES
Start PACC Note    Home Health Referral    Evaluated patient and on Home Care and services available. Patient offered choice of available HHC and agreeable to SN/PT/OT services with [facility].    Isolation Precautions: No active isolations    START PATIENT REGISTRATION INFORMATION  Order Information  Order Signing Physician: Liam Bynum MD  Service Ordered RN?: Yes  Service Ordered PT?: Yes  Service Ordered OT?: Yes  Service Ordered ST?: No  Service Ordered MSW?: No  Service Ordered HHA?: No  Following Physician: Angelica Anthony MD  Following Physician Phone: [phone]  Overseeing Physician: Angelica Anthony MD  (Required for Residents Only)  Agreeable to Follow ? Yes  Date/Time of Call 07/19/25 14:03 EDT, Spoke with: orders in epic    Care Coordination  Same Day SOC?: No  Primary Care Physician: Angelica Anthony MD  Primary Care Physician Phone: [phone]  Primary Care Physician Address: [address] [place] [address]  Visit Instructions: N/A  Service Discharge Location Type: Home  Service Facility Name: N/A  Service Floor Facility: N/A  Service Room No: N/A        Demographics  Patient Last Name: [place]  Patient First Name: Inge  Language/Communication Barrier: none  Service Address: [address] [place] [address], [address]  Service City: [place]  Service State: [place]  Service Zip: [address]  Service Home Phone: [phone]  Other Phone Numbers:   No relevant phone numbers on file.     Emergency Contact:   Extended Emergency Contact Information  Primary Emergency Contact: Precious Paula ([facility])  Mobile Phone: [phone]  Relation: Friend  Preferred language: English   needed? No    Admission Information  Admit Date: 7/17/2025  Patient Status at Discharge: Inpatient  Admitting Diagnosis: Hypothermia [T68.XXXA]  Hypothermia, initial encounter [T68.XXXA]  Atrial fibrillation, unspecified type [I48.91]    Caregiver Information  Caregiver First Name: Precious  Caregiver Last Name:  Chai  Caregiver Relationship to Patient: friend/HCS  Caregiver Phone Number: [phone]  Caregiver Notes: N/A    HITECH  [employer] List  No      END PATIENT REGISTRATION INFORMATION        Start PACC Summary    Additional Comments: none    END PACC Summary    Discharge Date: Pending    Referral Source:  [facility]    Signed By: Jeanine Zavala RN, 7/19/2025, 14:03 EDT     Date/Time: 07/19/25 14:03 EDT    End PACC Note

## 2025-07-21 ENCOUNTER — READMISSION MANAGEMENT (OUTPATIENT)
Dept: CALL CENTER | Facility: HOSPITAL | Age: OVER 89
End: 2025-07-21
Payer: MEDICARE

## 2025-07-21 NOTE — OUTREACH NOTE
Prep Survey      Flowsheet Row Responses   [facility] facility patient discharged from? [facility]   Is LACE score < 7 ? No   Eligibility Readm Mgmt   Discharge diagnosis Hypothermia   Does the patient have one of the following disease processes/diagnoses(primary or secondary)? Other   Does the patient have Home health ordered? Yes   What is the Home health agency?  [facility]   Is there a DME ordered? Yes   What DME was ordered? Oxygen Therapy--[facility]   Medication alerts for this patient see AVS   Prep survey completed? Yes            Kayley MINA - Registered Nurse

## 2025-07-22 LAB
BACTERIA SPEC AEROBE CULT: NORMAL
BACTERIA SPEC AEROBE CULT: NORMAL

## 2025-07-26 ENCOUNTER — APPOINTMENT (OUTPATIENT)
Dept: GENERAL RADIOLOGY | Facility: HOSPITAL | Age: OVER 89
End: 2025-07-26
Payer: MEDICARE

## 2025-07-26 ENCOUNTER — APPOINTMENT (OUTPATIENT)
Dept: CT IMAGING | Facility: HOSPITAL | Age: OVER 89
End: 2025-07-26
Payer: MEDICARE

## 2025-07-26 ENCOUNTER — HOSPITAL ENCOUNTER (EMERGENCY)
Facility: HOSPITAL | Age: OVER 89
Discharge: HOME | End: 2025-07-26
Attending: EMERGENCY MEDICINE
Payer: MEDICARE

## 2025-07-26 VITALS
SYSTOLIC BLOOD PRESSURE: 146 MMHG | TEMPERATURE: 97.4 F | HEIGHT: 60 IN | DIASTOLIC BLOOD PRESSURE: 100 MMHG | WEIGHT: 100 LBS | HEART RATE: 67 BPM | BODY MASS INDEX: 19.63 KG/M2 | RESPIRATION RATE: 16 BRPM | OXYGEN SATURATION: 100 %

## 2025-07-26 DIAGNOSIS — S52.502A CLOSED FRACTURE OF DISTAL END OF LEFT RADIUS, UNSPECIFIED FRACTURE MORPHOLOGY, INITIAL ENCOUNTER: ICD-10-CM

## 2025-07-26 DIAGNOSIS — S09.90XA INJURY OF HEAD, INITIAL ENCOUNTER: Primary | ICD-10-CM

## 2025-07-26 DIAGNOSIS — S52.512A CLOSED DISPLACED FRACTURE OF STYLOID PROCESS OF LEFT RADIUS, INITIAL ENCOUNTER: ICD-10-CM

## 2025-07-26 LAB
ALBUMIN SERPL-MCNC: 4.1 G/DL (ref 3.5–5.2)
ALBUMIN/GLOB SERPL: 1.9 G/DL
ALP SERPL-CCNC: 101 U/L (ref 39–117)
ALT SERPL W P-5'-P-CCNC: 21 U/L (ref 1–33)
ANION GAP SERPL CALCULATED.3IONS-SCNC: 9.5 MMOL/L (ref 5–15)
AST SERPL-CCNC: 23 U/L (ref 1–32)
BASOPHILS # BLD AUTO: 0.01 10*3/MM3 (ref 0–0.2)
BASOPHILS NFR BLD AUTO: 0.2 % (ref 0–1.5)
BILIRUB SERPL-MCNC: 0.5 MG/DL (ref 0–1.2)
BUN SERPL-MCNC: 45.7 MG/DL (ref 8–23)
BUN/CREAT SERPL: 33.6 (ref 7–25)
CALCIUM SPEC-SCNC: 9.9 MG/DL (ref 8.2–9.6)
CHLORIDE SERPL-SCNC: 99 MMOL/L (ref 98–107)
CO2 SERPL-SCNC: 25.5 MMOL/L (ref 22–29)
CREAT SERPL-MCNC: 1.36 MG/DL (ref 0.57–1)
DEPRECATED RDW RBC AUTO: 57.7 FL (ref 37–54)
EGFRCR SERPLBLD CKD-EPI 2021: 36.6 ML/MIN/1.73
EOSINOPHIL # BLD AUTO: 0.01 10*3/MM3 (ref 0–0.4)
EOSINOPHIL NFR BLD AUTO: 0.2 % (ref 0.3–6.2)
ERYTHROCYTE [DISTWIDTH] IN BLOOD BY AUTOMATED COUNT: 15.6 % (ref 12.3–15.4)
GEN 5 1HR TROPONIN T REFLEX: 26 NG/L
GLOBULIN UR ELPH-MCNC: 2.2 GM/DL
GLUCOSE SERPL-MCNC: 101 MG/DL (ref 65–99)
HCT VFR BLD AUTO: 33.9 % (ref 34–46.6)
HGB BLD-MCNC: 10.6 G/DL (ref 12–15.9)
IMM GRANULOCYTES # BLD AUTO: 0.01 10*3/MM3 (ref 0–0.05)
IMM GRANULOCYTES NFR BLD AUTO: 0.2 % (ref 0–0.5)
LYMPHOCYTES # BLD AUTO: 0.82 10*3/MM3 (ref 0.7–3.1)
LYMPHOCYTES NFR BLD AUTO: 13.6 % (ref 19.6–45.3)
MCH RBC QN AUTO: 31.3 PG (ref 26.6–33)
MCHC RBC AUTO-ENTMCNC: 31.3 G/DL (ref 31.5–35.7)
MCV RBC AUTO: 100 FL (ref 79–97)
MONOCYTES # BLD AUTO: 0.62 10*3/MM3 (ref 0.1–0.9)
MONOCYTES NFR BLD AUTO: 10.3 % (ref 5–12)
NEUTROPHILS NFR BLD AUTO: 4.56 10*3/MM3 (ref 1.7–7)
NEUTROPHILS NFR BLD AUTO: 75.5 % (ref 42.7–76)
PLATELET # BLD AUTO: 111 10*3/MM3 (ref 140–450)
PMV BLD AUTO: 10.3 FL (ref 6–12)
POTASSIUM SERPL-SCNC: 5.1 MMOL/L (ref 3.5–5.2)
PROT SERPL-MCNC: 6.3 G/DL (ref 6–8.5)
QT INTERVAL: 377 MS
QTC INTERVAL: 436 MS
RBC # BLD AUTO: 3.39 10*6/MM3 (ref 3.77–5.28)
SODIUM SERPL-SCNC: 134 MMOL/L (ref 136–145)
TROPONIN T % DELTA: -7
TROPONIN T NUMERIC DELTA: -2 NG/L
TROPONIN T SERPL HS-MCNC: 28 NG/L
WBC NRBC COR # BLD AUTO: 6.03 10*3/MM3 (ref 3.4–10.8)

## 2025-07-26 PROCEDURE — 99284 EMERGENCY DEPT VISIT MOD MDM: CPT | Performed by: PHYSICIAN ASSISTANT

## 2025-07-26 PROCEDURE — 70450 CT HEAD/BRAIN W/O DYE: CPT

## 2025-07-26 PROCEDURE — 96361 HYDRATE IV INFUSION ADD-ON: CPT

## 2025-07-26 PROCEDURE — 70486 CT MAXILLOFACIAL W/O DYE: CPT

## 2025-07-26 PROCEDURE — 72125 CT NECK SPINE W/O DYE: CPT

## 2025-07-26 PROCEDURE — 99284 EMERGENCY DEPT VISIT MOD MDM: CPT

## 2025-07-26 PROCEDURE — 85025 COMPLETE CBC W/AUTO DIFF WBC: CPT | Performed by: PHYSICIAN ASSISTANT

## 2025-07-26 PROCEDURE — 80053 COMPREHEN METABOLIC PANEL: CPT | Performed by: PHYSICIAN ASSISTANT

## 2025-07-26 PROCEDURE — 71045 X-RAY EXAM CHEST 1 VIEW: CPT

## 2025-07-26 PROCEDURE — 36415 COLL VENOUS BLD VENIPUNCTURE: CPT

## 2025-07-26 PROCEDURE — 96374 THER/PROPH/DIAG INJ IV PUSH: CPT

## 2025-07-26 PROCEDURE — 25010000002 ONDANSETRON PER 1 MG: Performed by: PHYSICIAN ASSISTANT

## 2025-07-26 PROCEDURE — 72170 X-RAY EXAM OF PELVIS: CPT

## 2025-07-26 PROCEDURE — 25010000002 FENTANYL CITRATE (PF) 50 MCG/ML SOLUTION: Performed by: PHYSICIAN ASSISTANT

## 2025-07-26 PROCEDURE — 96375 TX/PRO/DX INJ NEW DRUG ADDON: CPT

## 2025-07-26 PROCEDURE — 84484 ASSAY OF TROPONIN QUANT: CPT | Performed by: PHYSICIAN ASSISTANT

## 2025-07-26 PROCEDURE — 73110 X-RAY EXAM OF WRIST: CPT

## 2025-07-26 PROCEDURE — 93005 ELECTROCARDIOGRAM TRACING: CPT | Performed by: PHYSICIAN ASSISTANT

## 2025-07-26 PROCEDURE — 25810000003 SODIUM CHLORIDE 0.9 % SOLUTION: Performed by: PHYSICIAN ASSISTANT

## 2025-07-26 RX ORDER — FENTANYL CITRATE 50 UG/ML
25 INJECTION, SOLUTION INTRAMUSCULAR; INTRAVENOUS ONCE
Refills: 0 | Status: COMPLETED | OUTPATIENT
Start: 2025-07-26 | End: 2025-07-26

## 2025-07-26 RX ORDER — ONDANSETRON 2 MG/ML
4 INJECTION INTRAMUSCULAR; INTRAVENOUS ONCE
Status: COMPLETED | OUTPATIENT
Start: 2025-07-26 | End: 2025-07-26

## 2025-07-26 RX ADMIN — FENTANYL CITRATE 25 MCG: 50 INJECTION, SOLUTION INTRAMUSCULAR; INTRAVENOUS at 16:21

## 2025-07-26 RX ADMIN — ONDANSETRON 4 MG: 2 INJECTION, SOLUTION INTRAMUSCULAR; INTRAVENOUS at 16:20

## 2025-07-26 RX ADMIN — SODIUM CHLORIDE 500 ML: 9 INJECTION, SOLUTION INTRAVENOUS at 17:05

## 2025-07-26 NOTE — [facility] PROVIDER NOTE
Subjective   History of Present Illness  Patient is a 89-year-old female who had a fall around 4 AM.  She had gotten up out of bed and was walking in the other room.  She is not sure why she fell.  She has a lifeline and lives in an assisted living.  She says she was on the floor until someone came and got her up.  She does not know how long it took.  She presents to the emergency room with a  who says she was called at 10 AM and told about the patient falling today.  The only pain the patient complains that is her left wrist.  She takes a baby aspirin but no other blood thinners.  She does not have headache and does not complain of neck, chest, abdomen pelvis or extremity pain other than the left wrist.  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  She also has polio that affected her right side.      Review of Systems   Musculoskeletal:  Positive for arthralgias and joint swelling. Negative for back pain and neck pain.   Skin:  Positive for color change.   Neurological:  Negative for headaches.   All other systems reviewed and are negative.      Past Medical History:   Diagnosis Date    Anemia     Arthritis     Atrial fibrillation     Intermittently    Colon cancer 2012    Stage III    Disease of thyroid gland     hypothyroid    Drug therapy 2012    pill for 6 months    H/O Herpes simplex     virus of the lip    Hyperlipidemia     Hypertension     Intervertebral disk disease     Polio     Trigeminal neuralgia        Allergies   Allergen Reactions    Cardizem [Diltiazem Hcl] Unknown - Low Severity    Amoxicillin Rash       Past Surgical History:   Procedure Laterality Date    ARTHROSCOPY SHOULDER W/ OPEN ROTATOR CUFF REPAIR      BREAST BIOPSY Left     at least 25 years ago.    CARDIAC CATHETERIZATION  3/25/2025    Procedure: Right and Left Heart Cath;  Surgeon: Mukul Bloom MD;  Location: [facility] INVASIVE LOCATION;  Service: Cardiology;;    CARDIAC CATHETERIZATION N/A 3/25/2025    Procedure:  Coronary angiography;  Surgeon: Mukul Bloom MD;  Location: [facility] INVASIVE LOCATION;  Service: Cardiology;  Laterality: N/A;    COLON RESECTION  08/2015    JOINT REPLACEMENT  1947    Elbow transplant and shoulder fusion    TONSILLECTOMY         Family History   Problem Relation Age of Onset    Colon cancer Father         Late in life    Heart disease Father     No Known Problems Mother        Social History     Socioeconomic History    Marital status:      Spouse name: Burt   Tobacco Use    Smoking status: Never     Passive exposure: Never    Smokeless tobacco: Never   Vaping Use    Vaping status: Never Used   Substance and Sexual Activity    Alcohol use: No    Drug use: No    Sexual activity: Defer     Partners: Male           Objective   Physical Exam  Vitals and nursing note reviewed.   Constitutional:       Appearance: Normal appearance.   HENT:      Head: Normocephalic and atraumatic.   Eyes:      Conjunctiva/sclera: Conjunctivae normal.      Comments: Blind in the left eye and has left periorbital ecchymosis.  Right eye appears normal without hyphema or conjunctivitis   Cardiovascular:      Rate and Rhythm: Normal rate and regular rhythm.   Abdominal:      General: Bowel sounds are normal. There is no distension.      Palpations: Abdomen is soft.      Tenderness: There is no abdominal tenderness. There is no right CVA tenderness, left CVA tenderness or guarding.   Musculoskeletal:         General: Swelling, tenderness, deformity and signs of injury present.      Cervical back: Normal range of motion and neck supple. No tenderness.      Comments: Significant soft tissue swelling and redness of the left wrist, pulse intact, sensation intact, normal cap refill   Skin:     General: Skin is warm and dry.   Neurological:      General: No focal deficit present.      Mental Status: She is alert and oriented to person, place, and time.      Comments: Walks slowly and needs assistance  transferring from the wheelchair to the [facility]   Psychiatric:         Mood and Affect: Mood normal.         Behavior: Behavior normal.         ECG 12 Lead      Date/Time: 7/26/2025 4:30 PM    Performed by: Nuris Sanchez PA-C  Authorized by: Dolores Means MD  Interpreted by ED physician  Comparison: compared with previous ECG from 7/17/2025  Similar to previous ECG  Rhythm: atrial fibrillation  Rate: normal  BPM: 80  QRS axis: normal  Conduction: conduction normal  ST Segments: ST segments normal  Clinical impression: abnormal ECG               ED Course                                           Medical Decision Making  Patient has significant bruising around the left eye.  She is legally blind in that eye and has a white coating over it that is chronic.  She is alert and oriented x 4.  She does not have neck pain but due to age scan patient's head, face and neck which did not show fractures.  Also because of the fall in the middle of the night and her not knowing why she fell we ordered blood work, chest x-ray and EKG.  They said there is a pleural effusion on the left that has not been there prior.  She can follow that up with her doctor.  She is not having any dyspnea and oxygen is normal.  She has a left radial styloid fracture.  I put patient in a Velcro splint and she will use sling that she will use at all times unless she is bathing.  They will follow-up with orthopedist and I gave her referral to Dr. Woods and also some extra names if they choose to follow-up with a different orthopedist.  2 set troponin normal.  EKG stable.  Very likely mechanical fall in the middle of the night.  Gave her some fluids and fentanyl.  She is feeling better.  She will take Tylenol and use ice on the wrist.  She will return to emergency room if symptoms worsen or she has any new concerns.  She does have polio and cannot use her right arm and now she is injured her left.   say they are working with the Center where  she lives as they said they can help her with eating since she will not be able to feed herself.    --------------------            07/26/25               1830     --------------------   BP:                  Pulse:      67       Resp:                Temp:                SpO2:      100%     --------------------    Labs Reviewed  TROPONIN - Abnormal; Notable for the following components:     HS Troponin T                 28 (*)              All other components within normal limits         Narrative: High Sensitive Troponin T Reference Range:                  <14.0 ng/L- Negative Female for AMI                  <22.0 ng/L- Negative Male for AMI                  >=14 - Abnormal Female indicating possible myocardial injury.                  >=22 - Abnormal Male indicating possible myocardial injury.                   Clinicians would have to utilize clinical acumen, EKG, Troponin, and serial changes to determine if it is an Acute Myocardial Infarction or myocardial injury due to an underlying chronic condition.                                       COMPREHENSIVE METABOLIC PANEL - Abnormal; Notable for the following components:     Glucose                       101 (*)                BUN                           45.7 (*)               Creatinine                    1.36 (*)               Sodium                        134 (*)                Calcium                       9.9 (*)                BUN/Creatinine Ratio          33.6 (*)               eGFR                          36.6 (*)            All other components within normal limits         Narrative: GFR Categories in Chronic Kidney Disease (CKD)                                              GFR Category          GFR (mL/min/1.73)    Interpretation                  G1                    90 or greater        Normal or high (1)                  G2                    60-89                Mild decrease (1)                  G3a                    45-59                Mild to moderate decrease                  G3b                   30-44                Moderate to severe decrease                  G4                    15-29                Severe decrease                  G5                    14 or less           Kidney failure                                    (1)In the absence of evidence of kidney disease, neither GFR category G1 or G2 fulfill the criteria for CKD.                                    eGFR calculation 2021 CKD-EPI creatinine equation, which does not include race as a factor  CBC WITH AUTO DIFFERENTIAL - Abnormal; Notable for the following components:     RBC                           3.39 (*)               Hemoglobin                    10.6 (*)               Hematocrit                    33.9 (*)               MCV                           100.0 (*)               MCHC                          31.3 (*)               RDW                           15.6 (*)               RDW-SD                        57.7 (*)               Platelets                     111 (*)                Lymphocyte %                  13.6 (*)               Eosinophil %                  0.2 (*)             All other components within normal limits  HIGH SENSITIVITIY TROPONIN T 1HR - Abnormal; Notable for the following components:     HS Troponin T                 26 (*)              All other components within normal limits         Narrative: High Sensitive Troponin T Reference Range:                  <14.0 ng/L- Negative Female for AMI                  <22.0 ng/L- Negative Male for AMI                  >=14 - Abnormal Female indicating possible myocardial injury.                  >=22 - Abnormal Male indicating possible myocardial injury.                   Clinicians would have to utilize clinical acumen, EKG, Troponin, and serial changes to determine if it is an Acute Myocardial Infarction or myocardial injury due to an underlying chronic condition.                                        CBC AND DIFFERENTIAL    XR Wrist 3+ View Left  Result Date: 7/26/2025   Intra-articular mildly displaced slightly impacted and possibly slightly comminuted radial styloid fracture.  This report was finalized on 7/26/2025 4:39 PM by Dr. Max Moralez M.D on Workstation: [id number]      XR Chest 1 View  Result Date: 7/26/2025   Redemonstrated moderate to marked cardiomegaly.  New small to moderate left pleural effusion.  No acute pulmonary infiltrate, no pneumothorax.  Multiple old healed right rib fracture deformities, osteopenia and degenerative changes, no convincing evidence of acute bony abnormality.  This report was finalized on 7/26/2025 4:37 PM by Dr. Max Moralez M.D on Workstation: [id number]      XR Pelvis 1 or 2 View  Result Date: 7/26/2025   No evidence of acute bony abnormality.  This report was finalized on 7/26/2025 4:35 PM by Dr. Max Moralez M.D on Workstation: [id number]      CT Head Without Contrast  Result Date: 7/26/2025   Head CT shows scalp soft tissue swelling, but no evidence of acute intracranial injury.  Negative cervical spine CT, degenerative change without acute abnormality.  Negative maxillofacial CT, no evidence of acute fracture, no evidence of acute orbital or ocular injury.     This report was finalized on 7/26/2025 4:34 PM by Dr. Max Moralez M.D on Workstation: [id number]      CT Cervical Spine Without Contrast  Result Date: 7/26/2025   Head CT shows scalp soft tissue swelling, but no evidence of acute intracranial injury.  Negative cervical spine CT, degenerative change without acute abnormality.  Negative maxillofacial CT, no evidence of acute fracture, no evidence of acute orbital or ocular injury.     This report was finalized on 7/26/2025 4:34 PM by Dr. Max Moralez M.D on Workstation: [id number]      CT Facial Bones Without Contrast  Result Date: 7/26/2025   Head CT shows scalp soft tissue swelling, but no evidence of acute  intracranial injury.  Negative cervical spine CT, degenerative change without acute abnormality.  Negative maxillofacial CT, no evidence of acute fracture, no evidence of acute orbital or ocular injury.     This report was finalized on 7/26/2025 4:34 PM by Dr. Max Moralez M.D on Workstation: [id number]          Problems Addressed:  Closed displaced fracture of styloid process of left radius, initial encounter: complicated acute illness or injury  Closed fracture of distal end of left radius, unspecified fracture morphology, initial encounter: complicated acute illness or injury  Injury of head, initial encounter: complicated acute illness or injury    Amount and/or Complexity of Data Reviewed  Labs: ordered.  Radiology: ordered.  ECG/medicine tests: ordered.    Risk  Prescription drug management.        Final diagnoses:   Injury of head, initial encounter   Closed displaced fracture of styloid process of left radius, initial encounter   Closed fracture of distal end of left radius, unspecified fracture morphology, initial encounter       ED Disposition  ED Disposition       ED Disposition   Discharge    Condition   Stable    Comment   --               Jey Penaloza MD  [address]  [place] [facility] [address]  [phone]      ALTERNATE HAND SPECIALIST    [facility]  [address]  [facility] [id number] [phone]    ALTERNATE HAND SPECIALIST    Angelica Anthony MD  [address]  [place] [facility] [address]  [phone]    Schedule an appointment as soon as possible for a visit       Karina Ibrahim MD  [address] [facility] [id number] [phone]               Medication List      No changes were made to your prescriptions during this visit.

## 2025-08-07 ENCOUNTER — READMISSION MANAGEMENT (OUTPATIENT)
Dept: CALL CENTER | Facility: HOSPITAL | Age: OVER 89
End: 2025-08-07
Payer: MEDICARE

## 2025-08-07 ENCOUNTER — APPOINTMENT (OUTPATIENT)
Dept: GENERAL RADIOLOGY | Facility: HOSPITAL | Age: OVER 89
End: 2025-08-07
Payer: MEDICARE

## 2025-08-07 ENCOUNTER — HOSPITAL ENCOUNTER (OUTPATIENT)
Facility: HOSPITAL | Age: OVER 89
Setting detail: OBSERVATION
Discharge: HOME | End: 2025-08-09
Attending: EMERGENCY MEDICINE | Admitting: INTERNAL MEDICINE
Payer: MEDICARE

## 2025-08-07 DIAGNOSIS — J96.21 ACUTE ON CHRONIC HYPOXIC RESPIRATORY FAILURE: Primary | ICD-10-CM

## 2025-08-07 DIAGNOSIS — I50.9 ACUTE ON CHRONIC CONGESTIVE HEART FAILURE, UNSPECIFIED HEART FAILURE TYPE: ICD-10-CM

## 2025-08-07 PROBLEM — N18.32 STAGE 3B CHRONIC KIDNEY DISEASE: Status: ACTIVE | Noted: 2025-06-09

## 2025-08-07 PROBLEM — Z66 DNR (DO NOT RESUSCITATE): Status: ACTIVE | Noted: 2025-08-07

## 2025-08-07 LAB
ALBUMIN SERPL-MCNC: 4.3 G/DL (ref 3.5–5.2)
ALBUMIN/GLOB SERPL: 1.7 G/DL
ALP SERPL-CCNC: 127 U/L (ref 39–117)
ALT SERPL W P-5'-P-CCNC: 30 U/L (ref 1–33)
ANION GAP SERPL CALCULATED.3IONS-SCNC: 9.4 MMOL/L (ref 5–15)
ARTERIAL PATENCY WRIST A: POSITIVE
AST SERPL-CCNC: 35 U/L (ref 1–32)
ATMOSPHERIC PRESS: 753.6 MMHG
BASE EXCESS BLDA CALC-SCNC: 4 MMOL/L (ref 0–2)
BASOPHILS # BLD AUTO: 0 10*3/MM3 (ref 0–0.2)
BASOPHILS NFR BLD AUTO: 0 % (ref 0–1.5)
BDY SITE: ABNORMAL
BILIRUB SERPL-MCNC: 0.6 MG/DL (ref 0–1.2)
BUN SERPL-MCNC: 30 MG/DL (ref 8–23)
BUN/CREAT SERPL: 24.4 (ref 7–25)
CALCIUM SPEC-SCNC: 9.3 MG/DL (ref 8.2–9.6)
CARBAMAZEPINE SERPL-MCNC: 3.4 MCG/ML (ref 4–12)
CHLORIDE SERPL-SCNC: 102 MMOL/L (ref 98–107)
CO2 SERPL-SCNC: 25.6 MMOL/L (ref 22–29)
CREAT SERPL-MCNC: 1.23 MG/DL (ref 0.57–1)
DEPRECATED RDW RBC AUTO: 50.8 FL (ref 37–54)
DEVICE COMMENT: ABNORMAL
EGFRCR SERPLBLD CKD-EPI 2021: 41.1 ML/MIN/1.73
EOSINOPHIL # BLD AUTO: 0.01 10*3/MM3 (ref 0–0.4)
EOSINOPHIL NFR BLD AUTO: 0.3 % (ref 0.3–6.2)
ERYTHROCYTE [DISTWIDTH] IN BLOOD BY AUTOMATED COUNT: 14.2 % (ref 12.3–15.4)
GAS FLOW AIRWAY: 6 LPM
GEN 5 1HR TROPONIN T REFLEX: 32 NG/L
GLOBULIN UR ELPH-MCNC: 2.5 GM/DL
GLUCOSE SERPL-MCNC: 158 MG/DL (ref 65–99)
HCO3 BLDA-SCNC: 29.8 MMOL/L (ref 22–28)
HCT VFR BLD AUTO: 32.4 % (ref 34–46.6)
HEMODILUTION: NO
HGB BLD-MCNC: 10.6 G/DL (ref 12–15.9)
IMM GRANULOCYTES # BLD AUTO: 0.02 10*3/MM3 (ref 0–0.05)
IMM GRANULOCYTES NFR BLD AUTO: 0.5 % (ref 0–0.5)
LYMPHOCYTES # BLD AUTO: 0.36 10*3/MM3 (ref 0.7–3.1)
LYMPHOCYTES NFR BLD AUTO: 9.1 % (ref 19.6–45.3)
MCH RBC QN AUTO: 32 PG (ref 26.6–33)
MCHC RBC AUTO-ENTMCNC: 32.7 G/DL (ref 31.5–35.7)
MCV RBC AUTO: 97.9 FL (ref 79–97)
MODALITY: ABNORMAL
MONOCYTES # BLD AUTO: 0.34 10*3/MM3 (ref 0.1–0.9)
MONOCYTES NFR BLD AUTO: 8.6 % (ref 5–12)
NEUTROPHILS NFR BLD AUTO: 3.23 10*3/MM3 (ref 1.7–7)
NEUTROPHILS NFR BLD AUTO: 81.5 % (ref 42.7–76)
NRBC BLD AUTO-RTO: 0 /100 WBC (ref 0–0.2)
NT-PROBNP SERPL-MCNC: 4792 PG/ML (ref 0–1800)
PCO2 BLDA: 49 MM HG (ref 35–45)
PH BLDA: 7.39 PH UNITS (ref 7.35–7.45)
PLATELET # BLD AUTO: 124 10*3/MM3 (ref 140–450)
PMV BLD AUTO: 9.9 FL (ref 6–12)
PO2 BLDA: 99 MM HG (ref 80–100)
POTASSIUM SERPL-SCNC: 5.3 MMOL/L (ref 3.5–5.2)
PROT SERPL-MCNC: 6.8 G/DL (ref 6–8.5)
QT INTERVAL: 358 MS
QTC INTERVAL: 452 MS
RBC # BLD AUTO: 3.31 10*6/MM3 (ref 3.77–5.28)
SAO2 % BLDCOA: 97.5 % (ref 92–98.5)
SODIUM SERPL-SCNC: 137 MMOL/L (ref 136–145)
TOTAL RATE: 18 BREATHS/MINUTE
TROPONIN T % DELTA: -6
TROPONIN T NUMERIC DELTA: -2 NG/L
TROPONIN T SERPL HS-MCNC: 34 NG/L
WBC NRBC COR # BLD AUTO: 3.96 10*3/MM3 (ref 3.4–10.8)

## 2025-08-07 PROCEDURE — 80053 COMPREHEN METABOLIC PANEL: CPT | Performed by: EMERGENCY MEDICINE

## 2025-08-07 PROCEDURE — 99285 EMERGENCY DEPT VISIT HI MDM: CPT

## 2025-08-07 PROCEDURE — G0378 HOSPITAL OBSERVATION PER HR: HCPCS

## 2025-08-07 PROCEDURE — 25010000002 ENOXAPARIN PER 10 MG: Performed by: INTERNAL MEDICINE

## 2025-08-07 PROCEDURE — 93005 ELECTROCARDIOGRAM TRACING: CPT | Performed by: EMERGENCY MEDICINE

## 2025-08-07 PROCEDURE — 96376 TX/PRO/DX INJ SAME DRUG ADON: CPT

## 2025-08-07 PROCEDURE — 25010000002 FUROSEMIDE PER 20 MG: Performed by: EMERGENCY MEDICINE

## 2025-08-07 PROCEDURE — 36600 WITHDRAWAL OF ARTERIAL BLOOD: CPT | Performed by: EMERGENCY MEDICINE

## 2025-08-07 PROCEDURE — 82803 BLOOD GASES ANY COMBINATION: CPT | Performed by: EMERGENCY MEDICINE

## 2025-08-07 PROCEDURE — 71045 X-RAY EXAM CHEST 1 VIEW: CPT

## 2025-08-07 PROCEDURE — 25010000002 FUROSEMIDE PER 20 MG: Performed by: INTERNAL MEDICINE

## 2025-08-07 PROCEDURE — 84484 ASSAY OF TROPONIN QUANT: CPT | Performed by: EMERGENCY MEDICINE

## 2025-08-07 PROCEDURE — 96372 THER/PROPH/DIAG INJ SC/IM: CPT

## 2025-08-07 PROCEDURE — 83880 ASSAY OF NATRIURETIC PEPTIDE: CPT | Performed by: EMERGENCY MEDICINE

## 2025-08-07 PROCEDURE — 85025 COMPLETE CBC W/AUTO DIFF WBC: CPT | Performed by: EMERGENCY MEDICINE

## 2025-08-07 PROCEDURE — 93010 ELECTROCARDIOGRAM REPORT: CPT | Performed by: INTERNAL MEDICINE

## 2025-08-07 PROCEDURE — 80156 ASSAY CARBAMAZEPINE TOTAL: CPT | Performed by: EMERGENCY MEDICINE

## 2025-08-07 PROCEDURE — 96374 THER/PROPH/DIAG INJ IV PUSH: CPT

## 2025-08-07 PROCEDURE — 36415 COLL VENOUS BLD VENIPUNCTURE: CPT

## 2025-08-07 RX ORDER — TRAZODONE HYDROCHLORIDE 50 MG/1
50 TABLET ORAL NIGHTLY
Status: DISCONTINUED | OUTPATIENT
Start: 2025-08-07 | End: 2025-08-09 | Stop reason: HOSPADM

## 2025-08-07 RX ORDER — SODIUM CHLORIDE 9 MG/ML
40 INJECTION, SOLUTION INTRAVENOUS AS NEEDED
Status: DISCONTINUED | OUTPATIENT
Start: 2025-08-07 | End: 2025-08-09 | Stop reason: HOSPADM

## 2025-08-07 RX ORDER — SODIUM CHLORIDE 0.9 % (FLUSH) 0.9 %
10 SYRINGE (ML) INJECTION EVERY 12 HOURS SCHEDULED
Status: DISCONTINUED | OUTPATIENT
Start: 2025-08-07 | End: 2025-08-09 | Stop reason: HOSPADM

## 2025-08-07 RX ORDER — FUROSEMIDE 10 MG/ML
80 INJECTION INTRAMUSCULAR; INTRAVENOUS ONCE
Status: COMPLETED | OUTPATIENT
Start: 2025-08-07 | End: 2025-08-07

## 2025-08-07 RX ORDER — ACETAMINOPHEN 650 MG/1
650 SUPPOSITORY RECTAL EVERY 4 HOURS PRN
Status: DISCONTINUED | OUTPATIENT
Start: 2025-08-07 | End: 2025-08-09 | Stop reason: HOSPADM

## 2025-08-07 RX ORDER — ONDANSETRON 2 MG/ML
4 INJECTION INTRAMUSCULAR; INTRAVENOUS EVERY 6 HOURS PRN
Status: DISCONTINUED | OUTPATIENT
Start: 2025-08-07 | End: 2025-08-09 | Stop reason: HOSPADM

## 2025-08-07 RX ORDER — BISACODYL 10 MG
10 SUPPOSITORY, RECTAL RECTAL DAILY PRN
Status: DISCONTINUED | OUTPATIENT
Start: 2025-08-07 | End: 2025-08-09 | Stop reason: HOSPADM

## 2025-08-07 RX ORDER — TERAZOSIN 5 MG/1
5 CAPSULE ORAL NIGHTLY
Status: DISCONTINUED | OUTPATIENT
Start: 2025-08-07 | End: 2025-08-09 | Stop reason: HOSPADM

## 2025-08-07 RX ORDER — FUROSEMIDE 10 MG/ML
40 INJECTION INTRAMUSCULAR; INTRAVENOUS EVERY 12 HOURS
Status: DISCONTINUED | OUTPATIENT
Start: 2025-08-07 | End: 2025-08-09

## 2025-08-07 RX ORDER — BRIMONIDINE TARTRATE 2 MG/ML
1 SOLUTION/ DROPS OPHTHALMIC 2 TIMES DAILY
Status: DISCONTINUED | OUTPATIENT
Start: 2025-08-07 | End: 2025-08-09 | Stop reason: HOSPADM

## 2025-08-07 RX ORDER — DORZOLAMIDE HCL 20 MG/ML
1 SOLUTION/ DROPS OPHTHALMIC 3 TIMES DAILY
Status: DISCONTINUED | OUTPATIENT
Start: 2025-08-07 | End: 2025-08-09 | Stop reason: HOSPADM

## 2025-08-07 RX ORDER — CALCIUM CARBONATE 500 MG/1
2 TABLET, CHEWABLE ORAL 3 TIMES DAILY PRN
Status: DISCONTINUED | OUTPATIENT
Start: 2025-08-07 | End: 2025-08-09 | Stop reason: HOSPADM

## 2025-08-07 RX ORDER — ONDANSETRON 4 MG/1
4 TABLET, ORALLY DISINTEGRATING ORAL EVERY 6 HOURS PRN
Status: DISCONTINUED | OUTPATIENT
Start: 2025-08-07 | End: 2025-08-09 | Stop reason: HOSPADM

## 2025-08-07 RX ORDER — ENOXAPARIN SODIUM 100 MG/ML
30 INJECTION SUBCUTANEOUS DAILY
Status: DISCONTINUED | OUTPATIENT
Start: 2025-08-07 | End: 2025-08-09 | Stop reason: HOSPADM

## 2025-08-07 RX ORDER — SPIRONOLACTONE 25 MG/1
25 TABLET ORAL DAILY
Status: DISCONTINUED | OUTPATIENT
Start: 2025-08-07 | End: 2025-08-09 | Stop reason: HOSPADM

## 2025-08-07 RX ORDER — ACETAMINOPHEN 325 MG/1
650 TABLET ORAL EVERY 4 HOURS PRN
Status: DISCONTINUED | OUTPATIENT
Start: 2025-08-07 | End: 2025-08-09 | Stop reason: HOSPADM

## 2025-08-07 RX ORDER — ATENOLOL 25 MG/1
25 TABLET ORAL
Status: DISCONTINUED | OUTPATIENT
Start: 2025-08-07 | End: 2025-08-09 | Stop reason: HOSPADM

## 2025-08-07 RX ORDER — LEVOTHYROXINE SODIUM 75 UG/1
75 TABLET ORAL
Status: DISCONTINUED | OUTPATIENT
Start: 2025-08-08 | End: 2025-08-09 | Stop reason: HOSPADM

## 2025-08-07 RX ORDER — CARBAMAZEPINE 100 MG/1
100 TABLET, EXTENDED RELEASE ORAL 2 TIMES DAILY
Status: DISCONTINUED | OUTPATIENT
Start: 2025-08-07 | End: 2025-08-09 | Stop reason: HOSPADM

## 2025-08-07 RX ORDER — LOSARTAN POTASSIUM 100 MG/1
100 TABLET ORAL DAILY
COMMUNITY
End: 2025-08-09 | Stop reason: HOSPADM

## 2025-08-07 RX ORDER — ACETAMINOPHEN 160 MG/5ML
650 SOLUTION ORAL EVERY 4 HOURS PRN
Status: DISCONTINUED | OUTPATIENT
Start: 2025-08-07 | End: 2025-08-09 | Stop reason: HOSPADM

## 2025-08-07 RX ORDER — POLYETHYLENE GLYCOL 3350 17 G/17G
17 POWDER, FOR SOLUTION ORAL DAILY PRN
Status: DISCONTINUED | OUTPATIENT
Start: 2025-08-07 | End: 2025-08-09 | Stop reason: HOSPADM

## 2025-08-07 RX ORDER — ASPIRIN 81 MG/1
81 TABLET ORAL DAILY
Status: DISCONTINUED | OUTPATIENT
Start: 2025-08-07 | End: 2025-08-09 | Stop reason: HOSPADM

## 2025-08-07 RX ORDER — BISACODYL 5 MG/1
5 TABLET, DELAYED RELEASE ORAL DAILY PRN
Status: DISCONTINUED | OUTPATIENT
Start: 2025-08-07 | End: 2025-08-09 | Stop reason: HOSPADM

## 2025-08-07 RX ORDER — AMOXICILLIN 250 MG
2 CAPSULE ORAL 2 TIMES DAILY PRN
Status: DISCONTINUED | OUTPATIENT
Start: 2025-08-07 | End: 2025-08-09 | Stop reason: HOSPADM

## 2025-08-07 RX ORDER — LOSARTAN POTASSIUM 100 MG/1
100 TABLET ORAL DAILY
Status: DISCONTINUED | OUTPATIENT
Start: 2025-08-07 | End: 2025-08-09 | Stop reason: HOSPADM

## 2025-08-07 RX ORDER — SODIUM CHLORIDE 0.9 % (FLUSH) 0.9 %
10 SYRINGE (ML) INJECTION AS NEEDED
Status: DISCONTINUED | OUTPATIENT
Start: 2025-08-07 | End: 2025-08-09 | Stop reason: HOSPADM

## 2025-08-07 RX ORDER — NITROGLYCERIN 0.4 MG/1
0.4 TABLET SUBLINGUAL
Status: DISCONTINUED | OUTPATIENT
Start: 2025-08-07 | End: 2025-08-09 | Stop reason: HOSPADM

## 2025-08-07 RX ORDER — AMLODIPINE BESYLATE 5 MG/1
5 TABLET ORAL DAILY
Status: DISCONTINUED | OUTPATIENT
Start: 2025-08-07 | End: 2025-08-09 | Stop reason: HOSPADM

## 2025-08-07 RX ADMIN — Medication 10 ML: at 20:18

## 2025-08-07 RX ADMIN — DORZOLAMIDE HYDROCHLORIDE 1 DROP: 20 SOLUTION/ DROPS OPHTHALMIC at 20:14

## 2025-08-07 RX ADMIN — ENOXAPARIN SODIUM 30 MG: 100 INJECTION SUBCUTANEOUS at 18:18

## 2025-08-07 RX ADMIN — TERAZOSIN HYDROCHLORIDE 5 MG: 5 CAPSULE ORAL at 20:13

## 2025-08-07 RX ADMIN — BRIMONIDINE TARTRATE 1 DROP: 2 SOLUTION/ DROPS OPHTHALMIC at 20:14

## 2025-08-07 RX ADMIN — FUROSEMIDE 40 MG: 10 INJECTION, SOLUTION INTRAMUSCULAR; INTRAVENOUS at 18:19

## 2025-08-07 RX ADMIN — Medication 10 ML: at 20:16

## 2025-08-07 RX ADMIN — SPIRONOLACTONE 25 MG: 25 TABLET ORAL at 18:17

## 2025-08-07 RX ADMIN — TRAZODONE HYDROCHLORIDE 50 MG: 50 TABLET ORAL at 20:13

## 2025-08-07 RX ADMIN — ACETAMINOPHEN 650 MG: 325 TABLET ORAL at 20:34

## 2025-08-07 RX ADMIN — CARBAMAZEPINE 100 MG: 100 TABLET, EXTENDED RELEASE ORAL at 20:13

## 2025-08-07 RX ADMIN — FUROSEMIDE 80 MG: 10 INJECTION, SOLUTION INTRAMUSCULAR; INTRAVENOUS at 14:29

## 2025-08-07 RX ADMIN — ASPIRIN 81 MG: 81 TABLET, COATED ORAL at 18:17

## 2025-08-08 LAB
ANION GAP SERPL CALCULATED.3IONS-SCNC: 12 MMOL/L (ref 5–15)
BASOPHILS # BLD AUTO: 0.01 10*3/MM3 (ref 0–0.2)
BASOPHILS NFR BLD AUTO: 0.2 % (ref 0–1.5)
BUN SERPL-MCNC: 33 MG/DL (ref 8–23)
BUN/CREAT SERPL: 26.2 (ref 7–25)
CALCIUM SPEC-SCNC: 9.2 MG/DL (ref 8.2–9.6)
CHLORIDE SERPL-SCNC: 99 MMOL/L (ref 98–107)
CO2 SERPL-SCNC: 26 MMOL/L (ref 22–29)
CREAT SERPL-MCNC: 1.26 MG/DL (ref 0.57–1)
DEPRECATED RDW RBC AUTO: 49.2 FL (ref 37–54)
EGFRCR SERPLBLD CKD-EPI 2021: 39.9 ML/MIN/1.73
EOSINOPHIL # BLD AUTO: 0.05 10*3/MM3 (ref 0–0.4)
EOSINOPHIL NFR BLD AUTO: 1.1 % (ref 0.3–6.2)
ERYTHROCYTE [DISTWIDTH] IN BLOOD BY AUTOMATED COUNT: 14.1 % (ref 12.3–15.4)
GLUCOSE SERPL-MCNC: 93 MG/DL (ref 65–99)
HCT VFR BLD AUTO: 31.5 % (ref 34–46.6)
HGB BLD-MCNC: 10 G/DL (ref 12–15.9)
IMM GRANULOCYTES # BLD AUTO: 0.01 10*3/MM3 (ref 0–0.05)
IMM GRANULOCYTES NFR BLD AUTO: 0.2 % (ref 0–0.5)
LYMPHOCYTES # BLD AUTO: 0.98 10*3/MM3 (ref 0.7–3.1)
LYMPHOCYTES NFR BLD AUTO: 22.4 % (ref 19.6–45.3)
MCH RBC QN AUTO: 30.9 PG (ref 26.6–33)
MCHC RBC AUTO-ENTMCNC: 31.7 G/DL (ref 31.5–35.7)
MCV RBC AUTO: 97.2 FL (ref 79–97)
MONOCYTES # BLD AUTO: 0.4 10*3/MM3 (ref 0.1–0.9)
MONOCYTES NFR BLD AUTO: 9.2 % (ref 5–12)
NEUTROPHILS NFR BLD AUTO: 2.92 10*3/MM3 (ref 1.7–7)
NEUTROPHILS NFR BLD AUTO: 66.9 % (ref 42.7–76)
NRBC BLD AUTO-RTO: 0 /100 WBC (ref 0–0.2)
PLATELET # BLD AUTO: 129 10*3/MM3 (ref 140–450)
PMV BLD AUTO: 9.8 FL (ref 6–12)
POTASSIUM SERPL-SCNC: 4.4 MMOL/L (ref 3.5–5.2)
RBC # BLD AUTO: 3.24 10*6/MM3 (ref 3.77–5.28)
SODIUM SERPL-SCNC: 137 MMOL/L (ref 136–145)
WBC NRBC COR # BLD AUTO: 4.37 10*3/MM3 (ref 3.4–10.8)

## 2025-08-08 PROCEDURE — 25010000002 ENOXAPARIN PER 10 MG: Performed by: INTERNAL MEDICINE

## 2025-08-08 PROCEDURE — 85025 COMPLETE CBC W/AUTO DIFF WBC: CPT | Performed by: INTERNAL MEDICINE

## 2025-08-08 PROCEDURE — G0378 HOSPITAL OBSERVATION PER HR: HCPCS

## 2025-08-08 PROCEDURE — 96372 THER/PROPH/DIAG INJ SC/IM: CPT

## 2025-08-08 PROCEDURE — 97166 OT EVAL MOD COMPLEX 45 MIN: CPT

## 2025-08-08 PROCEDURE — 96376 TX/PRO/DX INJ SAME DRUG ADON: CPT

## 2025-08-08 PROCEDURE — 80048 BASIC METABOLIC PNL TOTAL CA: CPT | Performed by: INTERNAL MEDICINE

## 2025-08-08 PROCEDURE — 97530 THERAPEUTIC ACTIVITIES: CPT

## 2025-08-08 PROCEDURE — 25010000002 FUROSEMIDE PER 20 MG: Performed by: INTERNAL MEDICINE

## 2025-08-08 PROCEDURE — 97162 PT EVAL MOD COMPLEX 30 MIN: CPT

## 2025-08-08 PROCEDURE — 97535 SELF CARE MNGMENT TRAINING: CPT

## 2025-08-08 RX ADMIN — BRIMONIDINE TARTRATE 1 DROP: 2 SOLUTION/ DROPS OPHTHALMIC at 20:17

## 2025-08-08 RX ADMIN — CARBAMAZEPINE 100 MG: 100 TABLET, EXTENDED RELEASE ORAL at 20:17

## 2025-08-08 RX ADMIN — AMLODIPINE BESYLATE 5 MG: 5 TABLET ORAL at 08:34

## 2025-08-08 RX ADMIN — LEVOTHYROXINE SODIUM 75 MCG: 0.07 TABLET ORAL at 05:49

## 2025-08-08 RX ADMIN — ATENOLOL 25 MG: 25 TABLET ORAL at 08:34

## 2025-08-08 RX ADMIN — TERAZOSIN HYDROCHLORIDE 5 MG: 5 CAPSULE ORAL at 20:17

## 2025-08-08 RX ADMIN — DORZOLAMIDE HYDROCHLORIDE 1 DROP: 20 SOLUTION/ DROPS OPHTHALMIC at 17:09

## 2025-08-08 RX ADMIN — FUROSEMIDE 40 MG: 10 INJECTION, SOLUTION INTRAMUSCULAR; INTRAVENOUS at 17:09

## 2025-08-08 RX ADMIN — TRAZODONE HYDROCHLORIDE 50 MG: 50 TABLET ORAL at 20:17

## 2025-08-08 RX ADMIN — ASPIRIN 81 MG: 81 TABLET, COATED ORAL at 20:17

## 2025-08-08 RX ADMIN — BRIMONIDINE TARTRATE 1 DROP: 2 SOLUTION/ DROPS OPHTHALMIC at 08:34

## 2025-08-08 RX ADMIN — Medication 10 ML: at 20:21

## 2025-08-08 RX ADMIN — Medication 10 ML: at 08:36

## 2025-08-08 RX ADMIN — CARBAMAZEPINE 100 MG: 100 TABLET, EXTENDED RELEASE ORAL at 08:34

## 2025-08-08 RX ADMIN — ENOXAPARIN SODIUM 30 MG: 100 INJECTION SUBCUTANEOUS at 08:34

## 2025-08-08 RX ADMIN — DORZOLAMIDE HYDROCHLORIDE 1 DROP: 20 SOLUTION/ DROPS OPHTHALMIC at 20:17

## 2025-08-08 RX ADMIN — DORZOLAMIDE HYDROCHLORIDE 1 DROP: 20 SOLUTION/ DROPS OPHTHALMIC at 08:34

## 2025-08-08 RX ADMIN — SPIRONOLACTONE 25 MG: 25 TABLET ORAL at 08:34

## 2025-08-08 RX ADMIN — FUROSEMIDE 40 MG: 10 INJECTION, SOLUTION INTRAMUSCULAR; INTRAVENOUS at 05:49

## 2025-08-09 VITALS
WEIGHT: 100.53 LBS | DIASTOLIC BLOOD PRESSURE: 81 MMHG | HEART RATE: 87 BPM | OXYGEN SATURATION: 94 % | RESPIRATION RATE: 17 BRPM | BODY MASS INDEX: 19.74 KG/M2 | SYSTOLIC BLOOD PRESSURE: 151 MMHG | HEIGHT: 60 IN | TEMPERATURE: 97.9 F

## 2025-08-09 LAB
ANION GAP SERPL CALCULATED.3IONS-SCNC: 11.4 MMOL/L (ref 5–15)
BASOPHILS # BLD AUTO: 0.01 10*3/MM3 (ref 0–0.2)
BASOPHILS NFR BLD AUTO: 0.3 % (ref 0–1.5)
BUN SERPL-MCNC: 38 MG/DL (ref 8–23)
BUN/CREAT SERPL: 23.9 (ref 7–25)
CALCIUM SPEC-SCNC: 8.6 MG/DL (ref 8.2–9.6)
CHLORIDE SERPL-SCNC: 99 MMOL/L (ref 98–107)
CO2 SERPL-SCNC: 26.6 MMOL/L (ref 22–29)
CREAT SERPL-MCNC: 1.59 MG/DL (ref 0.57–1)
DEPRECATED RDW RBC AUTO: 49.8 FL (ref 37–54)
EGFRCR SERPLBLD CKD-EPI 2021: 30.2 ML/MIN/1.73
EOSINOPHIL # BLD AUTO: 0.05 10*3/MM3 (ref 0–0.4)
EOSINOPHIL NFR BLD AUTO: 1.4 % (ref 0.3–6.2)
ERYTHROCYTE [DISTWIDTH] IN BLOOD BY AUTOMATED COUNT: 14 % (ref 12.3–15.4)
GLUCOSE SERPL-MCNC: 69 MG/DL (ref 65–99)
HCT VFR BLD AUTO: 29.6 % (ref 34–46.6)
HGB BLD-MCNC: 9.4 G/DL (ref 12–15.9)
IMM GRANULOCYTES # BLD AUTO: 0.02 10*3/MM3 (ref 0–0.05)
IMM GRANULOCYTES NFR BLD AUTO: 0.5 % (ref 0–0.5)
LYMPHOCYTES # BLD AUTO: 0.81 10*3/MM3 (ref 0.7–3.1)
LYMPHOCYTES NFR BLD AUTO: 22.1 % (ref 19.6–45.3)
MCH RBC QN AUTO: 30.9 PG (ref 26.6–33)
MCHC RBC AUTO-ENTMCNC: 31.8 G/DL (ref 31.5–35.7)
MCV RBC AUTO: 97.4 FL (ref 79–97)
MONOCYTES # BLD AUTO: 0.4 10*3/MM3 (ref 0.1–0.9)
MONOCYTES NFR BLD AUTO: 10.9 % (ref 5–12)
NEUTROPHILS NFR BLD AUTO: 2.38 10*3/MM3 (ref 1.7–7)
NEUTROPHILS NFR BLD AUTO: 64.8 % (ref 42.7–76)
NRBC BLD AUTO-RTO: 0 /100 WBC (ref 0–0.2)
PLATELET # BLD AUTO: 129 10*3/MM3 (ref 140–450)
PMV BLD AUTO: 10.3 FL (ref 6–12)
POTASSIUM SERPL-SCNC: 4.3 MMOL/L (ref 3.5–5.2)
RBC # BLD AUTO: 3.04 10*6/MM3 (ref 3.77–5.28)
SODIUM SERPL-SCNC: 137 MMOL/L (ref 136–145)
WBC NRBC COR # BLD AUTO: 3.67 10*3/MM3 (ref 3.4–10.8)

## 2025-08-09 PROCEDURE — 85025 COMPLETE CBC W/AUTO DIFF WBC: CPT | Performed by: INTERNAL MEDICINE

## 2025-08-09 PROCEDURE — G0378 HOSPITAL OBSERVATION PER HR: HCPCS

## 2025-08-09 PROCEDURE — 25010000002 ENOXAPARIN PER 10 MG: Performed by: INTERNAL MEDICINE

## 2025-08-09 PROCEDURE — 80048 BASIC METABOLIC PNL TOTAL CA: CPT | Performed by: INTERNAL MEDICINE

## 2025-08-09 PROCEDURE — 96376 TX/PRO/DX INJ SAME DRUG ADON: CPT

## 2025-08-09 PROCEDURE — 25010000002 FUROSEMIDE PER 20 MG: Performed by: INTERNAL MEDICINE

## 2025-08-09 PROCEDURE — 96372 THER/PROPH/DIAG INJ SC/IM: CPT

## 2025-08-09 RX ORDER — FUROSEMIDE 20 MG/1
20 TABLET ORAL DAILY
Start: 2025-08-10

## 2025-08-09 RX ADMIN — BRIMONIDINE TARTRATE 1 DROP: 2 SOLUTION/ DROPS OPHTHALMIC at 09:14

## 2025-08-09 RX ADMIN — AMLODIPINE BESYLATE 5 MG: 5 TABLET ORAL at 09:12

## 2025-08-09 RX ADMIN — ATENOLOL 25 MG: 25 TABLET ORAL at 09:12

## 2025-08-09 RX ADMIN — SPIRONOLACTONE 25 MG: 25 TABLET ORAL at 09:12

## 2025-08-09 RX ADMIN — DORZOLAMIDE HYDROCHLORIDE 1 DROP: 20 SOLUTION/ DROPS OPHTHALMIC at 09:14

## 2025-08-09 RX ADMIN — FUROSEMIDE 40 MG: 10 INJECTION, SOLUTION INTRAMUSCULAR; INTRAVENOUS at 05:45

## 2025-08-09 RX ADMIN — Medication 10 ML: at 09:14

## 2025-08-09 RX ADMIN — ENOXAPARIN SODIUM 30 MG: 100 INJECTION SUBCUTANEOUS at 09:12

## 2025-08-09 RX ADMIN — CARBAMAZEPINE 100 MG: 100 TABLET, EXTENDED RELEASE ORAL at 09:12

## 2025-08-09 RX ADMIN — LEVOTHYROXINE SODIUM 75 MCG: 0.07 TABLET ORAL at 05:45

## 2025-08-09 RX ADMIN — ASPIRIN 81 MG: 81 TABLET, COATED ORAL at 09:12

## 2025-08-11 ENCOUNTER — READMISSION MANAGEMENT (OUTPATIENT)
Dept: CALL CENTER | Facility: HOSPITAL | Age: OVER 89
End: 2025-08-11
Payer: MEDICARE

## 2025-08-13 ENCOUNTER — READMISSION MANAGEMENT (OUTPATIENT)
Dept: CALL CENTER | Facility: HOSPITAL | Age: OVER 89
End: 2025-08-13
Payer: MEDICARE

## 2025-08-14 ENCOUNTER — READMISSION MANAGEMENT (OUTPATIENT)
Dept: CALL CENTER | Facility: HOSPITAL | Age: OVER 89
End: 2025-08-14
Payer: MEDICARE

## 2025-08-18 ENCOUNTER — LAB (OUTPATIENT)
Dept: LAB | Facility: HOSPITAL | Age: OVER 89
End: 2025-08-18
Payer: MEDICARE

## 2025-08-18 ENCOUNTER — TRANSCRIBE ORDERS (OUTPATIENT)
Dept: ADMINISTRATIVE | Facility: HOSPITAL | Age: OVER 89
End: 2025-08-18
Payer: MEDICARE

## 2025-08-18 DIAGNOSIS — I50.9 HEART FAILURE, UNSPECIFIED HF CHRONICITY, UNSPECIFIED HEART FAILURE TYPE: Primary | ICD-10-CM

## 2025-08-18 DIAGNOSIS — I50.9 HEART FAILURE, UNSPECIFIED HF CHRONICITY, UNSPECIFIED HEART FAILURE TYPE: ICD-10-CM

## 2025-08-18 LAB
ANION GAP SERPL CALCULATED.3IONS-SCNC: 11.2 MMOL/L (ref 5–15)
BUN SERPL-MCNC: 38 MG/DL (ref 8–23)
BUN/CREAT SERPL: 24.5 (ref 7–25)
CALCIUM SPEC-SCNC: 9.4 MG/DL (ref 8.2–9.6)
CHLORIDE SERPL-SCNC: 96 MMOL/L (ref 98–107)
CO2 SERPL-SCNC: 26.8 MMOL/L (ref 22–29)
CREAT SERPL-MCNC: 1.55 MG/DL (ref 0.57–1)
EGFRCR SERPLBLD CKD-EPI 2021: 31.1 ML/MIN/1.73
GLUCOSE SERPL-MCNC: 103 MG/DL (ref 65–99)
POTASSIUM SERPL-SCNC: 5.1 MMOL/L (ref 3.5–5.2)
SODIUM SERPL-SCNC: 134 MMOL/L (ref 136–145)

## 2025-08-18 PROCEDURE — 36415 COLL VENOUS BLD VENIPUNCTURE: CPT

## 2025-08-18 PROCEDURE — 80048 BASIC METABOLIC PNL TOTAL CA: CPT

## 2025-08-22 ENCOUNTER — TRANSCRIBE ORDERS (OUTPATIENT)
Dept: ADMINISTRATIVE | Facility: HOSPITAL | Age: OVER 89
End: 2025-08-22
Payer: MEDICARE

## 2025-08-22 DIAGNOSIS — I50.9 HEART FAILURE, UNSPECIFIED HF CHRONICITY, UNSPECIFIED HEART FAILURE TYPE: Primary | ICD-10-CM

## 2025-08-25 ENCOUNTER — LAB (OUTPATIENT)
Dept: LAB | Facility: HOSPITAL | Age: OVER 89
End: 2025-08-25
Payer: MEDICARE

## 2025-08-25 ENCOUNTER — TRANSCRIBE ORDERS (OUTPATIENT)
Dept: ADMINISTRATIVE | Facility: HOSPITAL | Age: OVER 89
End: 2025-08-25
Payer: MEDICARE

## 2025-08-25 DIAGNOSIS — I50.9 HEART FAILURE, UNSPECIFIED HF CHRONICITY, UNSPECIFIED HEART FAILURE TYPE: Primary | ICD-10-CM

## 2025-08-25 DIAGNOSIS — I50.9 HEART FAILURE, UNSPECIFIED HF CHRONICITY, UNSPECIFIED HEART FAILURE TYPE: ICD-10-CM

## 2025-08-25 LAB
ANION GAP SERPL CALCULATED.3IONS-SCNC: 14.6 MMOL/L (ref 5–15)
BUN SERPL-MCNC: 37 MG/DL (ref 8–23)
BUN/CREAT SERPL: 26.1 (ref 7–25)
CALCIUM SPEC-SCNC: 9.4 MG/DL (ref 8.2–9.6)
CHLORIDE SERPL-SCNC: 95 MMOL/L (ref 98–107)
CO2 SERPL-SCNC: 25.4 MMOL/L (ref 22–29)
CREAT SERPL-MCNC: 1.42 MG/DL (ref 0.57–1)
EGFRCR SERPLBLD CKD-EPI 2021: 34.6 ML/MIN/1.73
GLUCOSE SERPL-MCNC: 135 MG/DL (ref 65–99)
NT-PROBNP SERPL-MCNC: 4824 PG/ML (ref 0–1800)
POTASSIUM SERPL-SCNC: 4.7 MMOL/L (ref 3.5–5.2)
SODIUM SERPL-SCNC: 135 MMOL/L (ref 136–145)

## 2025-08-25 PROCEDURE — 80048 BASIC METABOLIC PNL TOTAL CA: CPT

## 2025-08-25 PROCEDURE — 36415 COLL VENOUS BLD VENIPUNCTURE: CPT

## 2025-08-25 PROCEDURE — 83880 ASSAY OF NATRIURETIC PEPTIDE: CPT

## 2025-08-26 ENCOUNTER — READMISSION MANAGEMENT (OUTPATIENT)
Dept: CALL CENTER | Facility: HOSPITAL | Age: OVER 89
End: 2025-08-26
Payer: MEDICARE

## (undated) DEVICE — RADIFOCUS GLIDEWIRE ADVANTAGE GUIDEWIRE: Brand: GLIDEWIRE ADVANTAGE

## (undated) DEVICE — GLIDESHEATH SLENDER STAINLESS STEEL KIT: Brand: GLIDESHEATH SLENDER

## (undated) DEVICE — BALN CATH PRESS WEDGE 6F 110CM

## (undated) DEVICE — KT MANIFLD CARDIAC

## (undated) DEVICE — DGW .035 FC J3MM 260CM TEF: Brand: EMERALD

## (undated) DEVICE — CATH DIAG IMPULSE FL3.5 5F 100CM

## (undated) DEVICE — CATH DIAG IMPULSE FR5 5F 100CM

## (undated) DEVICE — PK CATH CARD 40